# Patient Record
Sex: FEMALE | Race: BLACK OR AFRICAN AMERICAN | NOT HISPANIC OR LATINO | Employment: OTHER | URBAN - NONMETROPOLITAN AREA
[De-identification: names, ages, dates, MRNs, and addresses within clinical notes are randomized per-mention and may not be internally consistent; named-entity substitution may affect disease eponyms.]

---
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
## 2019-01-03 PROBLEM — Z97.5 NEXPLANON IN PLACE: Status: ACTIVE | Noted: 2019-01-03

## 2019-01-03 PROBLEM — R10.2 ACUTE PELVIC PAIN, FEMALE: Status: RESOLVED | Noted: 2018-09-06 | Resolved: 2019-01-03

## 2019-01-03 PROBLEM — N20.0 NEPHROLITHIASIS: Status: ACTIVE | Noted: 2019-01-03

## 2019-01-07 ENCOUNTER — TELEPHONE (OUTPATIENT)
Dept: PSYCHIATRY | Facility: CLINIC | Age: 19
End: 2019-01-07

## 2019-01-08 RX ORDER — QUETIAPINE FUMARATE 25 MG/1
50 TABLET, FILM COATED ORAL NIGHTLY
Qty: 60 TABLET | Refills: 2 | Status: SHIPPED | OUTPATIENT
Start: 2019-01-08 | End: 2019-02-22 | Stop reason: SDUPTHER

## 2019-02-22 ENCOUNTER — OFFICE VISIT (OUTPATIENT)
Dept: PSYCHIATRY | Facility: CLINIC | Age: 19
End: 2019-02-22

## 2019-02-22 VITALS — WEIGHT: 188 LBS | BODY MASS INDEX: 29.51 KG/M2 | HEIGHT: 67 IN

## 2019-02-22 DIAGNOSIS — F84.0 AUTISM SPECTRUM DISORDER: ICD-10-CM

## 2019-02-22 DIAGNOSIS — F79 INTELLECTUAL DISABILITY: Primary | ICD-10-CM

## 2019-02-22 DIAGNOSIS — F80.9 PROBLEMS WITH COMMUNICATION: ICD-10-CM

## 2019-02-22 DIAGNOSIS — F51.05 INSOMNIA DUE TO OTHER MENTAL DISORDER: ICD-10-CM

## 2019-02-22 DIAGNOSIS — R32 ENURESIS: ICD-10-CM

## 2019-02-22 DIAGNOSIS — R15.9 ENCOPRESIS: ICD-10-CM

## 2019-02-22 DIAGNOSIS — F99 INSOMNIA DUE TO OTHER MENTAL DISORDER: ICD-10-CM

## 2019-02-22 DIAGNOSIS — F84.0 AUTISM SPECTRUM DISORDER REQUIRING VERY SUBSTANTIAL SUPPORT (LEVEL 3): ICD-10-CM

## 2019-02-22 PROCEDURE — 99214 OFFICE O/P EST MOD 30 MIN: CPT | Performed by: PSYCHIATRY & NEUROLOGY

## 2019-02-22 RX ORDER — CLONIDINE HYDROCHLORIDE 0.3 MG/1
0.3 TABLET ORAL NIGHTLY
Qty: 30 TABLET | Refills: 0 | Status: SHIPPED | OUTPATIENT
Start: 2019-02-22 | End: 2019-03-26 | Stop reason: SDUPTHER

## 2019-02-22 RX ORDER — QUETIAPINE FUMARATE 50 MG/1
50 TABLET, FILM COATED ORAL NIGHTLY
Qty: 30 TABLET | Refills: 2 | Status: SHIPPED | OUTPATIENT
Start: 2019-02-22 | End: 2020-02-28 | Stop reason: SDDI

## 2019-02-22 RX ORDER — QUETIAPINE FUMARATE 25 MG/1
50 TABLET, FILM COATED ORAL NIGHTLY
Qty: 60 TABLET | Refills: 2 | Status: SHIPPED | OUTPATIENT
Start: 2019-02-22 | End: 2019-02-22

## 2019-02-22 NOTE — PROGRESS NOTES
Patient ID: Eboni Hernandez is a 18 y.o. female is here today for follow-up.      Patient has no known allergies.     History of Present Illness     Eboni is a 17 yo female with profound intellectual disability, history of autism spectrum disorder  and epilepsy who presents to the clinic with adopted mother (maternal aunt adopted her as an infant) for a follow up for medication management for physical aggression, irritability and insomnia.    Mother reports compliance w/ Clonidine 0.2mg at night, Risperdal 0.5mg bid and Seroquel 50mg at night. Sleep is reported to be improved and sleeps well 4/7 nights. Has difficulty falling and staying asleep other nights. Has periods of defiance and aggression but improved and episodes don't last over 10-15 minutes.      Mood is good w/ good appetite.  Eboni has been treated for medical issues - multiple kidney stones and ovarian cyst. She had surgery for management of kidney stones. Medically stable at this time. She had two more episodes of Kidney stones. Her IUD was also removed and replaced with implant in her arm. Due to lack of meaningful verbal communication patient expresses self by loud shouting and grunting.       The following portions of the patient's history were reviewed and updated as appropriate: past family history, past surgical history and problem list.     PFSH:     Review of Systems   Could not be obtained from the patient. Mother denies any concerns      Objective      Mental Status Exam  Appearance:Neatly, casually dressed, good hygeine.   Cooperation: non-cooperative, apathetic, does not engage  Orientation: unable to assess  Gait and station: unstable but walks independently  Psychomotor: No psychomotor agitation/retardation, No EPS, No motor tics  Speech-  lack of meaningful speech, grunts and makes noises  Mood: euthymic  Affect- mood congruent  Thought Content-unable to assess  Thought process-unable to assess  Suicidality:unable to  assess  Homicidality: unable to assess  Memory, Concentration, Impulse control, insight, Judgement: unable to assess        MEDICATION ISSUES:         Current Outpatient Prescriptions on File Prior to Visit   Medication Sig Dispense Refill   • CloNIDine (CATAPRES) 0.2 MG tablet Take 1/2 tablet during daytime and 1 tablet at night 45 tablet 2   • ibuprofen (ADVIL,MOTRIN) 600 MG tablet Take 1 tablet by mouth Every 6 (Six) Hours As Needed for Mild Pain . 30 tablet 0   • levETIRAcetam (KEPPRA) 750 MG tablet Take 1,000 mg by mouth 2 (Two) Times a Day.       • norethindrone (AYGESTIN) 5 MG tablet Take 5 mg by mouth Daily.       • risperiDONE (risperDAL) 0.5 MG tablet Take 1/2 tab in the morning and at night 15 tablet 2   • [DISCONTINUED] CloNIDine (CATAPRES) 0.2 MG tablet Take 1/2 tablet during daytime and 1 tablet at night 45 tablet 2   • [DISCONTINUED] risperiDONE (risperDAL) 0.5 MG tablet Take 1/2 tab in the morning and at night 15 tablet 2      No current facility-administered medications on file prior to visit.          Lab Review:   not applicable     Assessment/Plan      Eboni is a 17 yo female with profound intellectual disability, history of autism spectrum disorder  and epilepsy who lives with adopted mother (maternal aunt adopted her as an infant). She lives in [place] with Carlos, her uncle (carlos's ), her biological brother 16 and biological sister 18. Eboni has history of global developmental delay with deficits in intellectual and adaptive functioning. She lacks ability to communicate verbally, lacks control of her bowel function.  She has an IEP in place and also has Rajwinder Devi including a  Valery Mantilla. She attends [employer] where she can go until the age of 20.     Eboni is managed in clinic for aggression, self injurious behaviors, insomnia. For the seizure disorder/grand mal seizures, started 2 years ago and takes Keppra 1000 mg twice daily.  She is  followed by  neurology. She receives in house therapy. Mother contacted her  to inquire about respite services - however such services are not available at this time due to budget deficit, as per the .      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, controlled (CMS/[facility])  New diagnosis of Right Ovarian Cyst and Kidney stones      Other orders  -  increase CloNIDine (CATAPRES) to 0.3 mg for insomnia; will consider trazodone or remeron for insomnia in future  -  risperiDONE (risperDAL) 0.5 MG tablet morning and night for aggression, self injury and irritability  - continue Seroquel 50mg po nightly  -  Stopped vistaril 25mg po for lack of therapeutic effect;   - discussed behavior modification of sitting on the toilet at fixed times on a daily basis, followed by a reward (granola bar)  - discussed obtaining labwork/metabolic profile. However patient is non-compliant with any needle sticks. Will coordinate it with any procedure such as dental work when she will be sedated.  - Provided a letter for school for accommodations (for her new onset of aggressive behaviors) that would allow Eboni to continue attending school.   - We discussed risks, benefits, and side effects of the above medication and the motherwas agreeable with the plan. Mother is aware of the risks associated including akathesia, dystonia, tardive dyskinesia, prolactin changes and cardiac conduction complications. She provides verbal cosnent.     F/up in 6-8 weeks     The patient was instructed to call clinic as needed or go to ER if in crisis.      Susanna Hernandez MD

## 2019-03-26 RX ORDER — RISPERIDONE 0.5 MG/1
0.5 TABLET ORAL 2 TIMES DAILY
Qty: 30 TABLET | Refills: 3 | Status: SHIPPED | OUTPATIENT
Start: 2019-03-26 | End: 2019-04-30

## 2019-03-26 RX ORDER — CLONIDINE HYDROCHLORIDE 0.3 MG/1
0.3 TABLET ORAL NIGHTLY
Qty: 30 TABLET | Refills: 0 | Status: SHIPPED | OUTPATIENT
Start: 2019-03-26 | End: 2019-10-25 | Stop reason: SDUPTHER

## 2019-04-30 ENCOUNTER — TELEPHONE (OUTPATIENT)
Dept: PSYCHIATRY | Facility: CLINIC | Age: 19
End: 2019-04-30

## 2019-04-30 RX ORDER — RISPERIDONE 1 MG/1
1 TABLET ORAL DAILY
Qty: 60 TABLET | Refills: 3 | Status: SHIPPED | OUTPATIENT
Start: 2019-04-30 | End: 2019-10-25

## 2019-04-30 NOTE — TELEPHONE ENCOUNTER
New script sent in to pharmacy for 1mg of Risperdal bid, per provider. Called and spoke with guardian to let her know as well. Complete

## 2019-09-19 ENCOUNTER — APPOINTMENT (OUTPATIENT)
Dept: GENERAL RADIOLOGY | Facility: HOSPITAL | Age: 19
End: 2019-09-19

## 2019-09-19 ENCOUNTER — HOSPITAL ENCOUNTER (EMERGENCY)
Facility: HOSPITAL | Age: 19
Discharge: HOME | End: 2019-09-19
Attending: EMERGENCY MEDICINE

## 2019-09-19 VITALS
RESPIRATION RATE: 17 BRPM | OXYGEN SATURATION: 98 % | WEIGHT: 204 LBS | HEART RATE: 95 BPM | BODY MASS INDEX: 30.92 KG/M2 | SYSTOLIC BLOOD PRESSURE: 117 MMHG | HEIGHT: 68 IN | TEMPERATURE: 97.7 F | DIASTOLIC BLOOD PRESSURE: 75 MMHG

## 2019-09-19 DIAGNOSIS — S99.912A INJURY OF LEFT ANKLE, INITIAL ENCOUNTER: Primary | ICD-10-CM

## 2019-09-19 PROCEDURE — 99283 EMERGENCY DEPT VISIT LOW MDM: CPT

## 2019-09-19 PROCEDURE — 73610 X-RAY EXAM OF ANKLE: CPT

## 2019-09-19 NOTE — ED PROVIDER NOTES
Subjective   This patient has a history of autism and is nonverbal.  Family member states that the patient inverted her left ankle walking down the stairs yesterday.  She is able to bear weight but walks with a limp.  No obvious deformity.  No appreciated pain with palpation of the left knee or proximal fibula.            Review of Systems   Musculoskeletal:        Left ankle pain   All other systems reviewed and are negative.      Past Medical History:   Diagnosis Date   • Autism    • Chronic pelvic pain in female    • Communication deficit    • Encopresis    • Enuresis    • Global developmental delay    • History of kidney stones     6 WEEKS AGO   • Multiple developmental ovarian cysts    • Scoliosis    • Seizures (CMS/HCC) 2018    LAST SEIZURE WAS ONE WEEK AGO-PETITE MAL.  LAST GRAND MAL SEIZURE WAS 4-5 MONTHS AGO       No Known Allergies    Past Surgical History:   Procedure Laterality Date   • DIAGNOSTIC LAPAROSCOPY N/A 9/11/2018    Procedure: DIAGNOSTIC LAPAROSCOPY drainage of right ovarian cyst;  Surgeon: Amol Ospina MD;  Location: [facility];  Service: Obstetrics/Gynecology   • OTHER SURGICAL HISTORY      IUD PLACEMENT IN OR   • URETEROSCOPY LASER LITHOTRIPSY WITH STENT INSERTION  2018       Family History   Problem Relation Age of Onset   • Drug abuse Mother    • Anxiety disorder Mother    • Depression Mother    • Drug abuse Father        Social History     Socioeconomic History   • Marital status: Single     Spouse name: Not on file   • Number of children: Not on file   • Years of education: Not on file   • Highest education level: Not on file   Tobacco Use   • Smoking status: Never Smoker   • Smokeless tobacco: Never Used   Substance and Sexual Activity   • Alcohol use: No   • Drug use: No   • Sexual activity: Defer           Objective   Physical Exam   Constitutional: She appears well-developed and well-nourished. No distress.   HENT:   Head: Normocephalic and atraumatic.   Neck: Normal range of  motion.   Cardiovascular: Normal rate and regular rhythm.   Pulmonary/Chest: Effort normal.   Musculoskeletal:   Pain with palpation of the left ankle.  No deformity, swelling, ecchymosis or any other abnormality apparent on exam.  No pain with palpation of the left knee or proximal fibula.  2+ dorsalis pedis pulse on the left   Neurological: She is alert.   Skin: Skin is warm and dry. She is not diaphoretic.   Psychiatric: She has a normal mood and affect. Her behavior is normal. Thought content normal.       Procedures           ED Course      Patient unable to use crutches.  Will place in boot and have follow-up with Ortho.            MDM    Final diagnoses:   Injury of left ankle, initial encounter              Pilo Yusuf PA-C  09/19/19 1144

## 2019-09-20 ENCOUNTER — TRANSCRIBE ORDERS (OUTPATIENT)
Dept: ADMINISTRATIVE | Facility: HOSPITAL | Age: 19
End: 2019-09-20

## 2019-09-20 DIAGNOSIS — M25.572 LEFT ANKLE PAIN, UNSPECIFIED CHRONICITY: Primary | ICD-10-CM

## 2019-09-26 ENCOUNTER — HOSPITAL ENCOUNTER (OUTPATIENT)
Dept: MRI IMAGING | Facility: HOSPITAL | Age: 19
Discharge: HOME | End: 2019-09-26
Admitting: ORTHOPAEDIC SURGERY

## 2019-09-26 DIAGNOSIS — M25.572 LEFT ANKLE PAIN, UNSPECIFIED CHRONICITY: ICD-10-CM

## 2019-09-26 PROCEDURE — 73721 MRI JNT OF LWR EXTRE W/O DYE: CPT

## 2019-10-08 ENCOUNTER — TELEPHONE (OUTPATIENT)
Dept: PSYCHIATRY | Facility: CLINIC | Age: 19
End: 2019-10-08

## 2019-10-08 RX ORDER — MIRTAZAPINE 15 MG/1
7.5 TABLET, FILM COATED ORAL NIGHTLY
Qty: 15 TABLET | Refills: 1 | Status: SHIPPED | OUTPATIENT
Start: 2019-10-08 | End: 2019-10-25 | Stop reason: SDUPTHER

## 2019-10-08 NOTE — TELEPHONE ENCOUNTER
Sent in Remeron 7.5mg PO QHS for sleep and mood. Dr. Hernandez had mentioned maybe starting this in previous note. I sent it to the pharmacy on file, [facility].

## 2019-10-10 ENCOUNTER — APPOINTMENT (OUTPATIENT)
Dept: CT IMAGING | Facility: HOSPITAL | Age: 19
End: 2019-10-10

## 2019-10-10 ENCOUNTER — HOSPITAL ENCOUNTER (EMERGENCY)
Facility: HOSPITAL | Age: 19
Discharge: HOME | End: 2019-10-10
Attending: EMERGENCY MEDICINE | Admitting: EMERGENCY MEDICINE

## 2019-10-10 VITALS
OXYGEN SATURATION: 96 % | TEMPERATURE: 98.6 F | DIASTOLIC BLOOD PRESSURE: 72 MMHG | BODY MASS INDEX: 30.83 KG/M2 | SYSTOLIC BLOOD PRESSURE: 136 MMHG | RESPIRATION RATE: 18 BRPM | WEIGHT: 203.4 LBS | HEIGHT: 68 IN | HEART RATE: 95 BPM

## 2019-10-10 DIAGNOSIS — R10.9 ABDOMINAL PAIN, UNSPECIFIED ABDOMINAL LOCATION: Primary | ICD-10-CM

## 2019-10-10 LAB
ALBUMIN SERPL-MCNC: 4.3 G/DL (ref 3.5–5.2)
ALBUMIN/GLOB SERPL: 1.3 G/DL
ALP SERPL-CCNC: 79 U/L (ref 39–117)
ALT SERPL W P-5'-P-CCNC: 12 U/L (ref 1–33)
AMORPH URATE CRY URNS QL MICRO: ABNORMAL /HPF
ANION GAP SERPL CALCULATED.3IONS-SCNC: 10.5 MMOL/L (ref 5–15)
AST SERPL-CCNC: 14 U/L (ref 1–32)
B-HCG UR QL: NEGATIVE
BACTERIA UR QL AUTO: ABNORMAL /HPF
BASOPHILS # BLD AUTO: 0.05 10*3/MM3 (ref 0–0.2)
BASOPHILS NFR BLD AUTO: 0.7 % (ref 0–1.5)
BILIRUB SERPL-MCNC: 0.5 MG/DL (ref 0.2–1.2)
BILIRUB UR QL STRIP: NEGATIVE
BUN BLD-MCNC: 13 MG/DL (ref 6–20)
BUN/CREAT SERPL: 16 (ref 7–25)
CALCIUM SPEC-SCNC: 9.4 MG/DL (ref 8.6–10.5)
CHLORIDE SERPL-SCNC: 108 MMOL/L (ref 98–107)
CLARITY UR: CLEAR
CO2 SERPL-SCNC: 24.5 MMOL/L (ref 22–29)
COLOR UR: YELLOW
CREAT BLD-MCNC: 0.81 MG/DL (ref 0.57–1)
DEPRECATED RDW RBC AUTO: 40 FL (ref 37–54)
EOSINOPHIL # BLD AUTO: 0.06 10*3/MM3 (ref 0–0.4)
EOSINOPHIL NFR BLD AUTO: 0.9 % (ref 0.3–6.2)
ERYTHROCYTE [DISTWIDTH] IN BLOOD BY AUTOMATED COUNT: 12.7 % (ref 12.3–15.4)
GFR SERPL CREATININE-BSD FRML MDRD: 109 ML/MIN/1.73
GLOBULIN UR ELPH-MCNC: 3.4 GM/DL
GLUCOSE BLD-MCNC: 93 MG/DL (ref 65–99)
GLUCOSE UR STRIP-MCNC: NEGATIVE MG/DL
HCT VFR BLD AUTO: 41 % (ref 34–46.6)
HGB BLD-MCNC: 13 G/DL (ref 12–15.9)
HGB UR QL STRIP.AUTO: ABNORMAL
HYALINE CASTS UR QL AUTO: ABNORMAL /LPF
IMM GRANULOCYTES # BLD AUTO: 0.01 10*3/MM3 (ref 0–0.05)
IMM GRANULOCYTES NFR BLD AUTO: 0.1 % (ref 0–0.5)
KETONES UR QL STRIP: ABNORMAL
LEUKOCYTE ESTERASE UR QL STRIP.AUTO: NEGATIVE
LYMPHOCYTES # BLD AUTO: 2.19 10*3/MM3 (ref 0.7–3.1)
LYMPHOCYTES NFR BLD AUTO: 32.3 % (ref 19.6–45.3)
MCH RBC QN AUTO: 27.3 PG (ref 26.6–33)
MCHC RBC AUTO-ENTMCNC: 31.7 G/DL (ref 31.5–35.7)
MCV RBC AUTO: 86 FL (ref 79–97)
MONOCYTES # BLD AUTO: 0.63 10*3/MM3 (ref 0.1–0.9)
MONOCYTES NFR BLD AUTO: 9.3 % (ref 5–12)
MUCOUS THREADS URNS QL MICRO: ABNORMAL /HPF
NEUTROPHILS # BLD AUTO: 3.83 10*3/MM3 (ref 1.7–7)
NEUTROPHILS NFR BLD AUTO: 56.7 % (ref 42.7–76)
NITRITE UR QL STRIP: NEGATIVE
NRBC BLD AUTO-RTO: 0 /100 WBC (ref 0–0.2)
PH UR STRIP.AUTO: 5.5 [PH] (ref 5–8)
PLATELET # BLD AUTO: 271 10*3/MM3 (ref 140–450)
PMV BLD AUTO: 11 FL (ref 6–12)
POTASSIUM BLD-SCNC: 4.2 MMOL/L (ref 3.5–5.2)
PROT SERPL-MCNC: 7.7 G/DL (ref 6–8.5)
PROT UR QL STRIP: NEGATIVE
RBC # BLD AUTO: 4.77 10*6/MM3 (ref 3.77–5.28)
RBC # UR: ABNORMAL /HPF
REF LAB TEST METHOD: ABNORMAL
SODIUM BLD-SCNC: 143 MMOL/L (ref 136–145)
SP GR UR STRIP: >=1.03 (ref 1–1.03)
SQUAMOUS #/AREA URNS HPF: ABNORMAL /HPF
UROBILINOGEN UR QL STRIP: ABNORMAL
WBC NRBC COR # BLD: 6.77 10*3/MM3 (ref 3.4–10.8)
WBC UR QL AUTO: ABNORMAL /HPF

## 2019-10-10 PROCEDURE — 81025 URINE PREGNANCY TEST: CPT | Performed by: PHYSICIAN ASSISTANT

## 2019-10-10 PROCEDURE — 81001 URINALYSIS AUTO W/SCOPE: CPT | Performed by: PHYSICIAN ASSISTANT

## 2019-10-10 PROCEDURE — 85025 COMPLETE CBC W/AUTO DIFF WBC: CPT | Performed by: PHYSICIAN ASSISTANT

## 2019-10-10 PROCEDURE — 25010000002 IOPAMIDOL 61 % SOLUTION: Performed by: EMERGENCY MEDICINE

## 2019-10-10 PROCEDURE — 74177 CT ABD & PELVIS W/CONTRAST: CPT

## 2019-10-10 PROCEDURE — 80053 COMPREHEN METABOLIC PANEL: CPT | Performed by: PHYSICIAN ASSISTANT

## 2019-10-10 PROCEDURE — 99283 EMERGENCY DEPT VISIT LOW MDM: CPT

## 2019-10-10 PROCEDURE — 96361 HYDRATE IV INFUSION ADD-ON: CPT

## 2019-10-10 PROCEDURE — 25010000002 LORAZEPAM PER 2 MG: Performed by: EMERGENCY MEDICINE

## 2019-10-10 PROCEDURE — 96374 THER/PROPH/DIAG INJ IV PUSH: CPT

## 2019-10-10 RX ORDER — LORAZEPAM 2 MG/ML
1 INJECTION INTRAMUSCULAR ONCE
Status: COMPLETED | OUTPATIENT
Start: 2019-10-10 | End: 2019-10-10

## 2019-10-10 RX ADMIN — SODIUM CHLORIDE 1000 ML: 9 INJECTION, SOLUTION INTRAVENOUS at 10:52

## 2019-10-10 RX ADMIN — LORAZEPAM 1 MG: 2 INJECTION INTRAMUSCULAR; INTRAVENOUS at 12:06

## 2019-10-10 RX ADMIN — IOPAMIDOL 100 ML: 612 INJECTION, SOLUTION INTRAVENOUS at 12:30

## 2019-10-12 ENCOUNTER — APPOINTMENT (OUTPATIENT)
Dept: CT IMAGING | Facility: HOSPITAL | Age: 19
End: 2019-10-12

## 2019-10-12 ENCOUNTER — HOSPITAL ENCOUNTER (EMERGENCY)
Facility: HOSPITAL | Age: 19
Discharge: HOME | End: 2019-10-12
Attending: STUDENT IN AN ORGANIZED HEALTH CARE EDUCATION/TRAINING PROGRAM | Admitting: STUDENT IN AN ORGANIZED HEALTH CARE EDUCATION/TRAINING PROGRAM

## 2019-10-12 VITALS
HEIGHT: 68 IN | WEIGHT: 204 LBS | RESPIRATION RATE: 20 BRPM | TEMPERATURE: 98.1 F | BODY MASS INDEX: 30.92 KG/M2 | OXYGEN SATURATION: 97 %

## 2019-10-12 DIAGNOSIS — M25.572 ACUTE LEFT ANKLE PAIN: Primary | ICD-10-CM

## 2019-10-12 LAB
ALBUMIN SERPL-MCNC: 3.8 G/DL (ref 3.5–5.2)
ALBUMIN/GLOB SERPL: 1.2 G/DL
ALP SERPL-CCNC: 75 U/L (ref 39–117)
ALT SERPL W P-5'-P-CCNC: 12 U/L (ref 1–33)
AMORPH URATE CRY URNS QL MICRO: ABNORMAL /HPF
ANION GAP SERPL CALCULATED.3IONS-SCNC: 12.4 MMOL/L (ref 5–15)
AST SERPL-CCNC: 17 U/L (ref 1–32)
BACTERIA UR QL AUTO: ABNORMAL /HPF
BASOPHILS # BLD AUTO: 0.05 10*3/MM3 (ref 0–0.2)
BASOPHILS NFR BLD AUTO: 1 % (ref 0–1.5)
BILIRUB SERPL-MCNC: 0.6 MG/DL (ref 0.2–1.2)
BILIRUB UR QL STRIP: NEGATIVE
BUN BLD-MCNC: 11 MG/DL (ref 6–20)
BUN/CREAT SERPL: 16.4 (ref 7–25)
CALCIUM SPEC-SCNC: 9.1 MG/DL (ref 8.6–10.5)
CHLORIDE SERPL-SCNC: 109 MMOL/L (ref 98–107)
CLARITY UR: CLEAR
CO2 SERPL-SCNC: 20.6 MMOL/L (ref 22–29)
COLOR UR: YELLOW
CREAT BLD-MCNC: 0.67 MG/DL (ref 0.57–1)
DEPRECATED RDW RBC AUTO: 38.8 FL (ref 37–54)
EOSINOPHIL # BLD AUTO: 0.11 10*3/MM3 (ref 0–0.4)
EOSINOPHIL NFR BLD AUTO: 2.1 % (ref 0.3–6.2)
ERYTHROCYTE [DISTWIDTH] IN BLOOD BY AUTOMATED COUNT: 12.5 % (ref 12.3–15.4)
GFR SERPL CREATININE-BSD FRML MDRD: 136 ML/MIN/1.73
GLOBULIN UR ELPH-MCNC: 3.3 GM/DL
GLUCOSE BLD-MCNC: 86 MG/DL (ref 65–99)
GLUCOSE UR STRIP-MCNC: NEGATIVE MG/DL
HCT VFR BLD AUTO: 38.8 % (ref 34–46.6)
HGB BLD-MCNC: 12.6 G/DL (ref 12–15.9)
HGB UR QL STRIP.AUTO: ABNORMAL
HYALINE CASTS UR QL AUTO: ABNORMAL /LPF
IMM GRANULOCYTES # BLD AUTO: 0.01 10*3/MM3 (ref 0–0.05)
IMM GRANULOCYTES NFR BLD AUTO: 0.2 % (ref 0–0.5)
KETONES UR QL STRIP: NEGATIVE
LEUKOCYTE ESTERASE UR QL STRIP.AUTO: NEGATIVE
LYMPHOCYTES # BLD AUTO: 1.46 10*3/MM3 (ref 0.7–3.1)
LYMPHOCYTES NFR BLD AUTO: 28.1 % (ref 19.6–45.3)
MCH RBC QN AUTO: 27.6 PG (ref 26.6–33)
MCHC RBC AUTO-ENTMCNC: 32.5 G/DL (ref 31.5–35.7)
MCV RBC AUTO: 85.1 FL (ref 79–97)
MONOCYTES # BLD AUTO: 0.48 10*3/MM3 (ref 0.1–0.9)
MONOCYTES NFR BLD AUTO: 9.2 % (ref 5–12)
MUCOUS THREADS URNS QL MICRO: ABNORMAL /HPF
NEUTROPHILS # BLD AUTO: 3.08 10*3/MM3 (ref 1.7–7)
NEUTROPHILS NFR BLD AUTO: 59.4 % (ref 42.7–76)
NITRITE UR QL STRIP: NEGATIVE
NRBC BLD AUTO-RTO: 0 /100 WBC (ref 0–0.2)
PH UR STRIP.AUTO: 7 [PH] (ref 5–8)
PLATELET # BLD AUTO: 257 10*3/MM3 (ref 140–450)
PMV BLD AUTO: 10.7 FL (ref 6–12)
POTASSIUM BLD-SCNC: 3.7 MMOL/L (ref 3.5–5.2)
PROT SERPL-MCNC: 7.1 G/DL (ref 6–8.5)
PROT UR QL STRIP: NEGATIVE
RBC # BLD AUTO: 4.56 10*6/MM3 (ref 3.77–5.28)
RBC # UR: ABNORMAL /HPF
REF LAB TEST METHOD: ABNORMAL
SODIUM BLD-SCNC: 142 MMOL/L (ref 136–145)
SP GR UR STRIP: 1.01 (ref 1–1.03)
SQUAMOUS #/AREA URNS HPF: ABNORMAL /HPF
UROBILINOGEN UR QL STRIP: ABNORMAL
WBC NRBC COR # BLD: 5.19 10*3/MM3 (ref 3.4–10.8)
WBC UR QL AUTO: ABNORMAL /HPF

## 2019-10-12 PROCEDURE — 25010000002 HALOPERIDOL LACTATE PER 5 MG: Performed by: STUDENT IN AN ORGANIZED HEALTH CARE EDUCATION/TRAINING PROGRAM

## 2019-10-12 PROCEDURE — 81001 URINALYSIS AUTO W/SCOPE: CPT | Performed by: STUDENT IN AN ORGANIZED HEALTH CARE EDUCATION/TRAINING PROGRAM

## 2019-10-12 PROCEDURE — 85025 COMPLETE CBC W/AUTO DIFF WBC: CPT | Performed by: STUDENT IN AN ORGANIZED HEALTH CARE EDUCATION/TRAINING PROGRAM

## 2019-10-12 PROCEDURE — 74176 CT ABD & PELVIS W/O CONTRAST: CPT

## 2019-10-12 PROCEDURE — 80053 COMPREHEN METABOLIC PANEL: CPT | Performed by: STUDENT IN AN ORGANIZED HEALTH CARE EDUCATION/TRAINING PROGRAM

## 2019-10-12 PROCEDURE — 25010000002 LORAZEPAM PER 2 MG: Performed by: STUDENT IN AN ORGANIZED HEALTH CARE EDUCATION/TRAINING PROGRAM

## 2019-10-12 PROCEDURE — 96372 THER/PROPH/DIAG INJ SC/IM: CPT

## 2019-10-12 PROCEDURE — 99283 EMERGENCY DEPT VISIT LOW MDM: CPT

## 2019-10-12 RX ORDER — LORAZEPAM 2 MG/ML
2 INJECTION INTRAMUSCULAR ONCE
Status: COMPLETED | OUTPATIENT
Start: 2019-10-12 | End: 2019-10-12

## 2019-10-12 RX ORDER — HYDROCODONE BITARTRATE AND ACETAMINOPHEN 5; 325 MG/1; MG/1
1 TABLET ORAL EVERY 6 HOURS PRN
Qty: 20 TABLET | Refills: 0 | Status: SHIPPED | OUTPATIENT
Start: 2019-10-12 | End: 2020-02-28

## 2019-10-12 RX ORDER — HALOPERIDOL 5 MG/ML
5 INJECTION INTRAMUSCULAR ONCE
Status: COMPLETED | OUTPATIENT
Start: 2019-10-12 | End: 2019-10-12

## 2019-10-12 RX ORDER — DIAZEPAM 5 MG/1
10 TABLET ORAL ONCE
Status: COMPLETED | OUTPATIENT
Start: 2019-10-12 | End: 2019-10-12

## 2019-10-12 RX ADMIN — DIAZEPAM 10 MG: 5 TABLET ORAL at 08:32

## 2019-10-12 RX ADMIN — HALOPERIDOL LACTATE 5 MG: 5 INJECTION, SOLUTION INTRAMUSCULAR at 09:13

## 2019-10-12 RX ADMIN — LORAZEPAM 2 MG: 2 INJECTION, SOLUTION INTRAMUSCULAR; INTRAVENOUS at 09:12

## 2019-10-12 NOTE — ED PROVIDER NOTES
Subjective   19-year-old female who presents to the emergency department with concerns for abdominal pain.  Mother states that she was seen here 2 days ago and they could not determine what was causing the patient's pain.  Mother states the last time the patient acted this way she had a kidney stone.  Patient has autism and aside from verbal groans and noises does not speak.  Mother states she still has been eating and drinking as normal.  Says her pain was so severe before she brought her and that the patient was biting on a pillow.  Patient can give no history or describe what she is experiencing.  Mother reports that normally they give her Valium before doctor's visits and that this will make things a lot easier on us if we give this to her.            Review of Systems   All other systems reviewed and are negative.      Past Medical History:   Diagnosis Date   • Autism    • Chronic pelvic pain in female    • Communication deficit    • Encopresis    • Enuresis    • Global developmental delay    • History of kidney stones     6 WEEKS AGO   • Multiple developmental ovarian cysts    • Scoliosis    • Seizures (CMS/HCC) 2018    LAST SEIZURE WAS ONE WEEK AGO-PETITE MAL.  LAST GRAND MAL SEIZURE WAS 4-5 MONTHS AGO       No Known Allergies    Past Surgical History:   Procedure Laterality Date   • DIAGNOSTIC LAPAROSCOPY N/A 9/11/2018    Procedure: DIAGNOSTIC LAPAROSCOPY drainage of right ovarian cyst;  Surgeon: Amol Ospina MD;  Location: [facility];  Service: Obstetrics/Gynecology   • OTHER SURGICAL HISTORY      IUD PLACEMENT IN OR   • URETEROSCOPY LASER LITHOTRIPSY WITH STENT INSERTION  2018       Family History   Problem Relation Age of Onset   • Drug abuse Mother    • Anxiety disorder Mother    • Depression Mother    • Drug abuse Father        Social History     Socioeconomic History   • Marital status: Single     Spouse name: Not on file   • Number of children: Not on file   • Years of education: Not on file    • Highest education level: Not on file   Tobacco Use   • Smoking status: Never Smoker   • Smokeless tobacco: Never Used   Substance and Sexual Activity   • Alcohol use: No   • Drug use: No   • Sexual activity: Defer           Objective   Physical Exam   Nursing note and vitals reviewed.      GEN: Periodically moaning during the encounter appears frightened, but in no life-threatening distress   head: Normocephalic, atraumatic  Eyes: Pupils equal round reactive to light  ENT: Posterior pharynx normal in appearance, oral mucosa is moist  Chest: Nontender to palpation  Cardiovascular: Regular rate  Lungs: Clear to auscultation bilaterally  Abdomen: Soft, nontender, nondistended, no peritoneal signs  Extremities: Patient has orthopedic boot on the left lower extremity  Neuro: Moves all 4 extremities in a coordinated manner  Psych: Nonverbal, appears frightened        Procedures           ED Course  ED Course as of Oct 12 1147   Sat Oct 12, 2019   1145 CT scan showed no acute pathology.  Laboratories have no significant abnormalities.  Reviewing the patient's chart she currently has an orthopedic issue with her left ankle that she is following with Dr. Spencer for and he is prescribed Tylenol 3.  I did sit down with the family and discussed the possibility of this being the cause of her pain.  They state the Tylenol 3 has not helped her at all.  We did come to an agreement to try a stronger narcotic pain medication for them to follow-up as planned.  [DT]      ED Course User Index  [DT] Ronen Valdes MD                  MDM  Number of Diagnoses or Management Options  Acute left ankle pain:   Diagnosis management comments: Patient was treated with oral Valium and then after approximately an hour when this was not enough to complete imaging she was given intramuscular Haldol as well as Ativan.       Amount and/or Complexity of Data Reviewed  Clinical lab tests: ordered  Tests in the radiology section of CPT®:  reviewed  Discussion of test results with the performing providers: yes  Decide to obtain previous medical records or to obtain history from someone other than the patient: yes  Obtain history from someone other than the patient: yes  Review and summarize past medical records: yes  Independent visualization of images, tracings, or specimens: yes        Final diagnoses:   Acute left ankle pain              Ronen Valdes MD  10/12/19 1144

## 2019-10-25 ENCOUNTER — OFFICE VISIT (OUTPATIENT)
Dept: PSYCHIATRY | Facility: CLINIC | Age: 19
End: 2019-10-25

## 2019-10-25 VITALS — WEIGHT: 200 LBS | BODY MASS INDEX: 30.31 KG/M2 | HEIGHT: 68 IN

## 2019-10-25 DIAGNOSIS — F79 INTELLECTUAL DISABILITY: Primary | ICD-10-CM

## 2019-10-25 DIAGNOSIS — F99 INSOMNIA DUE TO OTHER MENTAL DISORDER: ICD-10-CM

## 2019-10-25 DIAGNOSIS — F80.9 PROBLEMS WITH COMMUNICATION: ICD-10-CM

## 2019-10-25 DIAGNOSIS — F51.05 INSOMNIA DUE TO OTHER MENTAL DISORDER: ICD-10-CM

## 2019-10-25 DIAGNOSIS — R32 ENURESIS: ICD-10-CM

## 2019-10-25 DIAGNOSIS — R15.9 ENCOPRESIS: ICD-10-CM

## 2019-10-25 DIAGNOSIS — F84.0 AUTISM SPECTRUM DISORDER: ICD-10-CM

## 2019-10-25 PROCEDURE — 99214 OFFICE O/P EST MOD 30 MIN: CPT | Performed by: PSYCHIATRY & NEUROLOGY

## 2019-10-25 RX ORDER — MIRTAZAPINE 15 MG/1
7.5 TABLET, FILM COATED ORAL NIGHTLY
Qty: 15 TABLET | Refills: 1 | Status: SHIPPED | OUTPATIENT
Start: 2019-10-25 | End: 2020-02-28 | Stop reason: SDDI

## 2019-10-25 RX ORDER — CLONIDINE HYDROCHLORIDE 0.3 MG/1
0.3 TABLET ORAL NIGHTLY
Qty: 30 TABLET | Refills: 2 | Status: SHIPPED | OUTPATIENT
Start: 2019-10-25 | End: 2020-02-28 | Stop reason: SDUPTHER

## 2019-10-25 RX ORDER — RISPERIDONE 2 MG/1
2 TABLET ORAL 2 TIMES DAILY
Qty: 60 TABLET | Refills: 2 | Status: SHIPPED | OUTPATIENT
Start: 2019-10-25 | End: 2020-02-06 | Stop reason: SDUPTHER

## 2019-10-25 NOTE — PROGRESS NOTES
Patient ID: Eboni Heranndez is a 18 y.o. female is here today for follow-up.        This provider is located at [facility], [facility], [address] [place] [address], using [employer].  The patient is seen remotely at [facility], Behavioral health, [address], [address] [facility] in [facility] [place]note 2] via [employer], an encrypted service from one [facility] to another, with staff present. The patient's condition being diagnosed/treated is appropriate for telemedecine.  The provider identified himself as well as his credentials.      The patient and/or patient's guardian consent to be seen remotely, and when consent is given they understand that the consent allows for patient identifiable information to be sent to a third party as needed.  They may refuse to be seen remotely at any time.  The electronic data is encrypted and password protected, and the patient has been advised of the potential risks to privacy notwithstanding such measures.      Patient has no known allergies.     History of Present Illness     Eboni is a 20 yo -American female with profound intellectual disability, history of autism spectrum disorder  and epilepsy who presents to the clinic with adopted mother (maternal aunt adopted her as an infant) for a follow up for medication management for physical aggression, irritability and insomnia.  This is my first visit with the patient as previously she is seeing Dr. Hernandez.    Mother reports compliance w/ Clonidine 0.2mg at night and Risperdal 1mg bid.  Seroquel was stopped as patient's mother did not see benefit to the medication and felt that patient having a reaction which caused her to stay more awake during the night with this medication.  In between visits, mirtazapine 7.5 mg nightly was added for anxiety and sleep.  Her mother reports that this has worked intermittently for sleep and overall it has been improved.  She states that most recently, patient has  been screaming out.  She reports that she has done this in the past when she has had kidney stones or other medical issues that she is taking her for an extensive workup by multiple providers without any clear medical concerns or acute medical problems at this time.  Her urologist felt that she may be in pain or be anxious so he initiated diazepam opioid pain medications to see if this could improve patient's behavioral symptoms.  These have been somewhat beneficial.  Her mother is taking her for a dental appointment to assess whether or not there is any dental issues that would could be causing pain.  We will attempt to see if an increase in Risperdal can help with patient's symptoms.  As she is nonverbal it is difficult to assess the cause of these issues.  I discussed my concerns with patient's mother about increasing Risperdal dose and her weight as it is.  I advised to monitor for any increased weight gain as we may need to reevaluate our treatment plan should this occur.      The following portions of the patient's history were reviewed and updated as appropriate: past family history, past surgical history and problem list.     PFSH:     Review of Systems   Could not be obtained from the patient. Mother denies any concerns      Objective      Mental Status Exam  Appearance:Neatly, casually dressed, good hygeine.   Cooperation: non-cooperative, apathetic, does not engage  Orientation: unable to assess  Gait and station: unstable but walks independently  Psychomotor: No psychomotor agitation/retardation, No EPS, No motor tics  Speech-  lack of meaningful speech, grunts and makes noises  Mood: euthymic  Affect- mood congruent  Thought Content-unable to assess  Thought process-unable to assess  Suicidality:unable to assess  Homicidality: unable to assess  Memory, Concentration, Impulse control, insight, Judgement: unable to assess        MEDICATION ISSUES:         Current Outpatient Prescriptions on File Prior to  Visit   Medication Sig Dispense Refill   • CloNIDine (CATAPRES) 0.2 MG tablet Take 1/2 tablet during daytime and 1 tablet at night 45 tablet 2   • ibuprofen (ADVIL,MOTRIN) 600 MG tablet Take 1 tablet by mouth Every 6 (Six) Hours As Needed for Mild Pain . 30 tablet 0   • levETIRAcetam (KEPPRA) 750 MG tablet Take 1,000 mg by mouth 2 (Two) Times a Day.       • norethindrone (AYGESTIN) 5 MG tablet Take 5 mg by mouth Daily.       • risperiDONE (risperDAL) 0.5 MG tablet Take 1/2 tab in the morning and at night 15 tablet 2   • [DISCONTINUED] CloNIDine (CATAPRES) 0.2 MG tablet Take 1/2 tablet during daytime and 1 tablet at night 45 tablet 2   • [DISCONTINUED] risperiDONE (risperDAL) 0.5 MG tablet Take 1/2 tab in the morning and at night 15 tablet 2      No current facility-administered medications on file prior to visit.          Lab Review:   not applicable     Assessment/Plan      Eboni is a 19 yo female with profound intellectual disability, history of autism spectrum disorder  and epilepsy who lives with adopted mother (maternal aunt adopted her as an infant). She lives in [place] with Carlos, her uncle (carlos's ), her biological brother 16 and biological sister 18. Eboni has history of global developmental delay with deficits in intellectual and adaptive functioning. She lacks ability to communicate verbally, lacks control of her bowel function.  She has an IEP in place and also has Rajwinder Devi including a  Valery Mantilla. She attends [employer] where she can go until the age of 20.     Eboni is managed in clinic for aggression, self injurious behaviors, insomnia. For the seizure disorder/grand mal seizures, started 2 years ago and takes Keppra 1000 mg twice daily.  She is followed by [facility]. She receives in house therapy. Mother contacted her  to inquire about respite services - however such services are not available at this time due to budget deficit,  as per the .      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, controlled (CMS/[facility])  New diagnosis of Right Ovarian Cyst and Kidney stones      Other orders  -  Continue CloNIDine (CATAPRES) 0.3 mg for insomnia  -  Continue Mirtazapine 7.5mg QHS for insomnia and anxiety  -  Consider trazodone in the future for sleep if above ineffective   -  risperiDONE (risperDAL) 1 MG tablet morning and night for aggression increased to 2mg BID for self injury and irritability with agitation   -  Discontinue Seroquel 50mg po nightly due to no effect  - discussed behavior modification for toilet training   - Discussed obtaining labwork/metabolic profile. However patient is non-compliant with any needle sticks. Mother will fax labs done at outside providers. Will coordinate it with any procedure such as dental work when she will be sedated.  - We discussed risks, benefits, and side effects of the above medication and the motherwas agreeable with the plan. Mother is aware of the risks associated including akathesia, dystonia, tardive dyskinesia, prolactin changes and cardiac conduction complications. She provides verbal consent.  -  DOUGLAS reviewed and appropriate. Patient counseled on use of controlled substances.   -  Reviewed previous charting as this patient is new to this provider     F/up in 4-6 weeks     The patient was instructed to call clinic as needed or go to ER if in crisis.

## 2019-12-02 ENCOUNTER — TRANSCRIBE ORDERS (OUTPATIENT)
Dept: LAB | Facility: HOSPITAL | Age: 19
End: 2019-12-02

## 2019-12-02 ENCOUNTER — LAB (OUTPATIENT)
Dept: LAB | Facility: HOSPITAL | Age: 19
End: 2019-12-02

## 2019-12-02 ENCOUNTER — HOSPITAL ENCOUNTER (OUTPATIENT)
Dept: GENERAL RADIOLOGY | Facility: HOSPITAL | Age: 19
Discharge: HOME | End: 2019-12-02
Admitting: UROLOGY

## 2019-12-02 DIAGNOSIS — N20.0 KIDNEY STONE: ICD-10-CM

## 2019-12-02 DIAGNOSIS — N20.0 KIDNEY STONE: Primary | ICD-10-CM

## 2019-12-02 LAB
ALBUMIN SERPL-MCNC: 4.4 G/DL (ref 3.5–5.2)
ALBUMIN/GLOB SERPL: 1.2 G/DL
ALP SERPL-CCNC: 81 U/L (ref 39–117)
ALT SERPL W P-5'-P-CCNC: 11 U/L (ref 1–33)
ANION GAP SERPL CALCULATED.3IONS-SCNC: 12.2 MMOL/L (ref 5–15)
AST SERPL-CCNC: 11 U/L (ref 1–32)
BILIRUB SERPL-MCNC: 0.9 MG/DL (ref 0.2–1.2)
BUN BLD-MCNC: 14 MG/DL (ref 6–20)
BUN/CREAT SERPL: 14.3 (ref 7–25)
CALCIUM SPEC-SCNC: 9.8 MG/DL (ref 8.6–10.5)
CHLORIDE SERPL-SCNC: 103 MMOL/L (ref 98–107)
CO2 SERPL-SCNC: 25.8 MMOL/L (ref 22–29)
CREAT BLD-MCNC: 0.98 MG/DL (ref 0.57–1)
GFR SERPL CREATININE-BSD FRML MDRD: 88 ML/MIN/1.73
GLOBULIN UR ELPH-MCNC: 3.7 GM/DL
GLUCOSE BLD-MCNC: 95 MG/DL (ref 65–99)
POTASSIUM BLD-SCNC: 4.3 MMOL/L (ref 3.5–5.2)
PROT SERPL-MCNC: 8.1 G/DL (ref 6–8.5)
SODIUM BLD-SCNC: 141 MMOL/L (ref 136–145)

## 2019-12-02 PROCEDURE — 80053 COMPREHEN METABOLIC PANEL: CPT

## 2019-12-02 PROCEDURE — 74018 RADEX ABDOMEN 1 VIEW: CPT

## 2019-12-02 PROCEDURE — 36415 COLL VENOUS BLD VENIPUNCTURE: CPT

## 2019-12-24 NOTE — ED PROVIDER NOTES
Mailed off lab orders to patient, last CMP was in 2017   Subjective   19 yr Female, nonverbal, and severely mentally disabled presents with her family member for reported possible abdominal pain, mom states that at 5:00 this morning she was in excruciating abdominal pain, she was holding her belly and doubled over in pain, she is been screaming in pain multiple times throughout the morning and holding her belly.  Her mom also states that her appetite was decreased and she did not drink as much fluid as she normally does.  The patient is nonverbal and cannot give any history, upon my examination she is resting comfortably in the bed        History provided by:  Parent  History limited by:  Patient nonverbal   used: No        Review of Systems   Gastrointestinal: Positive for abdominal pain.   All other systems reviewed and are negative.      Past Medical History:   Diagnosis Date   • Autism    • Chronic pelvic pain in female    • Communication deficit    • Encopresis    • Enuresis    • Global developmental delay    • History of kidney stones     6 WEEKS AGO   • Multiple developmental ovarian cysts    • Scoliosis    • Seizures (CMS/HCC) 2018    LAST SEIZURE WAS ONE WEEK AGO-PETITE MAL.  LAST GRAND MAL SEIZURE WAS 4-5 MONTHS AGO       No Known Allergies    Past Surgical History:   Procedure Laterality Date   • DIAGNOSTIC LAPAROSCOPY N/A 9/11/2018    Procedure: DIAGNOSTIC LAPAROSCOPY drainage of right ovarian cyst;  Surgeon: Amol Ospina MD;  Location: [facility];  Service: Obstetrics/Gynecology   • OTHER SURGICAL HISTORY      IUD PLACEMENT IN OR   • URETEROSCOPY LASER LITHOTRIPSY WITH STENT INSERTION  2018       Family History   Problem Relation Age of Onset   • Drug abuse Mother    • Anxiety disorder Mother    • Depression Mother    • Drug abuse Father        Social History     Socioeconomic History   • Marital status: Single     Spouse name: Not on file   • Number of children: Not on file   • Years of education: Not on file   • Highest education  level: Not on file   Tobacco Use   • Smoking status: Never Smoker   • Smokeless tobacco: Never Used   Substance and Sexual Activity   • Alcohol use: No   • Drug use: No   • Sexual activity: Defer           Objective   Physical Exam   Constitutional: She appears well-developed and well-nourished.  Non-toxic appearance. She does not appear ill.   Neck: Normal range of motion. Neck supple.   Cardiovascular: Normal rate and regular rhythm.   Pulmonary/Chest: Effort normal and breath sounds normal.   Abdominal: Soft. Bowel sounds are normal. There is no tenderness. There is no rigidity, no rebound and no guarding.   Musculoskeletal: Normal range of motion.   Neurological: She is alert. She has normal reflexes.   Skin: Skin is warm and dry.   Nursing note and vitals reviewed.      Procedures           ED Course                  MDM  Number of Diagnoses or Management Options  Abdominal pain, unspecified abdominal location: new and requires workup     Amount and/or Complexity of Data Reviewed  Clinical lab tests: reviewed  Tests in the radiology section of CPT®: reviewed    Risk of Complications, Morbidity, and/or Mortality  Presenting problems: low  Diagnostic procedures: low  Management options: low    Patient Progress  Patient progress: stable      Final diagnoses:   Abdominal pain, unspecified abdominal location              Shashi Romero Jr., PA-C  10/10/19 4169

## 2020-02-06 RX ORDER — RISPERIDONE 2 MG/1
2 TABLET ORAL 2 TIMES DAILY
Qty: 60 TABLET | Refills: 2 | Status: SHIPPED | OUTPATIENT
Start: 2020-02-06 | End: 2020-04-24 | Stop reason: SDUPTHER

## 2020-02-06 NOTE — TELEPHONE ENCOUNTER
AUNT CALLED IN AND MADE PATIENT AN APPOINTMENT WITH YOU ON 02/28/20. AUNT REQUESTED A REFILL ON PT'S RISPERDAL 2 MG TAB. [facility]. THANKS

## 2020-02-28 ENCOUNTER — OFFICE VISIT (OUTPATIENT)
Dept: PSYCHIATRY | Facility: CLINIC | Age: 20
End: 2020-02-28

## 2020-02-28 VITALS
HEART RATE: 108 BPM | WEIGHT: 218 LBS | HEIGHT: 68 IN | BODY MASS INDEX: 33.04 KG/M2 | DIASTOLIC BLOOD PRESSURE: 74 MMHG | SYSTOLIC BLOOD PRESSURE: 132 MMHG

## 2020-02-28 DIAGNOSIS — R46.89 AGGRESSION: ICD-10-CM

## 2020-02-28 DIAGNOSIS — F79 INTELLECTUAL DISABILITY: ICD-10-CM

## 2020-02-28 DIAGNOSIS — G40.909 SEIZURE DISORDER (HCC): ICD-10-CM

## 2020-02-28 DIAGNOSIS — F84.0 AUTISM SPECTRUM DISORDER REQUIRING VERY SUBSTANTIAL SUPPORT (LEVEL 3): Primary | ICD-10-CM

## 2020-02-28 DIAGNOSIS — F80.9 PROBLEMS WITH COMMUNICATION: ICD-10-CM

## 2020-02-28 DIAGNOSIS — F41.8 OTHER SPECIFIED ANXIETY DISORDERS: ICD-10-CM

## 2020-02-28 PROCEDURE — 99214 OFFICE O/P EST MOD 30 MIN: CPT | Performed by: PSYCHIATRY & NEUROLOGY

## 2020-02-28 RX ORDER — LEVETIRACETAM 1000 MG/1
1000 TABLET ORAL 2 TIMES DAILY
Qty: 60 TABLET | Refills: 7
Start: 2020-02-28 | End: 2021-01-29

## 2020-02-28 RX ORDER — CLONAZEPAM 0.5 MG/1
0.5 TABLET ORAL 2 TIMES DAILY
Qty: 60 TABLET | Refills: 0 | Status: SHIPPED | OUTPATIENT
Start: 2020-02-28 | End: 2020-04-24

## 2020-02-28 RX ORDER — CLONIDINE HYDROCHLORIDE 0.3 MG/1
0.3 TABLET ORAL NIGHTLY
Qty: 30 TABLET | Refills: 2 | Status: SHIPPED | OUTPATIENT
Start: 2020-02-28 | End: 2020-04-24 | Stop reason: SDUPTHER

## 2020-03-01 NOTE — PROGRESS NOTES
Subjective   Eboni Hernandez is a 19 y.o. female who presents today for follow up    This provider is located at [facility], [facility], [address] [place] [address], using Mo[employer].  The patient is seen remotely at [facility], Behavioral health, [address], [address] in [facility] via Vid[employer], an encrypted service from one [facility] to another, with staff present. The patient's condition being diagnosed/treated is appropriate for telemedecine.  The provider identified himself as well as his credentials.      The patient and/or patient's guardian consent to be seen remotely, and when consent is given they understand that the consent allows for patient identifiable information to be sent to a third party as needed.  They may refuse to be seen remotely at any time.  The electronic data is encrypted and password protected, and the patient has been advised of the potential risks to privacy notwithstanding such measures.      Chief Complaint: Intellectual disability and autism spectrum disorder    History of Present Illness: Patient presents with her mother who is the primary caregiver.  I have not seen the patient since October of last year and since that time there have been multiple developments.  Patient continued to have anxiety and acting out behaviors.  She is largely nonverbal so her mother had her evaluated for pain as she previously had kidney stones and acted in a similar manner.  She was found to have kidney stones but after this resolved, she continued to have symptoms.  She was reevaluated and it was found that her behaviors are likely due to anxiety.  Mirtazapine was discontinued.  She was tried on Valium briefly which helped her symptoms.  Patient has continued on clonidine and Risperdal for good effect for insomnia, behavioral disturbance, and anxiety.  We will initiate clonazepam 0.5 mg twice daily to see if we can get anxiety under better  control.    The following portions of the patient's history were reviewed and updated as appropriate: allergies, current medications, past family history, past medical history, past social history, past surgical history and problem list.      Past Medical History:  Past Medical History:   Diagnosis Date   • Autism    • Chronic pelvic pain in female    • Communication deficit    • Encopresis    • Enuresis    • Global developmental delay    • History of kidney stones     6 WEEKS AGO   • Multiple developmental ovarian cysts    • Scoliosis    • Seizures (CMS/HCC) 2018    LAST SEIZURE WAS ONE WEEK AGO-PETITE MAL.  LAST GRAND MAL SEIZURE WAS 4-5 MONTHS AGO       Social History:  Social History     Socioeconomic History   • Marital status: Single     Spouse name: Not on file   • Number of children: Not on file   • Years of education: Not on file   • Highest education level: Not on file   Tobacco Use   • Smoking status: Never Smoker   • Smokeless tobacco: Never Used   Substance and Sexual Activity   • Alcohol use: No   • Drug use: No   • Sexual activity: Defer       Family History:  Family History   Problem Relation Age of Onset   • Drug abuse Mother    • Anxiety disorder Mother    • Depression Mother    • Drug abuse Father    • ADD / ADHD Neg Hx    • Alcohol abuse Neg Hx    • Bipolar disorder Neg Hx    • Dementia Neg Hx    • OCD Neg Hx    • Paranoid behavior Neg Hx    • Schizophrenia Neg Hx    • Seizures Neg Hx    • Self-Injurious Behavior  Neg Hx    • Suicide Attempts Neg Hx        Past Surgical History:  Past Surgical History:   Procedure Laterality Date   • DIAGNOSTIC LAPAROSCOPY N/A 9/11/2018    Procedure: DIAGNOSTIC LAPAROSCOPY drainage of right ovarian cyst;  Surgeon: Amol Ospina MD;  Location: [facility];  Service: Obstetrics/Gynecology   • INTRAUTERINE DEVICE INSERTION     • OTHER SURGICAL HISTORY      IUD PLACEMENT IN OR   • URETEROSCOPY LASER LITHOTRIPSY WITH STENT INSERTION  2018       Problem  "List:  Patient Active Problem List   Diagnosis   • Problems with communication   • Intellectual disability, profound   • Encopresis   • Enuresis   • Grand mal epilepsy, controlled (CMS/[facility])   • Aggression   • Insomnia due to other mental disorder   • Autism spectrum disorder   • Cyst of ovary   • Pelvic pain   • Nephrolithiasis   • Nexplanon in place       Allergy:   No Known Allergies     Current Medications:   Current Outpatient Medications   Medication Sig Dispense Refill   • cloNIDine (CATAPRES) 0.3 MG tablet Take 1 tablet by mouth Every Night. 30 tablet 2   • ibuprofen (ADVIL,MOTRIN) 800 MG tablet Take 1 tablet by mouth Every 8 (Eight) Hours As Needed for Mild Pain. 30 tablet 0   • levETIRAcetam (KEPPRA) 1000 MG tablet Take 1 tablet by mouth 2 (Two) Times a Day. 60 tablet 7   • risperiDONE (RISPERDAL) 2 MG tablet Take 1 tablet by mouth 2 (Two) Times a Day. 60 tablet 2   • clonazePAM (KLONOPIN) 0.5 MG tablet Take 1 tablet by mouth 2 (Two) Times a Day. 60 tablet 0     No current facility-administered medications for this visit.        Review of Symptoms:    Review of Systems   Constitutional: Positive for activity change.   HENT: Negative.    Eyes: Negative.    Respiratory: Negative.    Cardiovascular: Negative.    Gastrointestinal: Negative.    Endocrine: Negative.    Genitourinary: Negative.    Musculoskeletal: Negative.    Allergic/Immunologic: Negative.    Neurological: Positive for seizures and speech difficulty (nonverbal).   Hematological: Negative.    Psychiatric/Behavioral: Positive for agitation, behavioral problems, sleep disturbance and positive for hyperactivity. The patient is nervous/anxious.          Physical Exam:   Blood pressure 132/74, pulse 108, height 172.7 cm (68\"), weight 98.9 kg (218 lb), not currently breastfeeding.    Appearance: -American female of stated age, no acute distress, nonverbal  Gait, Station, Strength: WNL    Mental Status Exam:   Hygiene:   good  Cooperation:  " noncooperative, apathetic, does not engage  Eye Contact:  Poor  Psychomotor Behavior:  Appropriate  Affect:  Blunted  Mood: anxious  Speech:  Mute and lack of meaningful speech, makes noises  Thought Process:  Unable to demonstrate  Thought Content:  Unable to demonstrate  Suicidal:  unable to assess  Homicidal:  unable to assess  Hallucinations:  unable to assess  Delusion:  Unable to demonstrate  Memory:  Unable to evaluate  Orientation:  Unable to evaluate  Reliability:  unable to assess  Insight:  unable to assess  Judgement:  Impaired  Impulse Control:  Impaired  Physical/Medical Issues:  Yes seizure disorder       Lab Results:   No visits with results within 1 Month(s) from this visit.   Latest known visit with results is:   Lab on 12/02/2019   Component Date Value Ref Range Status   • Glucose 12/02/2019 95  65 - 99 mg/dL Final   • BUN 12/02/2019 14  6 - 20 mg/dL Final   • Creatinine 12/02/2019 0.98  0.57 - 1.00 mg/dL Final   • Sodium 12/02/2019 141  136 - 145 mmol/L Final   • Potassium 12/02/2019 4.3  3.5 - 5.2 mmol/L Final   • Chloride 12/02/2019 103  98 - 107 mmol/L Final   • CO2 12/02/2019 25.8  22.0 - 29.0 mmol/L Final   • Calcium 12/02/2019 9.8  8.6 - 10.5 mg/dL Final   • Total Protein 12/02/2019 8.1  6.0 - 8.5 g/dL Final   • Albumin 12/02/2019 4.40  3.50 - 5.20 g/dL Final   • ALT (SGPT) 12/02/2019 11  1 - 33 U/L Final   • AST (SGOT) 12/02/2019 11  1 - 32 U/L Final   • Alkaline Phosphatase 12/02/2019 81  39 - 117 U/L Final   • Total Bilirubin 12/02/2019 0.9  0.2 - 1.2 mg/dL Final   • eGFR   Amer 12/02/2019 88  >60 mL/min/1.73 Final   • Globulin 12/02/2019 3.7  gm/dL Final   • A/G Ratio 12/02/2019 1.2  g/dL Final   • BUN/Creatinine Ratio 12/02/2019 14.3  7.0 - 25.0 Final   • Anion Gap 12/02/2019 12.2  5.0 - 15.0 mmol/L Final       Assessment/Plan   Diagnoses and all orders for this visit:    Autism spectrum disorder requiring very substantial support (level 3)  -     cloNIDine (CATAPRES) 0.3 MG  tablet; Take 1 tablet by mouth Every Night.  -     clonazePAM (KLONOPIN) 0.5 MG tablet; Take 1 tablet by mouth 2 (Two) Times a Day.    Aggression  -     cloNIDine (CATAPRES) 0.3 MG tablet; Take 1 tablet by mouth Every Night.  -     clonazePAM (KLONOPIN) 0.5 MG tablet; Take 1 tablet by mouth 2 (Two) Times a Day.    Intellectual disability, profound    Problems with communication    Seizure disorder (CMS/[facility])  -     levETIRAcetam (KEPPRA) 1000 MG tablet; Take 1 tablet by mouth 2 (Two) Times a Day.    Other specified anxiety disorders  -     cloNIDine (CATAPRES) 0.3 MG tablet; Take 1 tablet by mouth Every Night.  -     clonazePAM (KLONOPIN) 0.5 MG tablet; Take 1 tablet by mouth 2 (Two) Times a Day.    -Patient presenting with increased anxious symptoms as characterized by acting out behaviors, and visible distress.  Unable to discuss in a meaningful way due to patient's severe autism spectrum disorder and intellectual disability.  -Reviewed previous documentation  -Reviewed most recent available labs  -Will run Ryan, unable to get today due to system error  -Continue Risperdal 2 mg twice daily for autism spectrum disorder, behavioral disturbance, and anxiety  -Patient was tried on shortness been for benzodiazepines by outside providers due to her physical symptoms appearing to be related to anxiety.  She responded well to these medications.  -Start clonazepam 0.5 mg p.o. twice daily for anxiety, behavioral disturbance, autism spectrum disorder  -Continue clonidine 0.3 mg p.o. nightly for insomnia, behaviors, and anxiety  -Continue Keppra 1000 mg twice daily for seizure disorder    Visit Diagnoses:    ICD-10-CM ICD-9-CM   1. Autism spectrum disorder requiring very substantial support (level 3) F84.0 299.00   2. Aggression R46.89 V40.39   3. Intellectual disability, profound F79 319   4. Problems with communication F80.9 V40.1   5. Seizure disorder (CMS/HCC) G40.909 345.90   6. Other specified anxiety disorders F41.8  300.09       TREATMENT PLAN/GOALS: Continue supportive psychotherapy efforts and medications as indicated. Treatment and medication options discussed during today's visit. Patient acknowledged and verbally consented to continue with current treatment plan and was educated on the importance of compliance with treatment and follow-up appointments.    MEDICATION ISSUES:    Discussed medication options and treatment plan of prescribed medication as well as the risks, benefits, and side effects including potential falls, possible impaired driving and metabolic adversities among others. Patient is agreeable to call the office with any worsening of symptoms or onset of side effects. Patient is agreeable to call 911 or go to the nearest ER should he/she begin having SI/HI.     MEDS ORDERED DURING VISIT:  New Medications Ordered This Visit   Medications   • levETIRAcetam (KEPPRA) 1000 MG tablet     Sig: Take 1 tablet by mouth 2 (Two) Times a Day.     Dispense:  60 tablet     Refill:  7   • cloNIDine (CATAPRES) 0.3 MG tablet     Sig: Take 1 tablet by mouth Every Night.     Dispense:  30 tablet     Refill:  2   • clonazePAM (KLONOPIN) 0.5 MG tablet     Sig: Take 1 tablet by mouth 2 (Two) Times a Day.     Dispense:  60 tablet     Refill:  0       Return in about 3 months (around 5/28/2020).             This document has been electronically signed by Simon Kapoor MD  March 1, 2020 4:47 PM

## 2020-03-02 ENCOUNTER — TELEPHONE (OUTPATIENT)
Dept: PSYCHIATRY | Facility: CLINIC | Age: 20
End: 2020-03-02

## 2020-03-02 DIAGNOSIS — R46.89 AGGRESSION: ICD-10-CM

## 2020-03-02 DIAGNOSIS — F84.0 AUTISM SPECTRUM DISORDER REQUIRING VERY SUBSTANTIAL SUPPORT (LEVEL 3): Primary | ICD-10-CM

## 2020-03-02 DIAGNOSIS — F79 INTELLECTUAL DISABILITY: ICD-10-CM

## 2020-03-02 RX ORDER — GABAPENTIN 100 MG/1
100 CAPSULE ORAL 3 TIMES DAILY
Qty: 90 CAPSULE | Refills: 2 | Status: SHIPPED | OUTPATIENT
Start: 2020-03-02 | End: 2020-04-24

## 2020-03-02 NOTE — TELEPHONE ENCOUNTER
I sent into the [facility], Gabapentin 100mg TID. Advise to try this and stop Clonazepam.

## 2020-03-02 NOTE — TELEPHONE ENCOUNTER
CALLED KEYUR AND TOLD HER GABAPENTIN HAS BEEN SENT IN TO [facility] AND GAVE DIRECTIONS.

## 2020-03-02 NOTE — TELEPHONE ENCOUNTER
GUARDIAN CALLED IN STATING SHE THINKS NEW MED (KLONOPIN) IS NOT AGREEING WITH PATIENT.KEYUR SAID FIRST DAY PATIENT TOOK SHE COULDN'T HOLD HER HEAD UP OR AT NIGHT. NEXT DAY PT WAS AGGRESSIVE, STOMPING FEET, SMACKING COUCH, SMACKING LEGS. KEYUR GAVE MED TO PT THIS MORNING AND PT IS OUT OF CONTROL (KEYUR'S OWN WORDS). GUARDIAN ASKED IF THERE IS SOMETHING ELSE YOU COULD TRY HER ON. PLEASE ADVISE. THANKS

## 2020-03-08 ENCOUNTER — APPOINTMENT (OUTPATIENT)
Dept: CT IMAGING | Facility: HOSPITAL | Age: 20
End: 2020-03-08

## 2020-03-08 ENCOUNTER — HOSPITAL ENCOUNTER (EMERGENCY)
Facility: HOSPITAL | Age: 20
Discharge: HOME | End: 2020-03-08
Attending: EMERGENCY MEDICINE | Admitting: EMERGENCY MEDICINE

## 2020-03-08 VITALS
BODY MASS INDEX: 34.53 KG/M2 | OXYGEN SATURATION: 96 % | WEIGHT: 220 LBS | HEIGHT: 67 IN | SYSTOLIC BLOOD PRESSURE: 102 MMHG | RESPIRATION RATE: 18 BRPM | HEART RATE: 63 BPM | TEMPERATURE: 98.9 F | DIASTOLIC BLOOD PRESSURE: 70 MMHG

## 2020-03-08 DIAGNOSIS — K59.00 CONSTIPATION, UNSPECIFIED CONSTIPATION TYPE: ICD-10-CM

## 2020-03-08 DIAGNOSIS — N30.00 ACUTE CYSTITIS WITHOUT HEMATURIA: Primary | ICD-10-CM

## 2020-03-08 LAB
ALBUMIN SERPL-MCNC: 4 G/DL (ref 3.5–5.2)
ALBUMIN/GLOB SERPL: 1.2 G/DL
ALP SERPL-CCNC: 76 U/L (ref 39–117)
ALT SERPL W P-5'-P-CCNC: 16 U/L (ref 1–33)
ANION GAP SERPL CALCULATED.3IONS-SCNC: 12.4 MMOL/L (ref 5–15)
AST SERPL-CCNC: 13 U/L (ref 1–32)
BACTERIA UR QL AUTO: ABNORMAL /HPF
BASOPHILS # BLD AUTO: 0.04 10*3/MM3 (ref 0–0.2)
BASOPHILS NFR BLD AUTO: 0.7 % (ref 0–1.5)
BILIRUB SERPL-MCNC: 0.4 MG/DL (ref 0.2–1.2)
BILIRUB UR QL STRIP: NEGATIVE
BUN BLD-MCNC: 10 MG/DL (ref 6–20)
BUN/CREAT SERPL: 13.2 (ref 7–25)
CALCIUM SPEC-SCNC: 9.6 MG/DL (ref 8.6–10.5)
CHLORIDE SERPL-SCNC: 103 MMOL/L (ref 98–107)
CLARITY UR: CLEAR
CO2 SERPL-SCNC: 25.6 MMOL/L (ref 22–29)
COLOR UR: YELLOW
CREAT BLD-MCNC: 0.76 MG/DL (ref 0.57–1)
DEPRECATED RDW RBC AUTO: 38.3 FL (ref 37–54)
EOSINOPHIL # BLD AUTO: 0.1 10*3/MM3 (ref 0–0.4)
EOSINOPHIL NFR BLD AUTO: 1.8 % (ref 0.3–6.2)
ERYTHROCYTE [DISTWIDTH] IN BLOOD BY AUTOMATED COUNT: 12.5 % (ref 12.3–15.4)
GFR SERPL CREATININE-BSD FRML MDRD: 118 ML/MIN/1.73
GLOBULIN UR ELPH-MCNC: 3.3 GM/DL
GLUCOSE BLD-MCNC: 106 MG/DL (ref 65–99)
GLUCOSE UR STRIP-MCNC: NEGATIVE MG/DL
HCT VFR BLD AUTO: 38.5 % (ref 34–46.6)
HGB BLD-MCNC: 12.7 G/DL (ref 12–15.9)
HGB UR QL STRIP.AUTO: ABNORMAL
HOLD SPECIMEN: NORMAL
HOLD SPECIMEN: NORMAL
HYALINE CASTS UR QL AUTO: ABNORMAL /LPF
IMM GRANULOCYTES # BLD AUTO: 0 10*3/MM3 (ref 0–0.05)
IMM GRANULOCYTES NFR BLD AUTO: 0 % (ref 0–0.5)
KETONES UR QL STRIP: NEGATIVE
LEUKOCYTE ESTERASE UR QL STRIP.AUTO: NEGATIVE
LIPASE SERPL-CCNC: 12 U/L (ref 13–60)
LYMPHOCYTES # BLD AUTO: 2.4 10*3/MM3 (ref 0.7–3.1)
LYMPHOCYTES NFR BLD AUTO: 43.7 % (ref 19.6–45.3)
MCH RBC QN AUTO: 27.9 PG (ref 26.6–33)
MCHC RBC AUTO-ENTMCNC: 33 G/DL (ref 31.5–35.7)
MCV RBC AUTO: 84.6 FL (ref 79–97)
MONOCYTES # BLD AUTO: 0.55 10*3/MM3 (ref 0.1–0.9)
MONOCYTES NFR BLD AUTO: 10 % (ref 5–12)
MUCOUS THREADS URNS QL MICRO: ABNORMAL /HPF
NEUTROPHILS # BLD AUTO: 2.4 10*3/MM3 (ref 1.7–7)
NEUTROPHILS NFR BLD AUTO: 43.8 % (ref 42.7–76)
NITRITE UR QL STRIP: NEGATIVE
NRBC BLD AUTO-RTO: 0 /100 WBC (ref 0–0.2)
PH UR STRIP.AUTO: <=5 [PH] (ref 5–8)
PLATELET # BLD AUTO: 226 10*3/MM3 (ref 140–450)
PMV BLD AUTO: 11.7 FL (ref 6–12)
POTASSIUM BLD-SCNC: 4.1 MMOL/L (ref 3.5–5.2)
PROT SERPL-MCNC: 7.3 G/DL (ref 6–8.5)
PROT UR QL STRIP: NEGATIVE
RBC # BLD AUTO: 4.55 10*6/MM3 (ref 3.77–5.28)
RBC # UR: ABNORMAL /HPF
REF LAB TEST METHOD: ABNORMAL
SODIUM BLD-SCNC: 141 MMOL/L (ref 136–145)
SP GR UR STRIP: 1.03 (ref 1–1.03)
SQUAMOUS #/AREA URNS HPF: ABNORMAL /HPF
UROBILINOGEN UR QL STRIP: ABNORMAL
WBC NRBC COR # BLD: 5.49 10*3/MM3 (ref 3.4–10.8)
WBC UR QL AUTO: ABNORMAL /HPF
WHOLE BLOOD HOLD SPECIMEN: NORMAL
WHOLE BLOOD HOLD SPECIMEN: NORMAL

## 2020-03-08 PROCEDURE — 81001 URINALYSIS AUTO W/SCOPE: CPT | Performed by: PHYSICIAN ASSISTANT

## 2020-03-08 PROCEDURE — P9612 CATHETERIZE FOR URINE SPEC: HCPCS

## 2020-03-08 PROCEDURE — 74177 CT ABD & PELVIS W/CONTRAST: CPT

## 2020-03-08 PROCEDURE — 83690 ASSAY OF LIPASE: CPT | Performed by: PHYSICIAN ASSISTANT

## 2020-03-08 PROCEDURE — 96360 HYDRATION IV INFUSION INIT: CPT

## 2020-03-08 PROCEDURE — 80053 COMPREHEN METABOLIC PANEL: CPT | Performed by: PHYSICIAN ASSISTANT

## 2020-03-08 PROCEDURE — 99283 EMERGENCY DEPT VISIT LOW MDM: CPT

## 2020-03-08 PROCEDURE — 85025 COMPLETE CBC W/AUTO DIFF WBC: CPT | Performed by: PHYSICIAN ASSISTANT

## 2020-03-08 PROCEDURE — 25010000002 IOPAMIDOL 61 % SOLUTION: Performed by: EMERGENCY MEDICINE

## 2020-03-08 RX ORDER — SODIUM CHLORIDE 0.9 % (FLUSH) 0.9 %
10 SYRINGE (ML) INJECTION AS NEEDED
Status: DISCONTINUED | OUTPATIENT
Start: 2020-03-08 | End: 2020-03-08 | Stop reason: HOSPADM

## 2020-03-08 RX ORDER — CEFUROXIME AXETIL 500 MG/1
500 TABLET ORAL 2 TIMES DAILY
Qty: 10 TABLET | Refills: 0 | Status: SHIPPED | OUTPATIENT
Start: 2020-03-08 | End: 2020-03-13

## 2020-03-08 RX ADMIN — IOPAMIDOL 100 ML: 612 INJECTION, SOLUTION INTRAVENOUS at 10:37

## 2020-03-08 RX ADMIN — SODIUM CHLORIDE 1000 ML: 9 INJECTION, SOLUTION INTRAVENOUS at 10:00

## 2020-03-08 NOTE — ED PROVIDER NOTES
Subjective   19-year-old female presents with abdominal pain, she is here with her guardian who reports that she has been holding her abdomen, she has severe mental disability, and is nonverbal, and her guardian states it difficult to determine what might be wrong with her.  She is been in the emergency department in the past with abdominal pain, she has had a history of constipation and kidney stones.  Her guardian also states that she has not had a bowel movement in 2 days and she may be constipated.  Her appetite is down, she has not had any nausea or vomiting.  No fever or chills.      History provided by:  Patient   used: No        Review of Systems   Unable to perform ROS: Patient nonverbal       Past Medical History:   Diagnosis Date   • Autism    • Chronic pelvic pain in female    • Communication deficit    • Encopresis    • Enuresis    • Global developmental delay    • History of kidney stones     6 WEEKS AGO   • Multiple developmental ovarian cysts    • Scoliosis    • Seizures (CMS/HCC) 2018    LAST SEIZURE WAS ONE WEEK AGO-PETITE MAL.  LAST GRAND MAL SEIZURE WAS 4-5 MONTHS AGO       No Known Allergies    Past Surgical History:   Procedure Laterality Date   • DIAGNOSTIC LAPAROSCOPY N/A 9/11/2018    Procedure: DIAGNOSTIC LAPAROSCOPY drainage of right ovarian cyst;  Surgeon: Amol Ospina MD;  Location: [facility];  Service: Obstetrics/Gynecology   • INTRAUTERINE DEVICE INSERTION     • OTHER SURGICAL HISTORY      IUD PLACEMENT IN OR   • URETEROSCOPY LASER LITHOTRIPSY WITH STENT INSERTION  2018       Family History   Problem Relation Age of Onset   • Drug abuse Mother    • Anxiety disorder Mother    • Depression Mother    • Drug abuse Father    • ADD / ADHD Neg Hx    • Alcohol abuse Neg Hx    • Bipolar disorder Neg Hx    • Dementia Neg Hx    • OCD Neg Hx    • Paranoid behavior Neg Hx    • Schizophrenia Neg Hx    • Seizures Neg Hx    • Self-Injurious Behavior  Neg Hx    • Suicide  Attempts Neg Hx        Social History     Socioeconomic History   • Marital status: Single     Spouse name: Not on file   • Number of children: Not on file   • Years of education: Not on file   • Highest education level: Not on file   Tobacco Use   • Smoking status: Never Smoker   • Smokeless tobacco: Never Used   Substance and Sexual Activity   • Alcohol use: No   • Drug use: No   • Sexual activity: Defer           Objective   Physical Exam   Constitutional: She appears well-developed and well-nourished.   HENT:   Head: Normocephalic.   Neck: Normal range of motion. Neck supple.   Cardiovascular: Normal rate and regular rhythm.   Pulmonary/Chest: Effort normal and breath sounds normal.   Abdominal: Soft. Bowel sounds are normal.   Musculoskeletal: Normal range of motion.   Neurological: She is alert. She has normal reflexes.   Skin: Skin is warm and dry.   Psychiatric: She has a normal mood and affect.   Nursing note and vitals reviewed.      Procedures           ED Course  ED Course as of Mar 08 1112   Sun Mar 08, 2020   1109 Mild to moderate amount of stool in the colon, mom states that she has given enemas and suppositories in the past, she is comfortable giving at home, she will continue bowel regiment with her.  Abdomen is soft.  She is had some small bacteria white blood cells and red blood cells in her urine, she is prone to urinary tract infections, I will treat her with an antibiotic.  She is otherwise stable nontoxic-appearing.    [CS]      ED Course User Index  [CS] Shashi Romero Jr., ARIEL                                           MDM  Number of Diagnoses or Management Options  Acute cystitis without hematuria: new and requires workup  Constipation, unspecified constipation type: new and requires workup     Amount and/or Complexity of Data Reviewed  Clinical lab tests: reviewed  Tests in the radiology section of CPT®: reviewed    Risk of Complications, Morbidity, and/or Mortality  Presenting  problems: minimal  Diagnostic procedures: minimal  Management options: minimal    Patient Progress  Patient progress: stable      Final diagnoses:   Acute cystitis without hematuria   Constipation, unspecified constipation type            Shashi Romero Jr., PA-C  03/08/20 1112

## 2020-03-24 ENCOUNTER — TRANSCRIBE ORDERS (OUTPATIENT)
Dept: GENERAL RADIOLOGY | Facility: HOSPITAL | Age: 20
End: 2020-03-24

## 2020-03-24 ENCOUNTER — HOSPITAL ENCOUNTER (OUTPATIENT)
Dept: GENERAL RADIOLOGY | Facility: HOSPITAL | Age: 20
Discharge: HOME | End: 2020-03-24
Admitting: UROLOGY

## 2020-03-24 DIAGNOSIS — N20.0 URIC ACID NEPHROLITHIASIS: Primary | ICD-10-CM

## 2020-03-24 PROCEDURE — 74018 RADEX ABDOMEN 1 VIEW: CPT

## 2020-04-24 ENCOUNTER — TELEMEDICINE (OUTPATIENT)
Dept: PSYCHIATRY | Facility: CLINIC | Age: 20
End: 2020-04-24

## 2020-04-24 DIAGNOSIS — F84.0 AUTISM SPECTRUM DISORDER REQUIRING VERY SUBSTANTIAL SUPPORT (LEVEL 3): ICD-10-CM

## 2020-04-24 DIAGNOSIS — R46.89 AGGRESSION: ICD-10-CM

## 2020-04-24 DIAGNOSIS — F41.8 OTHER SPECIFIED ANXIETY DISORDERS: ICD-10-CM

## 2020-04-24 PROCEDURE — 99214 OFFICE O/P EST MOD 30 MIN: CPT | Performed by: PSYCHIATRY & NEUROLOGY

## 2020-04-24 RX ORDER — CLONIDINE HYDROCHLORIDE 0.3 MG/1
0.3 TABLET ORAL NIGHTLY
Qty: 30 TABLET | Refills: 2 | Status: SHIPPED | OUTPATIENT
Start: 2020-04-24 | End: 2020-07-24 | Stop reason: SDUPTHER

## 2020-04-24 RX ORDER — DIAZEPAM 5 MG/1
5 TABLET ORAL 3 TIMES DAILY PRN
Qty: 60 TABLET | Refills: 0 | Status: SHIPPED | OUTPATIENT
Start: 2020-04-24 | End: 2020-05-26 | Stop reason: SDUPTHER

## 2020-04-24 RX ORDER — RISPERIDONE 2 MG/1
2 TABLET ORAL 2 TIMES DAILY
Qty: 60 TABLET | Refills: 2 | Status: SHIPPED | OUTPATIENT
Start: 2020-04-24 | End: 2020-07-24 | Stop reason: SDUPTHER

## 2020-04-24 NOTE — PROGRESS NOTES
Subjective   Eboni Hernandez is a 19 y.o. female who presents today for follow up    This provider is located at [facility] at [address] [place] [address]. The provider identified himself as well as his credentials.   The Patient is at home using her phone with her guardian because problems with video connection. The patient's condition being diagnosed/treated is appropriate for telemedicine. The patient and guardian gave consent to be seen remotely, and when consent is given they understand that the consent allows for patient identifiable information to be sent to a third party as needed.   They may refuse to be seen remotely at any time. The electronic data is encrypted and password protected, and the patient has been advised of the potential risks to privacy not withstanding such measures        Chief Complaint: Intellectual disability and autism spectrum disorder    History of Present Illness: Patient presents for follow-up for autism spectrum disorder.  This is a telephone visit as patient had difficulty with video visit software.  She presents with her mother who is her guardian over the phone.  Since I last saw the patient, she was hospitalized for kidney stones which her mother suspected at last visit.  Gabapentin was increased and Klonopin was changed to diazepam for anxiety and aggression.  She has tolerated medication changes well and since hospitalization has done well without any major behavioral disturbance.  She is only taking diazepam as needed.  She denies SI/HI/AVH.    The following portions of the patient's history were reviewed and updated as appropriate: allergies, current medications, past family history, past medical history, past social history, past surgical history and problem list.      Past Medical History:  Past Medical History:   Diagnosis Date   • Autism    • Chronic pelvic pain in female    • Communication deficit    • Encopresis    • Enuresis    •  Global developmental delay    • History of kidney stones     6 WEEKS AGO   • Multiple developmental ovarian cysts    • Scoliosis    • Seizures (CMS/HCC) 2018    LAST SEIZURE WAS ONE WEEK AGO-PETITE MAL.  LAST GRAND MAL SEIZURE WAS 4-5 MONTHS AGO       Social History:  Social History     Socioeconomic History   • Marital status: Single     Spouse name: Not on file   • Number of children: Not on file   • Years of education: Not on file   • Highest education level: Not on file   Tobacco Use   • Smoking status: Never Smoker   • Smokeless tobacco: Never Used   Substance and Sexual Activity   • Alcohol use: No   • Drug use: No   • Sexual activity: Defer       Family History:  Family History   Problem Relation Age of Onset   • Drug abuse Mother    • Anxiety disorder Mother    • Depression Mother    • Drug abuse Father    • ADD / ADHD Neg Hx    • Alcohol abuse Neg Hx    • Bipolar disorder Neg Hx    • Dementia Neg Hx    • OCD Neg Hx    • Paranoid behavior Neg Hx    • Schizophrenia Neg Hx    • Seizures Neg Hx    • Self-Injurious Behavior  Neg Hx    • Suicide Attempts Neg Hx        Past Surgical History:  Past Surgical History:   Procedure Laterality Date   • DIAGNOSTIC LAPAROSCOPY N/A 9/11/2018    Procedure: DIAGNOSTIC LAPAROSCOPY drainage of right ovarian cyst;  Surgeon: Amol Ospina MD;  Location: [facility];  Service: Obstetrics/Gynecology   • INTRAUTERINE DEVICE INSERTION     • OTHER SURGICAL HISTORY      IUD PLACEMENT IN OR   • URETEROSCOPY LASER LITHOTRIPSY WITH STENT INSERTION  2018       Problem List:  Patient Active Problem List   Diagnosis   • Problems with communication   • Intellectual disability, profound   • Encopresis   • Enuresis   • 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 30 tablet 2   • diazePAM (VALIUM) 5 MG tablet Take 1 tablet by mouth 3 (Three) Times a Day As Needed for anxiety 60 tablet 0   • gabapentin (NEURONTIN) 600 MG tablet Take 1 tablet by mouth 3 (Three) Times a Day. 90 tablet 3   • ibuprofen (ADVIL,MOTRIN) 800 MG tablet Take 1 tablet by mouth Every 8 (Eight) Hours As Needed for Mild Pain. 30 tablet 0   • levETIRAcetam (KEPPRA) 1000 MG tablet Take 1 tablet by mouth 2 (Two) Times a Day. 60 tablet 7   • levETIRAcetam (KEPPRA) 1000 MG tablet Take 1 tablet by mouth 2 (Two) Times a Day. 60 tablet 7   • oxyCODONE-acetaminophen (PERCOCET)  MG per tablet Take 1 tablet by mouth Every 6 (Six) Hours As Needed for pain. Do not take more than 3 tabs per day 30 tablet 0   • risperiDONE (RisperDAL) 2 MG tablet Take 1 tablet by mouth 2 (Two) Times a Day. 60 tablet 2     No current facility-administered medications for this visit.        Review of Symptoms:    Review of Systems   Constitutional: Positive for activity change.   HENT: Negative.    Eyes: Negative.    Respiratory: Negative.    Cardiovascular: Negative.    Gastrointestinal: Negative.    Endocrine: Negative.    Genitourinary: Negative.    Musculoskeletal: Negative.    Allergic/Immunologic: Negative.    Neurological: Positive for seizures and speech difficulty (nonverbal).   Hematological: Negative.    Psychiatric/Behavioral: Positive for agitation, behavioral problems, sleep disturbance and positive for hyperactivity. The patient is nervous/anxious.          Physical Exam:   not currently breastfeeding. Unable to assess, telephone visit.     Appearance: Unable to assess, telephone visit.   Gait, Station, Strength: Unable to assess, telephone visit.     Mental Status Exam:   Hygiene:   Unable to assess, telephone visit.   Cooperation:  noncooperative, apathetic, does not engage  Eye Contact:   Unable to assess, telephone visit.   Psychomotor Behavior:  Unable to assess, telephone visit.   Affect:  Blunted  Mood: anxious  Speech:  Mute and lack of meaningful speech, makes noises  Thought Process:  Unable to demonstrate  Thought Content:  Unable to demonstrate  Suicidal:  unable to assess  Homicidal:  unable to assess  Hallucinations:  unable to assess  Delusion:  Unable to demonstrate  Memory:  Unable to evaluate  Orientation:  Unable to evaluate  Reliability:  unable to assess  Insight:  unable to assess  Judgement:  Impaired  Impulse Control:  Impaired  Physical/Medical Issues:  Yes seizure disorder       Lab Results:   No visits with results within 1 Month(s) from this visit.   Latest known visit with results is:   Admission on 03/08/2020, Discharged on 03/08/2020   Component Date Value Ref Range Status   • Glucose 03/08/2020 106* 65 - 99 mg/dL Final   • BUN 03/08/2020 10  6 - 20 mg/dL Final   • Creatinine 03/08/2020 0.76  0.57 - 1.00 mg/dL Final   • Sodium 03/08/2020 141  136 - 145 mmol/L Final   • Potassium 03/08/2020 4.1  3.5 - 5.2 mmol/L Final   • Chloride 03/08/2020 103  98 - 107 mmol/L Final   • CO2 03/08/2020 25.6  22.0 - 29.0 mmol/L Final   • Calcium 03/08/2020 9.6  8.6 - 10.5 mg/dL Final   • Total Protein 03/08/2020 7.3  6.0 - 8.5 g/dL Final   • Albumin 03/08/2020 4.00  3.50 - 5.20 g/dL Final   • ALT (SGPT) 03/08/2020 16  1 - 33 U/L Final   • AST (SGOT) 03/08/2020 13  1 - 32 U/L Final   • Alkaline Phosphatase 03/08/2020 76  39 - 117 U/L Final   • Total Bilirubin 03/08/2020 0.4  0.2 - 1.2 mg/dL Final   • eGFR  African Amer 03/08/2020 118  >60 mL/min/1.73 Final   • Globulin 03/08/2020 3.3  gm/dL Final   • A/G Ratio 03/08/2020 1.2  g/dL Final   • BUN/Creatinine Ratio 03/08/2020 13.2  7.0 - 25.0 Final   • Anion Gap 03/08/2020 12.4  5.0 - 15.0 mmol/L Final   • Lipase 03/08/2020 12* 13 - 60 U/L Final   • Color,  03/08/2020 Yellow  Yellow, Straw Final   • Appearance,  03/08/2020 Clear  Clear Final    • pH, UA 03/08/2020 <=5.0  5.0 - 8.0 Final   • Specific Gravity, UA 03/08/2020 1.027  1.005 - 1.030 Final   • Glucose, UA 03/08/2020 Negative  Negative Final   • Ketones, UA 03/08/2020 Negative  Negative Final   • Bilirubin, UA 03/08/2020 Negative  Negative Final   • Blood, UA 03/08/2020 Trace* Negative Final   • Protein, UA 03/08/2020 Negative  Negative Final   • Leuk Esterase, UA 03/08/2020 Negative  Negative Final   • Nitrite, UA 03/08/2020 Negative  Negative Final   • Urobilinogen, UA 03/08/2020 0.2 E.U./dL  0.2 - 1.0 E.U./dL Final   • Extra Tube 03/08/2020 hold for add-on   Final    Auto resulted   • Extra Tube 03/08/2020 Hold for add-ons.   Final    Auto resulted.   • Extra Tube 03/08/2020 hold for add-on   Final    Auto resulted   • Extra Tube 03/08/2020 Hold for add-ons.   Final    Auto resulted.   • WBC 03/08/2020 5.49  3.40 - 10.80 10*3/mm3 Final   • RBC 03/08/2020 4.55  3.77 - 5.28 10*6/mm3 Final   • Hemoglobin 03/08/2020 12.7  12.0 - 15.9 g/dL Final   • Hematocrit 03/08/2020 38.5  34.0 - 46.6 % Final   • MCV 03/08/2020 84.6  79.0 - 97.0 fL Final   • MCH 03/08/2020 27.9  26.6 - 33.0 pg Final   • MCHC 03/08/2020 33.0  31.5 - 35.7 g/dL Final   • RDW 03/08/2020 12.5  12.3 - 15.4 % Final   • RDW-SD 03/08/2020 38.3  37.0 - 54.0 fl Final   • MPV 03/08/2020 11.7  6.0 - 12.0 fL Final   • Platelets 03/08/2020 226  140 - 450 10*3/mm3 Final   • Neutrophil % 03/08/2020 43.8  42.7 - 76.0 % Final   • Lymphocyte % 03/08/2020 43.7  19.6 - 45.3 % Final   • Monocyte % 03/08/2020 10.0  5.0 - 12.0 % Final   • Eosinophil % 03/08/2020 1.8  0.3 - 6.2 % Final   • Basophil % 03/08/2020 0.7  0.0 - 1.5 % Final   • Immature Grans % 03/08/2020 0.0  0.0 - 0.5 % Final   • Neutrophils, Absolute 03/08/2020 2.40  1.70 - 7.00 10*3/mm3 Final   • Lymphocytes, Absolute 03/08/2020 2.40  0.70 - 3.10 10*3/mm3 Final   • Monocytes, Absolute 03/08/2020 0.55  0.10 - 0.90 10*3/mm3 Final   • Eosinophils, Absolute 03/08/2020 0.10  0.00 - 0.40  10*3/mm3 Final   • Basophils, Absolute 03/08/2020 0.04  0.00 - 0.20 10*3/mm3 Final   • Immature Grans, Absolute 03/08/2020 0.00  0.00 - 0.05 10*3/mm3 Final   • nRBC 03/08/2020 0.0  0.0 - 0.2 /100 WBC Final   • RBC, UA 03/08/2020 0-2* None Seen /HPF Final   • WBC, UA 03/08/2020 0-2* None Seen /HPF Final   • Bacteria, UA 03/08/2020 Trace* None Seen /HPF Final   • Squamous Epithelial Cells, UA 03/08/2020 0-2  None Seen, 0-2 /HPF Final   • Hyaline Casts, UA 03/08/2020 None Seen  None Seen /LPF Final   • Mucus, UA 03/08/2020 Small/1+* None Seen, Trace /HPF Final   • Methodology 03/08/2020 Manual Light Microscopy   Final       Assessment/Plan   Diagnoses and all orders for this visit:    Autism spectrum disorder requiring very substantial support (level 3)  -     cloNIDine (Catapres) 0.3 MG tablet; Take 1 tablet by mouth Every Night.  -     diazePAM (VALIUM) 5 MG tablet; Take 1 tablet by mouth 3 (Three) Times a Day As Needed for anxiety  -     risperiDONE (RisperDAL) 2 MG tablet; Take 1 tablet by mouth 2 (Two) Times a Day.    Aggression  -     cloNIDine (Catapres) 0.3 MG tablet; Take 1 tablet by mouth Every Night.  -     diazePAM (VALIUM) 5 MG tablet; Take 1 tablet by mouth 3 (Three) Times a Day As Needed for anxiety  -     risperiDONE (RisperDAL) 2 MG tablet; Take 1 tablet by mouth 2 (Two) Times a Day.    Other specified anxiety disorders  -     cloNIDine (Catapres) 0.3 MG tablet; Take 1 tablet by mouth Every Night.  -     diazePAM (VALIUM) 5 MG tablet; Take 1 tablet by mouth 3 (Three) Times a Day As Needed for anxiety    -Patient was hospitalized since last visit due to pain with kidney stones.  After this has resolved, she has appeared to be more comfortable.  Medications were also adjusted and gabapentin was increased for better mood stability as well as pain management.  Clonazepam was changed to Valium for anxiety and agitation episodes.  She has not had any major issues since hospitalization and appears to be doing  much better.  -Reviewed previous documentation  -Reviewed most recent available labs  -DOUGLAS reviewed and appropriate. Patient counseled on use of controlled substances.   -Continue Risperdal 2 mg twice daily for autism spectrum disorder, behavioral disturbance, and anxiety  -Taking diazepam 5 mg p.o. 3 times daily as needed for anxiety.  Patient does not take this every day.  -Clonazepam has been discontinued  -Continue clonidine 0.3 mg p.o. nightly for insomnia, behaviors, and anxiety  -Continue Keppra 1000 mg twice daily for seizure disorder  -Gabapentin has been increased to 600 mg 3 times daily for mood and pain  -Approximate appointment time 11 AM to 11:30 AM via telephone visit due to technical difficulties with video visit    Visit Diagnoses:    ICD-10-CM ICD-9-CM   1. Autism spectrum disorder requiring very substantial support (level 3) F84.0 299.00   2. Aggression R46.89 V40.39   3. Other specified anxiety disorders F41.8 300.09       TREATMENT PLAN/GOALS: Continue supportive psychotherapy efforts and medications as indicated. Treatment and medication options discussed during today's visit. Patient acknowledged and verbally consented to continue with current treatment plan and was educated on the importance of compliance with treatment and follow-up appointments.    MEDICATION ISSUES:    Discussed medication options and treatment plan of prescribed medication as well as the risks, benefits, and side effects including potential falls, possible impaired driving and metabolic adversities among others. Patient is agreeable to call the office with any worsening of symptoms or onset of side effects. Patient is agreeable to call 911 or go to the nearest ER should he/she begin having SI/HI.     MEDS ORDERED DURING VISIT:  New Medications Ordered This Visit   Medications   • cloNIDine (Catapres) 0.3 MG tablet     Sig: Take 1 tablet by mouth Every Night.     Dispense:  30 tablet     Refill:  2   • diazePAM (VALIUM)  5 MG tablet     Sig: Take 1 tablet by mouth 3 (Three) Times a Day As Needed for anxiety     Dispense:  60 tablet     Refill:  0   • risperiDONE (RisperDAL) 2 MG tablet     Sig: Take 1 tablet by mouth 2 (Two) Times a Day.     Dispense:  60 tablet     Refill:  2       Return in about 3 months (around 7/24/2020).             This document has been electronically signed by Simon Kapoor MD  April 24, 2020 14:00

## 2020-07-24 ENCOUNTER — TELEMEDICINE (OUTPATIENT)
Dept: PSYCHIATRY | Facility: CLINIC | Age: 20
End: 2020-07-24

## 2020-07-24 VITALS
HEART RATE: 92 BPM | TEMPERATURE: 98.3 F | WEIGHT: 223 LBS | DIASTOLIC BLOOD PRESSURE: 70 MMHG | SYSTOLIC BLOOD PRESSURE: 130 MMHG | HEIGHT: 67 IN | RESPIRATION RATE: 18 BRPM | BODY MASS INDEX: 35 KG/M2

## 2020-07-24 DIAGNOSIS — F84.0 AUTISM SPECTRUM DISORDER REQUIRING VERY SUBSTANTIAL SUPPORT (LEVEL 3): ICD-10-CM

## 2020-07-24 DIAGNOSIS — R46.89 AGGRESSION: ICD-10-CM

## 2020-07-24 DIAGNOSIS — F41.8 OTHER SPECIFIED ANXIETY DISORDERS: ICD-10-CM

## 2020-07-24 PROCEDURE — 99214 OFFICE O/P EST MOD 30 MIN: CPT | Performed by: PSYCHIATRY & NEUROLOGY

## 2020-07-24 RX ORDER — RISPERIDONE 2 MG/1
2 TABLET ORAL 2 TIMES DAILY
Qty: 60 TABLET | Refills: 5 | Status: SHIPPED | OUTPATIENT
Start: 2020-07-24 | End: 2021-01-29 | Stop reason: SDUPTHER

## 2020-07-24 RX ORDER — DIAZEPAM 10 MG/1
10 TABLET ORAL 3 TIMES DAILY
Qty: 90 TABLET | Refills: 5 | Status: SHIPPED | OUTPATIENT
Start: 2020-07-24 | End: 2021-01-29 | Stop reason: SDUPTHER

## 2020-07-24 RX ORDER — CLONIDINE HYDROCHLORIDE 0.3 MG/1
0.3 TABLET ORAL NIGHTLY
Qty: 30 TABLET | Refills: 5 | Status: SHIPPED | OUTPATIENT
Start: 2020-07-24 | End: 2021-01-29 | Stop reason: SDUPTHER

## 2020-07-24 NOTE — PROGRESS NOTES
Subjective   Eboni Hernandez is a 20 y.o. female who presents today for follow up    This provider is located at [facility], [facility], [address] [place] [address], using [employer].  The patient is seen remotely at [facility], Behavioral health, [address], [address] [facility] in [facility] via [employer], an encrypted service from one [facility] Facility to another, with staff present. The patient's condition being diagnosed/treated is appropriate for telemedecine.  The provider identified himself as well as his credentials.      The patient and/or patient's guardian consent to be seen remotely, and when consent is given they understand that the consent allows for patient identifiable information to be sent to a third party as needed.  They may refuse to be seen remotely at any time.  The electronic data is encrypted and password protected, and the patient has been advised of the potential risks to privacy notwithstanding such measures.      Chief Complaint: Intellectual disability and autism spectrum disorder    History of Present Illness: Patient presents for follow-up for autism spectrum disorder.  Patient is doing relatively well. She did have to go back to the hospital for kidney stones and had stents placed due to recurrence. This has helped some of her agitation and behavioral issues as her discomfort is improved. She is not having any major issues at current and remains relatively stable and at baseline. She has upcoming dental appointments with  dentistry for dental care and has upcoming urology and neurology appointments. She is not having any major side effects of medications and is not having any sleep or appetite issues.   She denies SI/HI/AVH.    The following portions of the patient's history were reviewed and updated as appropriate: allergies, current medications, past family history, past medical history, past social history, past surgical history and problem  list.      Past Medical History:  Past Medical History:   Diagnosis Date   • Autism    • Chronic pelvic pain in female    • Communication deficit    • Encopresis    • Enuresis    • Global developmental delay    • History of kidney stones     6 WEEKS AGO   • Multiple developmental ovarian cysts    • Scoliosis    • Seizures (CMS/[facility]) 2018    LAST SEIZURE WAS ONE WEEK AGO-PETITE MAL.  LAST GRAND MAL SEIZURE WAS 4-5 MONTHS AGO       Social History:  Social History     Socioeconomic History   • Marital status: Single     Spouse name: Not on file   • Number of children: Not on file   • Years of education: Not on file   • Highest education level: Not on file   Tobacco Use   • Smoking status: Never Smoker   • Smokeless tobacco: Never Used   Substance and Sexual Activity   • Alcohol use: No   • Drug use: No   • Sexual activity: Defer       Family History:  Family History   Problem Relation Age of Onset   • Drug abuse Mother    • Anxiety disorder Mother    • Depression Mother    • Drug abuse Father    • ADD / ADHD Neg Hx    • Alcohol abuse Neg Hx    • Bipolar disorder Neg Hx    • Dementia Neg Hx    • OCD Neg Hx    • Paranoid behavior Neg Hx    • Schizophrenia Neg Hx    • Seizures Neg Hx    • Self-Injurious Behavior  Neg Hx    • Suicide Attempts Neg Hx        Past Surgical History:  Past Surgical History:   Procedure Laterality Date   • DIAGNOSTIC LAPAROSCOPY N/A 9/11/2018    Procedure: DIAGNOSTIC LAPAROSCOPY drainage of right ovarian cyst;  Surgeon: Amol Ospina MD;  Location: [facility];  Service: Obstetrics/Gynecology   • INTRAUTERINE DEVICE INSERTION     • OTHER SURGICAL HISTORY      IUD PLACEMENT IN OR   • URETEROSCOPY LASER LITHOTRIPSY WITH STENT INSERTION  2018   • URETEROSCOPY STENT INSERTION  2020       Problem List:  Patient Active Problem List   Diagnosis   • Problems with communication   • Intellectual disability, profound   • Encopresis   • Enuresis   • Grand mal epilepsy, controlled (CMS/[facility])   •  Aggression   • Insomnia due to other mental disorder   • Autism spectrum disorder   • Cyst of ovary   • Pelvic pain   • Nephrolithiasis   • Nexplanon in place       Allergy:   No Known Allergies     Current Medications:   Current Outpatient Medications   Medication Sig Dispense Refill   • cloNIDine (Catapres) 0.3 MG tablet Take 1 tablet by mouth Every Night. 30 tablet 5   • diazePAM (VALIUM) 10 MG tablet Take 1 tablet by mouth 3 (Three) Times a Day as needed for anxiety 90 tablet 5   • gabapentin (NEURONTIN) 600 MG tablet Take 1 tablet by mouth 3 (Three) Times a Day as needed 90 tablet 3   • ibuprofen (ADVIL,MOTRIN) 800 MG tablet Take 1 tablet by mouth Every 8 (Eight) Hours As Needed for Mild Pain. 30 tablet 0   • levETIRAcetam (KEPPRA) 1000 MG tablet Take 1 tablet by mouth 2 (Two) Times a Day. 60 tablet 7   • levETIRAcetam (KEPPRA) 1000 MG tablet Take 1 tablet by mouth 2 (Two) Times a Day. 60 tablet 7   • oxyCODONE-acetaminophen (PERCOCET)  MG per tablet Take 1 tablet by mouth Every 8 Hours As Needed for pain. Max of 3 tablets per day 30 tablet 0   • risperiDONE (RisperDAL) 2 MG tablet Take 1 tablet by mouth 2 (Two) Times a Day. 60 tablet 5     No current facility-administered medications for this visit.        Review of Symptoms:    Review of Systems   Constitutional: Negative for activity change.   HENT: Negative.    Eyes: Negative.    Respiratory: Negative.    Cardiovascular: Negative.    Gastrointestinal: Negative.    Endocrine: Negative.    Genitourinary: Negative.    Musculoskeletal: Negative.    Allergic/Immunologic: Negative.    Neurological: Positive for seizures and speech difficulty (nonverbal).   Hematological: Negative.    Psychiatric/Behavioral: Positive for agitation, behavioral problems, sleep disturbance and positive for hyperactivity. The patient is not nervous/anxious.          Physical Exam:   Blood pressure 130/70, pulse 92, temperature 98.3 °F (36.8 °C), temperature source Temporal,  "resp. rate 18, height 170.2 cm (67\"), weight 101 kg (223 lb), not currently breastfeeding.     Appearance: -American female of stated age, in no acute distress  Gait, Station, Strength: Within normal limits    Mental Status Exam:   Hygiene:   good  Cooperation:  noncooperative, apathetic, does not engage  Eye Contact:  Poor  Psychomotor Behavior:  Appropriate  Affect:  Blunted  Mood: normal  Speech:  Mute and lack of meaningful speech, makes noises  Thought Process:  Unable to demonstrate  Thought Content:  Unable to demonstrate  Suicidal:  unable to assess  Homicidal:  unable to assess  Hallucinations:  unable to assess  Delusion:  Unable to demonstrate  Memory:  Unable to evaluate  Orientation:  Unable to evaluate  Reliability:  unable to assess  Insight:  unable to assess  Judgement:  Impaired  Impulse Control:  Impaired  Physical/Medical Issues:  Yes seizure disorder       Lab Results:   No visits with results within 1 Month(s) from this visit.   Latest known visit with results is:   Admission on 03/08/2020, Discharged on 03/08/2020   Component Date Value Ref Range Status   • Glucose 03/08/2020 106* 65 - 99 mg/dL Final   • BUN 03/08/2020 10  6 - 20 mg/dL Final   • Creatinine 03/08/2020 0.76  0.57 - 1.00 mg/dL Final   • Sodium 03/08/2020 141  136 - 145 mmol/L Final   • Potassium 03/08/2020 4.1  3.5 - 5.2 mmol/L Final   • Chloride 03/08/2020 103  98 - 107 mmol/L Final   • CO2 03/08/2020 25.6  22.0 - 29.0 mmol/L Final   • Calcium 03/08/2020 9.6  8.6 - 10.5 mg/dL Final   • Total Protein 03/08/2020 7.3  6.0 - 8.5 g/dL Final   • Albumin 03/08/2020 4.00  3.50 - 5.20 g/dL Final   • ALT (SGPT) 03/08/2020 16  1 - 33 U/L Final   • AST (SGOT) 03/08/2020 13  1 - 32 U/L Final   • Alkaline Phosphatase 03/08/2020 76  39 - 117 U/L Final   • Total Bilirubin 03/08/2020 0.4  0.2 - 1.2 mg/dL Final   • eGFR  African Amer 03/08/2020 118  >60 mL/min/1.73 Final   • Globulin 03/08/2020 3.3  gm/dL Final   • A/G Ratio 03/08/2020 1.2 "  g/dL Final   • BUN/Creatinine Ratio 03/08/2020 13.2  7.0 - 25.0 Final   • Anion Gap 03/08/2020 12.4  5.0 - 15.0 mmol/L Final   • Lipase 03/08/2020 12* 13 - 60 U/L Final   • Color, UA 03/08/2020 Yellow  Yellow, Straw Final   • Appearance, UA 03/08/2020 Clear  Clear Final   • pH, UA 03/08/2020 <=5.0  5.0 - 8.0 Final   • Specific Gravity, UA 03/08/2020 1.027  1.005 - 1.030 Final   • Glucose, UA 03/08/2020 Negative  Negative Final   • Ketones, UA 03/08/2020 Negative  Negative Final   • Bilirubin, UA 03/08/2020 Negative  Negative Final   • Blood, UA 03/08/2020 Trace* Negative Final   • Protein, UA 03/08/2020 Negative  Negative Final   • Leuk Esterase, UA 03/08/2020 Negative  Negative Final   • Nitrite, UA 03/08/2020 Negative  Negative Final   • Urobilinogen, UA 03/08/2020 0.2 E.U./dL  0.2 - 1.0 E.U./dL Final   • Extra Tube 03/08/2020 hold for add-on   Final    Auto resulted   • Extra Tube 03/08/2020 Hold for add-ons.   Final    Auto resulted.   • Extra Tube 03/08/2020 hold for add-on   Final    Auto resulted   • Extra Tube 03/08/2020 Hold for add-ons.   Final    Auto resulted.   • WBC 03/08/2020 5.49  3.40 - 10.80 10*3/mm3 Final   • RBC 03/08/2020 4.55  3.77 - 5.28 10*6/mm3 Final   • Hemoglobin 03/08/2020 12.7  12.0 - 15.9 g/dL Final   • Hematocrit 03/08/2020 38.5  34.0 - 46.6 % Final   • MCV 03/08/2020 84.6  79.0 - 97.0 fL Final   • MCH 03/08/2020 27.9  26.6 - 33.0 pg Final   • MCHC 03/08/2020 33.0  31.5 - 35.7 g/dL Final   • RDW 03/08/2020 12.5  12.3 - 15.4 % Final   • RDW-SD 03/08/2020 38.3  37.0 - 54.0 fl Final   • MPV 03/08/2020 11.7  6.0 - 12.0 fL Final   • Platelets 03/08/2020 226  140 - 450 10*3/mm3 Final   • Neutrophil % 03/08/2020 43.8  42.7 - 76.0 % Final   • Lymphocyte % 03/08/2020 43.7  19.6 - 45.3 % Final   • Monocyte % 03/08/2020 10.0  5.0 - 12.0 % Final   • Eosinophil % 03/08/2020 1.8  0.3 - 6.2 % Final   • Basophil % 03/08/2020 0.7  0.0 - 1.5 % Final   • Immature Grans % 03/08/2020 0.0  0.0 - 0.5 %  Final   • Neutrophils, Absolute 03/08/2020 2.40  1.70 - 7.00 10*3/mm3 Final   • Lymphocytes, Absolute 03/08/2020 2.40  0.70 - 3.10 10*3/mm3 Final   • Monocytes, Absolute 03/08/2020 0.55  0.10 - 0.90 10*3/mm3 Final   • Eosinophils, Absolute 03/08/2020 0.10  0.00 - 0.40 10*3/mm3 Final   • Basophils, Absolute 03/08/2020 0.04  0.00 - 0.20 10*3/mm3 Final   • Immature Grans, Absolute 03/08/2020 0.00  0.00 - 0.05 10*3/mm3 Final   • nRBC 03/08/2020 0.0  0.0 - 0.2 /100 WBC Final   • RBC, UA 03/08/2020 0-2* None Seen /HPF Final   • WBC, UA 03/08/2020 0-2* None Seen /HPF Final   • Bacteria, UA 03/08/2020 Trace* None Seen /HPF Final   • Squamous Epithelial Cells, UA 03/08/2020 0-2  None Seen, 0-2 /HPF Final   • Hyaline Casts, UA 03/08/2020 None Seen  None Seen /LPF Final   • Mucus, UA 03/08/2020 Small/1+* None Seen, Trace /HPF Final   • Methodology 03/08/2020 Manual Light Microscopy   Final       Assessment/Plan   Diagnoses and all orders for this visit:    Autism spectrum disorder requiring very substantial support (level 3)  -     risperiDONE (RisperDAL) 2 MG tablet; Take 1 tablet by mouth 2 (Two) Times a Day.  -     cloNIDine (Catapres) 0.3 MG tablet; Take 1 tablet by mouth Every Night.  -     diazePAM (VALIUM) 10 MG tablet; Take 1 tablet by mouth 3 (Three) Times a Day as needed for anxiety    Aggression  -     risperiDONE (RisperDAL) 2 MG tablet; Take 1 tablet by mouth 2 (Two) Times a Day.  -     cloNIDine (Catapres) 0.3 MG tablet; Take 1 tablet by mouth Every Night.  -     diazePAM (VALIUM) 10 MG tablet; Take 1 tablet by mouth 3 (Three) Times a Day as needed for anxiety    Other specified anxiety disorders  -     cloNIDine (Catapres) 0.3 MG tablet; Take 1 tablet by mouth Every Night.  -     diazePAM (VALIUM) 10 MG tablet; Take 1 tablet by mouth 3 (Three) Times a Day as needed for anxiety    -Patient stable and at baseline without any major issues.   -Reviewed previous documentation  -Reviewed most recent available  labs  -DOUGLAS reviewed and appropriate. Patient counseled on use of controlled substances.   -Continue Risperdal 2 mg twice daily for autism spectrum disorder, behavioral disturbance, and anxiety  -Continue diazepam 5-10 mg p.o. 3 times daily as needed for anxiety.  Patient does not take this every day.  -Clonazepam has been discontinued  -Continue clonidine 0.3 mg p.o. nightly for insomnia, behaviors, and anxiety  -Continue Keppra 1000 mg twice daily for seizure disorder  -Continue Gabapentin 600 mg 3 times daily for mood and pain      Visit Diagnoses:    ICD-10-CM ICD-9-CM   1. Autism spectrum disorder requiring very substantial support (level 3) F84.0 299.00   2. Aggression R46.89 V40.39   3. Other specified anxiety disorders F41.8 300.09       TREATMENT PLAN/GOALS: Continue supportive psychotherapy efforts and medications as indicated. Treatment and medication options discussed during today's visit. Patient acknowledged and verbally consented to continue with current treatment plan and was educated on the importance of compliance with treatment and follow-up appointments.    MEDICATION ISSUES:    Discussed medication options and treatment plan of prescribed medication as well as the risks, benefits, and side effects including potential falls, possible impaired driving and metabolic adversities among others. Patient is agreeable to call the office with any worsening of symptoms or onset of side effects. Patient is agreeable to call 911 or go to the nearest ER should he/she begin having SI/HI.     MEDS ORDERED DURING VISIT:  New Medications Ordered This Visit   Medications   • risperiDONE (RisperDAL) 2 MG tablet     Sig: Take 1 tablet by mouth 2 (Two) Times a Day.     Dispense:  60 tablet     Refill:  5   • cloNIDine (Catapres) 0.3 MG tablet     Sig: Take 1 tablet by mouth Every Night.     Dispense:  30 tablet     Refill:  5   • diazePAM (VALIUM) 10 MG tablet     Sig: Take 1 tablet by mouth 3 (Three) Times a Day  as needed for anxiety     Dispense:  90 tablet     Refill:  5       Return in about 6 months (around 1/24/2021).             This document has been electronically signed by Simon Kapoor MD  July 24, 2020 13:59

## 2020-09-02 ENCOUNTER — OFFICE VISIT (OUTPATIENT)
Dept: NEUROLOGY | Age: 20
End: 2020-09-02

## 2020-09-02 VITALS
HEIGHT: 68 IN | BODY MASS INDEX: 34.56 KG/M2 | HEART RATE: 95 BPM | TEMPERATURE: 97.8 F | OXYGEN SATURATION: 98 % | WEIGHT: 228 LBS

## 2020-09-02 DIAGNOSIS — F84.0 AUTISM SPECTRUM DISORDER: ICD-10-CM

## 2020-09-02 DIAGNOSIS — G40.909 SEIZURE DISORDER (HCC): Primary | ICD-10-CM

## 2020-09-02 PROBLEM — G40.409 GRAND MAL EPILEPSY, CONTROLLED (HCC): Status: RESOLVED | Noted: 2018-08-29 | Resolved: 2020-09-02

## 2020-09-02 PROCEDURE — 99204 OFFICE O/P NEW MOD 45 MIN: CPT | Performed by: PSYCHIATRY & NEUROLOGY

## 2020-09-02 RX ORDER — OXCARBAZEPINE 300 MG/1
300 TABLET, FILM COATED ORAL 2 TIMES DAILY
Qty: 60 TABLET | Refills: 12 | Status: SHIPPED | OUTPATIENT
Start: 2020-09-02 | End: 2020-10-14

## 2020-09-02 NOTE — PROGRESS NOTES
Subjective:     Patient ID: Eboni Hernandez is a 20 y.o. female.    CC:   Chief Complaint   Patient presents with   • Consult     Patient in office to follow up on seizures. Patient's last seizure was August 24th.        HPI:   History of Present Illness  The following portions of the patient's history were reviewed and updated as appropriate: allergies, current medications, past family history, past medical history, past social history, past surgical history and problem list.     21 y/o female with severe autism and seizure disorder is here with her aunt who adopted her at age 2. She was diagnosed with autism and seizures shortly after, around age 3. Her seizures have been generalized tonic-clonic, occasionally partial with eye batting and loss of motor control, last GTC seizure in July, last partial seizure 2 weeks ago. She has been on Keppra since childhood, also takes risperidone, gabapentin and diazepam as mood stabilizers prescribed by psychiatry. EEG was attempted with sedation at [facility] about 4 years ago but was nondiagnostic 22 oversedation. She would not sit still or cooperate for a routine EEG. Patient went thru school. She is nonverbal, never potty trained, able to dress and feed herself. She is very irritable per her aunt. She has a h/o kidney stones.      Past Medical History:   Diagnosis Date   • Anxiety    • Anxiety    • Autism    • Chronic pelvic pain in female    • Communication deficit    • Developmental delay    • Encopresis    • Enuresis    • Global developmental delay    • History of kidney stones     6 WEEKS AGO   • Kidney stones    • Multiple developmental ovarian cysts    • Scoliosis    • Seizures (CMS/HCC) 2018    LAST SEIZURE WAS ONE WEEK AGO-PETITE MAL.  LAST GRAND MAL SEIZURE WAS 4-5 MONTHS AGO   • Seizures (CMS/HCC)        Past Surgical History:   Procedure Laterality Date   • DIAGNOSTIC LAPAROSCOPY N/A 9/11/2018    Procedure: DIAGNOSTIC LAPAROSCOPY drainage of right ovarian cyst;   "Surgeon: Amol Ospina MD;  Location: [facility];  Service: Obstetrics/Gynecology   • INTRAUTERINE DEVICE INSERTION     • OTHER SURGICAL HISTORY      IUD PLACEMENT IN OR   • URETEROSCOPY LASER LITHOTRIPSY WITH STENT INSERTION  2018   • URETEROSCOPY STENT INSERTION  2020       Social History     Socioeconomic History   • Marital status: Single     Spouse name: Not on file   • Number of children: Not on file   • Years of education: Not on file   • Highest education level: Not on file   Tobacco Use   • Smoking status: Never Smoker   • Smokeless tobacco: Never Used   Substance and Sexual Activity   • Alcohol use: No   • Drug use: No   • Sexual activity: Defer       Family History   Problem Relation Age of Onset   • Drug abuse Mother    • Anxiety disorder Mother    • Depression Mother    • Drug abuse Father    • ADD / ADHD Neg Hx    • Alcohol abuse Neg Hx    • Bipolar disorder Neg Hx    • Dementia Neg Hx    • OCD Neg Hx    • Paranoid behavior Neg Hx    • Schizophrenia Neg Hx    • Seizures Neg Hx    • Self-Injurious Behavior  Neg Hx    • Suicide Attempts Neg Hx         Review of Systems   Neurological: Positive for seizures.        Objective:  Pulse 95   Temp 97.8 °F (36.6 °C)   Ht 172.7 cm (68\")   Wt 103 kg (228 lb)   SpO2 98%   BMI 34.67 kg/m²     Neurologic Exam     Cranial Nerves     CN III, IV, VI   Pupils are equal, round, and reactive to light.  Extraocular motions are normal.       Physical Exam   Constitutional: She appears well-developed and well-nourished.   HENT:   Head: Normocephalic and atraumatic.   Eyes: Pupils are equal, round, and reactive to light. EOM are normal.   Cardiovascular: Normal rate and regular rhythm.   Pulmonary/Chest: Effort normal.   Neurological: She is alert.   Makes eye contact, coos to herself, does not speak or follow commands, no facial droop, moves limbs and walks spontaneously.       Assessment/Plan:       Eboni was seen today for consult.    Diagnoses and all orders " for this visit:    Seizure disorder (CMS/HCC), partial onset with generalization.  -     OXcarbazepine (TRILEPTAL) 300 MG tablet; Take 1 tablet by mouth 2 (Two) Times a Day. 1/2 bid for one week then one bid    Autism spectrum disorder  -     OXcarbazepine (TRILEPTAL) 300 MG tablet; Take 1 tablet by mouth 2 (Two) Times a Day. 1/2 bid for one week then one bid    We discussed that Keppra can cause irritability. If she tolerates Trileptal we could try to taper off Keppra. The aunt is to call for problems.         Reviewed medications, potential side effects and signs and symptoms to report. Discussed risk versus benefits of treatment plan with patient and/or family-including medications, labs and radiology that may be ordered. Addressed questions and concerns during visit. Patient and/or family verbalized understanding and agree with plan.    AS THE PROVIDER, I PERSONALLY WORE PPE DURING ENTIRE FACE TO FACE ENCOUNTER IN CLINIC WITH THE PATIENT. PATIENT ALSO WORE PPE DURING ENTIRE FACE TO FACE ENCOUNTER EXCEPT FOR A MAX OF 30 SECONDS DURING NEUROLOGICAL EVALUATION OF CRANIAL NERVES AND THEN MASK WAS PLACED BACK OVER PATIENT FACE FOR REMAINDER OF VISIT. I WASHED MY HANDS BEFORE AND AFTER VISIT.    Varinder Cano MD  9/2/2020

## 2020-09-08 RX ORDER — LEVETIRACETAM 1000 MG/1
1000 TABLET ORAL 2 TIMES DAILY
Qty: 60 TABLET | Refills: 5 | Status: SHIPPED | OUTPATIENT
Start: 2020-09-08 | End: 2020-10-14

## 2020-10-14 ENCOUNTER — TRANSCRIBE ORDERS (OUTPATIENT)
Dept: GENERAL RADIOLOGY | Facility: HOSPITAL | Age: 20
End: 2020-10-14

## 2020-10-14 ENCOUNTER — HOSPITAL ENCOUNTER (OUTPATIENT)
Dept: GENERAL RADIOLOGY | Facility: HOSPITAL | Age: 20
Discharge: HOME | End: 2020-10-14
Admitting: UROLOGY

## 2020-10-14 ENCOUNTER — OFFICE VISIT (OUTPATIENT)
Dept: NEUROLOGY | Facility: CLINIC | Age: 20
End: 2020-10-14

## 2020-10-14 VITALS
DIASTOLIC BLOOD PRESSURE: 70 MMHG | BODY MASS INDEX: 36.03 KG/M2 | SYSTOLIC BLOOD PRESSURE: 100 MMHG | OXYGEN SATURATION: 97 % | TEMPERATURE: 97.8 F | HEIGHT: 67 IN | WEIGHT: 229.6 LBS | HEART RATE: 117 BPM

## 2020-10-14 DIAGNOSIS — N20.0 KIDNEY STONE: ICD-10-CM

## 2020-10-14 DIAGNOSIS — F84.0 AUTISM SPECTRUM DISORDER: ICD-10-CM

## 2020-10-14 DIAGNOSIS — N20.0 KIDNEY STONE: Primary | ICD-10-CM

## 2020-10-14 DIAGNOSIS — G40.909 SEIZURE DISORDER (HCC): Primary | ICD-10-CM

## 2020-10-14 DIAGNOSIS — F79 INTELLECTUAL DISABILITY: ICD-10-CM

## 2020-10-14 PROCEDURE — 99213 OFFICE O/P EST LOW 20 MIN: CPT | Performed by: NURSE PRACTITIONER

## 2020-10-14 PROCEDURE — 74018 RADEX ABDOMEN 1 VIEW: CPT

## 2020-10-14 RX ORDER — OXCARBAZEPINE 300 MG/1
300 TABLET, FILM COATED ORAL 2 TIMES DAILY
Qty: 180 TABLET | Refills: 3 | Status: SHIPPED | OUTPATIENT
Start: 2020-10-14 | End: 2020-12-17

## 2020-10-14 NOTE — PATIENT INSTRUCTIONS
Epilepsy  Epilepsy is when a person keeps having seizures. A seizure is a burst of abnormal activity in the brain. A seizure can change how you think or behave, and it can make it hard to be aware of what is happening.  This condition can cause problems such as:  · Falls, accidents, and injury.  · Sadness (depression).  · Poor memory.  · Sudden unexplained death in epilepsy (SUDEP). This is rare. Its cause is not known.  Most people with epilepsy lead normal lives.  What are the causes?  This condition may be caused by:  · A head injury.  · An injury that happens at birth.  · A high fever during childhood.  · A stroke.  · Bleeding that goes into or around the brain.  · Certain medicines and drugs.  · Having too little oxygen for a long period of time.  · Abnormal brain development.  · Certain infections.  · Brain tumors.  · Conditions that are passed from parent to child (are hereditary).  What are the signs or symptoms?  Symptoms of a seizure vary from person to person. They may include:  · Jerky movements of muscles (convulsions).  · Stiffening of the body.  · Movements of the arms or legs that you are not able to control.  · Passing out (loss of consciousness).  · Breathing problems.  · Sudden falls.  · Confusion.  · Head nodding.  · Eye blinking or twitching.  · Lip smacking.  · Drooling.  · Fast eye movements.  · Grunting.  · Not being able to control when you pee or poop.  · Staring.  · Being hard to wake up (unresponsiveness).  Some people have symptoms right before a seizure happens (aura) and right after a seizure happens. These symptoms include:  · Fear or anxiety.  · Feeling sick to your stomach (nauseous).  · Feeling like the room is spinning (vertigo).  · A feeling of having seen or heard something before (déjà vu).  · Odd tastes or smells.  · Changes in how you see (vision), such as seeing flashing lights or spots.  Symptoms that follow a seizure include:  · Being confused.  · Being sleepy.  · Having a  headache.  How is this treated?  Treatment can control seizures. Treatment for this condition may involve:  · Taking medicines to control seizures.  · Having a device (vagus nerve stimulator) put in the chest. The device sends signals to a nerve and to the brain to prevent seizures.  · Brain surgery to stop seizures from happening or to reduce how often they happen.  · Having blood tests often to make sure you are getting the right amount of medicine.  Once this condition has been diagnosed, it is important to start treatment as soon as possible. For some people, epilepsy goes away in time. Others will need treatment for the rest of their life.  Follow these instructions at home:  Medicines  · Take over-the-counter and prescription medicines only as told by your doctor.  · Avoid anything that may keep your medicine from working, such as alcohol.  Activity  · Get enough rest. Lack of sleep can make seizures more likely to occur.  · Follow your doctor's advice about driving, swimming, and doing anything else that would be dangerous if you had a seizure.  ? If you live in the [place] check with your local DMV (department of POI vehicles) to find out about local driving laws. Each state has rules about when you can return to driving.  Teaching others  Teach friends and family what to do if you have a seizure. They should:  · Lay you on the ground to prevent a fall.  · Cushion your head and body.  · Loosen any tight clothing around your neck.  · Turn you on your side.  · Stay with you until you are better.  · Not hold you down.  · Not put anything in your mouth.  · Know whether or not you need emergency care.    General instructions  · Avoid anything that causes you to have seizures.  · Keep a seizure diary. Write down what you remember about each seizure. Be sure to include what might have caused it.  · Keep all follow-up visits as told by your doctor. This is important.  Contact a doctor if:  · You have a change in how  often or when you have seizures.  · You get an infection or start to feel sick. You may have more seizures when you are sick.  Get help right away if:  · A seizure does not stop after 5 minutes.  · You have more than one seizure in a row, and you do not have enough time between the seizures to feel better.  · A seizure makes it harder to breathe.  · A seizure is different from other seizures you have had.  · A seizure makes you unable to speak or use a part of your body.  · You did not wake up right after a seizure.  These symptoms may be an emergency. Do not wait to see if the symptoms will go away. Get medical help right away. Call your local emergency services (911 in the [place] Do not drive yourself to the hospital.  Summary  · Epilepsy is when a person keeps having seizures. A seizure is a burst of abnormal activity in the brain.  · Treatment can control seizures.  · Teach friends and family what to do if you have a seizure.  This information is not intended to replace advice given to you by your health care provider. Make sure you discuss any questions you have with your health care provider.  Document Released: 10/15/2010 Document Revised: 08/12/2019 Document Reviewed: 08/12/2019  Elsevier Patient Education © 2020 [employer]

## 2020-10-14 NOTE — PROGRESS NOTES
Seizure Office Visit Note     Patient Name: bEoni Hernandez  : 2000   MRN: [id number]     Referring Physician: No ref. provider found    Chief Complaint:    Chief Complaint   Patient presents with   • Follow-up     patient in office to follow up on seizures. Patients mother states patient is doing well on the trileptal.       History of Present Illness: Eboni Hernandez is a 20 y.o. female who is here today to follow up for seizures.  She is accompanied by her aunt, Elizabeth, who is also her caregiver.  The patient is taking Oxcarbazepine 300mg BID and tolerating that well.  She has had no seizures.  Her last seizure was 2020.  She is still taking the Keppra and her aunt is agreeable to weaning off of that to see if it helps with her irritability.      Following taken from previous visit note: 21 y/o female with severe autism and seizure disorder is here with her aunt who adopted her at age 2. She was diagnosed with autism and seizures shortly after, around age 3. Her seizures have been generalized tonic-clonic, occasionally partial with eye batting and loss of motor control, last GTC seizure in July, last partial seizure 2 weeks ago. She has been on Keppra since childhood, also takes risperidone, gabapentin and diazepam as mood stabilizers prescribed by psychiatry. EEG was attempted with sedation at [facility] about 4 years ago but was nondiagnostic 22 oversedation. She would not sit still or cooperate for a routine EEG. Patient went thru school. She is nonverbal, never potty trained, able to dress and feed herself. She is very irritable per her aunt. She has a h/o kidney stones.      Subjective      Review of Systems:   Review of Systems   Constitutional: Negative for chills, fatigue, fever, unexpected weight gain and unexpected weight loss.   HENT: Negative for facial swelling, hearing loss, sore throat, swollen glands, tinnitus and trouble swallowing.    Eyes: Negative for blurred vision,  double vision, photophobia and visual disturbance.   Respiratory: Negative for cough, chest tightness and shortness of breath.    Cardiovascular: Negative for chest pain, palpitations and leg swelling.   Gastrointestinal: Negative for constipation, diarrhea and nausea.   Endocrine: Negative for cold intolerance and heat intolerance.   Musculoskeletal: Negative for gait problem, neck pain and neck stiffness.   Skin: Negative for color change and rash.   Allergic/Immunologic: Negative for environmental allergies and food allergies.   Neurological: Positive for seizures. Negative for dizziness, syncope, facial asymmetry, speech difficulty, weakness, light-headedness, numbness, headache, memory problem and confusion.   Psychiatric/Behavioral: Negative for agitation, behavioral problems, sleep disturbance and depressed mood. The patient is not nervous/anxious.        Past Medical History:   Past Medical History:   Diagnosis Date   • Anxiety    • Anxiety    • Autism    • Chronic pelvic pain in female    • Communication deficit    • Developmental delay    • Encopresis    • Enuresis    • Global developmental delay    • History of kidney stones     6 WEEKS AGO   • Kidney stones    • Multiple developmental ovarian cysts    • Scoliosis    • Seizures (CMS/HCC) 2018    LAST SEIZURE WAS ONE WEEK AGO-PETITE MAL.  LAST GRAND MAL SEIZURE WAS 4-5 MONTHS AGO   • Seizures (CMS/HCC)        Past Surgical History:   Past Surgical History:   Procedure Laterality Date   • DIAGNOSTIC LAPAROSCOPY N/A 9/11/2018    Procedure: DIAGNOSTIC LAPAROSCOPY drainage of right ovarian cyst;  Surgeon: Amol Ospina MD;  Location: [facility];  Service: Obstetrics/Gynecology   • INTRAUTERINE DEVICE INSERTION     • OTHER SURGICAL HISTORY      IUD PLACEMENT IN OR   • URETEROSCOPY LASER LITHOTRIPSY WITH STENT INSERTION  2018   • URETEROSCOPY STENT INSERTION  2020       Family History:   Family History   Problem Relation Age of Onset   • Drug abuse Mother   "  • Anxiety disorder Mother    • Depression Mother    • Drug abuse Father    • ADD / ADHD Neg Hx    • Alcohol abuse Neg Hx    • Bipolar disorder Neg Hx    • Dementia Neg Hx    • OCD Neg Hx    • Paranoid behavior Neg Hx    • Schizophrenia Neg Hx    • Seizures Neg Hx    • Self-Injurious Behavior  Neg Hx    • Suicide Attempts Neg Hx        Social History:   Social History     Socioeconomic History   • Marital status: Single     Spouse name: Not on file   • Number of children: Not on file   • Years of education: Not on file   • Highest education level: Not on file   Tobacco Use   • Smoking status: Never Smoker   • Smokeless tobacco: Never Used   Substance and Sexual Activity   • Alcohol use: No   • Drug use: No   • Sexual activity: Defer       Medications:     Current Outpatient Medications:   •  cloNIDine (Catapres) 0.3 MG tablet, Take 1 tablet by mouth Every Night., Disp: 30 tablet, Rfl: 5  •  diazePAM (VALIUM) 10 MG tablet, Take 1 tablet by mouth 3 (Three) Times a Day as needed for anxiety, Disp: 90 tablet, Rfl: 5  •  gabapentin (NEURONTIN) 600 MG tablet, Take 1 tablet by mouth 3 (Three) Times a Day as needed, Disp: 90 tablet, Rfl: 3  •  ibuprofen (ADVIL,MOTRIN) 800 MG tablet, Take 1 tablet by mouth Every 8 (Eight) Hours As Needed for Mild Pain., Disp: 30 tablet, Rfl: 0  •  levETIRAcetam (KEPPRA) 1000 MG tablet, Take 1 tablet by mouth 2 (Two) Times a Day., Disp: 60 tablet, Rfl: 7  •  OXcarbazepine (TRILEPTAL) 300 MG tablet, Take 1/2 tablet by mouth 2 (Two) Times A Day for 1 week, then increase to 1 tablet 2 (Two) Times A Day, Disp: 60 tablet, Rfl: 12  •  risperiDONE (RisperDAL) 2 MG tablet, Take 1 tablet by mouth 2 (Two) Times a Day., Disp: 60 tablet, Rfl: 5    Allergies:   No Known Allergies    Objective     Physical Exam:  Vital Signs:   Vitals:    10/14/20 1021   BP: 100/70   Patient Position: Sitting   Pulse: 117   Temp: 97.8 °F (36.6 °C)   SpO2: 97%   Weight: 104 kg (229 lb 9.6 oz)   Height: 170.2 cm (67\") "   PainSc: 0-No pain     Body mass index is 35.96 kg/m².     Physical Exam  Vitals signs and nursing note reviewed.   Constitutional:       General: She is not in acute distress.     Appearance: She is well-developed. She is obese. She is not diaphoretic.   HENT:      Head: Normocephalic and atraumatic.   Eyes:      Conjunctiva/sclera: Conjunctivae normal.      Pupils: Pupils are equal, round, and reactive to light.   Neck:      Musculoskeletal: Neck supple.   Cardiovascular:      Rate and Rhythm: Regular rhythm. Tachycardia present.      Heart sounds: Normal heart sounds. No murmur. No friction rub. No gallop.    Pulmonary:      Effort: Pulmonary effort is normal. No respiratory distress.      Breath sounds: Normal breath sounds. No wheezing or rales.   Skin:     General: Skin is warm and dry.      Findings: No rash.   Neurological:      Mental Status: She is alert. Mental status is at baseline.      Comments: Makes sounds but no words. Does not follow instructions. No facial droop. Moves all limbs and walks spontaneously.          Neurologic Exam     Cranial Nerves     CN III, IV, VI   Pupils are equal, round, and reactive to light.      Assessment / Plan      Assessment/Plan:   Diagnoses and all orders for this visit:    1. Seizure disorder (CMS/[facility]) (Primary)  - Wean off of Keppra as follows- 1000mg daily x 1 week, then stop the medication. Notify provider immediately if patient has any seizures.   - Continue Oxcarbazepine 300mg PO BID.   - Seizure precautions discussed and in place.     2. Intellectual disability    3. BMI 36.0-36.9,adult       Follow Up:   Return in about 2 months (around 12/14/2020) for Recheck.    FRANCISCO Freedman, FNP-C  [facility]       Please note that portions of this note may have been completed with a voice recognition program. Efforts were made to edit the dictations, but occasionally words are mistranscribed.

## 2020-10-23 ENCOUNTER — TRANSCRIBE ORDERS (OUTPATIENT)
Dept: ADMINISTRATIVE | Facility: HOSPITAL | Age: 20
End: 2020-10-23

## 2020-10-23 DIAGNOSIS — N20.0 URIC ACID NEPHROLITHIASIS: Primary | ICD-10-CM

## 2020-10-29 ENCOUNTER — HOSPITAL ENCOUNTER (OUTPATIENT)
Dept: CT IMAGING | Facility: HOSPITAL | Age: 20
Discharge: HOME | End: 2020-10-29
Admitting: UROLOGY

## 2020-10-29 DIAGNOSIS — N20.0 URIC ACID NEPHROLITHIASIS: ICD-10-CM

## 2020-10-29 PROCEDURE — 74176 CT ABD & PELVIS W/O CONTRAST: CPT

## 2020-12-08 ENCOUNTER — TELEPHONE (OUTPATIENT)
Dept: NEUROLOGY | Facility: CLINIC | Age: 20
End: 2020-12-08

## 2020-12-08 NOTE — TELEPHONE ENCOUNTER
I would call the patient's aunt and let her know I am aware. If she has a seizure that lasts more than 5 minutes, they need to call 911. Also, if she has more frequent seizures please call and let me know. Thank you.

## 2020-12-08 NOTE — TELEPHONE ENCOUNTER
Provider:JOHN FERNANDEZ  Caller: KEYUR   Relationship to Patient: AUNT    Phone Number: [phone]  Reason for Call: PT HAD SEIZURE LAST NIGHT FAMILY WAS TOLD TO CALLL IF THIS HAPPENED   When was the patient last seen:09/02/2020   When did it start: LAST NIGHT  ONE LASTED ONLY A FEW SEC THEN LATER SHE HAD A FULL GRAND MAL   PLEASE ADVISE

## 2020-12-10 ENCOUNTER — HOSPITAL ENCOUNTER (OUTPATIENT)
Dept: GENERAL RADIOLOGY | Facility: HOSPITAL | Age: 20
Discharge: HOME | End: 2020-12-10
Admitting: UROLOGY

## 2020-12-10 ENCOUNTER — TRANSCRIBE ORDERS (OUTPATIENT)
Dept: LAB | Facility: HOSPITAL | Age: 20
End: 2020-12-10

## 2020-12-10 DIAGNOSIS — N20.0 URIC ACID NEPHROLITHIASIS: Primary | ICD-10-CM

## 2020-12-10 DIAGNOSIS — N20.0 URIC ACID NEPHROLITHIASIS: ICD-10-CM

## 2020-12-10 PROCEDURE — 74018 RADEX ABDOMEN 1 VIEW: CPT

## 2020-12-17 ENCOUNTER — OFFICE VISIT (OUTPATIENT)
Dept: NEUROLOGY | Facility: CLINIC | Age: 20
End: 2020-12-17

## 2020-12-17 VITALS
SYSTOLIC BLOOD PRESSURE: 122 MMHG | TEMPERATURE: 98 F | BODY MASS INDEX: 35.63 KG/M2 | OXYGEN SATURATION: 97 % | RESPIRATION RATE: 18 BRPM | HEIGHT: 67 IN | HEART RATE: 112 BPM | WEIGHT: 227 LBS | DIASTOLIC BLOOD PRESSURE: 72 MMHG

## 2020-12-17 DIAGNOSIS — F84.0 AUTISM: ICD-10-CM

## 2020-12-17 DIAGNOSIS — G40.909 SEIZURE DISORDER (HCC): Primary | ICD-10-CM

## 2020-12-17 DIAGNOSIS — F79 INTELLECTUAL DISABILITY: ICD-10-CM

## 2020-12-17 PROCEDURE — 99213 OFFICE O/P EST LOW 20 MIN: CPT | Performed by: NURSE PRACTITIONER

## 2020-12-17 RX ORDER — OXCARBAZEPINE 300 MG/1
TABLET, FILM COATED ORAL
Qty: 90 TABLET | Refills: 2 | Status: SHIPPED | OUTPATIENT
Start: 2020-12-17 | End: 2022-02-04

## 2020-12-17 RX ORDER — MIDAZOLAM 5 MG/.1ML
5 SPRAY NASAL AS NEEDED
Qty: 2 EACH | Refills: 0 | Status: SHIPPED | OUTPATIENT
Start: 2020-12-17 | End: 2021-02-25

## 2020-12-17 NOTE — PROGRESS NOTES
Follow Up Office Visit      Patient Name: Eboni Hernandez  : 2000   MRN: [id number]     Chief Complaint:    Chief Complaint   Patient presents with   • Follow-up     Seizures, Pt's aunt states pt has had two seizures since last visit.       History of Present Illness: Eboni Hernandez is a 20 y.o. female who is here today to follow up for seizures.  She was last seen on 10/14/2020.  She is accompanied by her mother and caregiver, Elizabeth, today.  Eboni had a partial seizure followed by a generalized seizure last week (2020)-unsure how long each lasted but it is estimated less than a couple of minutes.  Her mother says she has not missed any medications doses-taking Oxcarbazepine 300mg BID.  Was on Keppra previously and weaned off of that after previous visit due to aggression.  Her mother says her mood has been much better since starting the Oxcarbazepine. Mother has no other concerns today.  She does not currently have a PRN medication for cluster seizures.  Her mother says she has had the rectal valium in the past but was unable to administer that due to the patient's weight.     Following taken from initial consult note: 19 y/o female with severe autism and seizure disorder is here with her aunt who adopted her at age 2. She was diagnosed with autism and seizures shortly after, around age 3. Her seizures have been generalized tonic-clonic, occasionally partial with eye batting and loss of motor control, last GTC seizure in July, last partial seizure 2 weeks ago. She has been on Keppra since childhood, also takes risperidone, gabapentin and diazepam as mood stabilizers prescribed by psychiatry. EEG was attempted with sedation at [facility] about 4 years ago but was nondiagnostic 22 oversedation. She would not sit still or cooperate for a routine EEG. Patient went thru school. She is nonverbal, never potty trained, able to dress and feed herself. She is very irritable per her aunt. She has a h/o  kidney stones.         Subjective      Review of Systems:   Review of Systems   Constitutional: Negative for chills, fatigue and fever.   HENT: Negative for facial swelling, hearing loss, sore throat, tinnitus and trouble swallowing.    Eyes: Negative for blurred vision, double vision, photophobia and visual disturbance.   Respiratory: Negative for cough, chest tightness and shortness of breath.    Cardiovascular: Negative for chest pain, palpitations and leg swelling.   Gastrointestinal: Negative for abdominal pain, nausea and vomiting.   Endocrine: Negative for cold intolerance and heat intolerance.   Musculoskeletal: Negative for gait problem, neck pain and neck stiffness.   Skin: Negative for color change and rash.   Allergic/Immunologic: Negative for environmental allergies and food allergies.   Neurological: Positive for seizures. Negative for dizziness, syncope, speech difficulty, weakness, light-headedness, numbness, headache and memory problem.   Psychiatric/Behavioral: Negative for behavioral problems, sleep disturbance and depressed mood. The patient is not nervous/anxious.        I have reviewed and the following portions of the patient's history were updated as appropriate: past family history, past medical history, past social history, past surgical history and problem list.    Medications:     Current Outpatient Medications:   •  cloNIDine (Catapres) 0.3 MG tablet, Take 1 tablet by mouth Every Night., Disp: 30 tablet, Rfl: 5  •  diazePAM (VALIUM) 10 MG tablet, Take 1 tablet by mouth 3 (Three) Times a Day as needed for anxiety, Disp: 90 tablet, Rfl: 5  •  diazePAM (VALIUM) 10 MG tablet, Take 1 tablet by mouth 2 (two) times a day as needed for anxiety, Disp: 60 tablet, Rfl: 0  •  gabapentin (NEURONTIN) 600 MG tablet, Take 1 tablet by mouth Every 6 (Six) Hours As Needed For Pain, Disp: 90 tablet, Rfl: 4  •  ibuprofen (ADVIL,MOTRIN) 800 MG tablet, Take 1 tablet by mouth Every 8 (Eight) Hours As Needed for  "Mild Pain., Disp: 30 tablet, Rfl: 0  •  levETIRAcetam (KEPPRA) 1000 MG tablet, Take 1 tablet by mouth 2 (Two) Times a Day., Disp: 60 tablet, Rfl: 7  •  OXcarbazepine (TRILEPTAL) 300 MG tablet, Take 1 tablet by mouth Every Morning AND 2 tablets Every Night., Disp: 90 tablet, Rfl: 2  •  oxyCODONE-acetaminophen (PERCOCET)  MG per tablet, Take 1-2 tablets by mouth every 4 hours as needed for pain, Disp: 30 tablet, Rfl: 0  •  risperiDONE (RisperDAL) 2 MG tablet, Take 1 tablet by mouth 2 (Two) Times a Day., Disp: 60 tablet, Rfl: 5  •  Midazolam (Nayzilam) 5 MG/0.1ML solution, 5 mg into the nostril(s) as directed by provider As Needed (cluster seizures)., Disp: 1 each, Rfl: 0    Allergies:   No Known Allergies    Objective     Physical Exam:  Vital Signs:   Vitals:    12/17/20 0835   BP: 122/72   BP Location: Left arm   Patient Position: Sitting   Cuff Size: Adult   Pulse: 112   Resp: 18   Temp: 98 °F (36.7 °C)   SpO2: 97%   Weight: 103 kg (227 lb)   Height: 170.2 cm (67\")     Body mass index is 35.55 kg/m².    Physical Exam  Vitals signs and nursing note reviewed. Exam conducted with a chaperone present.   Constitutional:       Appearance: She is obese.   Cardiovascular:      Rate and Rhythm: Normal rate and regular rhythm.      Heart sounds: Normal heart sounds.   Pulmonary:      Effort: Pulmonary effort is normal.      Breath sounds: Normal breath sounds.   Neurological:      Mental Status: She is alert. Mental status is at baseline.         Neurologic Exam     Mental Status   Level of consciousness: alert    Gait, Coordination, and Reflexes     Gait  Gait: wide-basedAble to go from sitting to standing position on first attempt x1 assist        Assessment / Plan      Assessment/Plan:   Diagnoses and all orders for this visit:    1. Intellectual disability (Primary)  -     Midazolam (Nayzilam) 5 MG/0.1ML solution; 5 mg into the nostril(s) as directed by provider As Needed (cluster seizures).  Dispense: 1 each; " Refill: 0    2. Seizure disorder (CMS/HCC)  -     OXcarbazepine (TRILEPTAL) 300 MG tablet; Take 1 tablet by mouth Every Morning AND 2 tablets Every Night.  Dispense: 90 tablet; Refill: 2  -     Midazolam (Nayzilam) 5 MG/0.1ML solution; 5 mg into the nostril(s) as directed by provider As Needed (cluster seizures).  Dispense: 1 each; Refill: 0. Indications and possible SEs discussed.   - Seizure precautions in place.     3. BMI 35.0-35.9,adult  -     Midazolam (Nayzilam) 5 MG/0.1ML solution; 5 mg into the nostril(s) as directed by provider As Needed (cluster seizures).  Dispense: 1 each; Refill: 0       Follow Up:   Return in about 8 weeks (around 2/11/2021) for Recheck-telephone visit.    FRANCISCO Freedman, FNP-C  [facility]rology and Sleep Medicine       Please note that portions of this note may have been completed with a voice recognition program. Efforts were made to edit the dictations, but occasionally words are mistranscribed.

## 2021-01-19 ENCOUNTER — TRANSCRIBE ORDERS (OUTPATIENT)
Dept: LAB | Facility: HOSPITAL | Age: 21
End: 2021-01-19

## 2021-01-19 ENCOUNTER — LAB (OUTPATIENT)
Dept: LAB | Facility: HOSPITAL | Age: 21
End: 2021-01-19

## 2021-01-19 ENCOUNTER — HOSPITAL ENCOUNTER (OUTPATIENT)
Dept: GENERAL RADIOLOGY | Facility: HOSPITAL | Age: 21
Discharge: HOME | End: 2021-01-19

## 2021-01-19 DIAGNOSIS — N20.0 KIDNEY STONE: Primary | ICD-10-CM

## 2021-01-19 DIAGNOSIS — N20.0 KIDNEY STONE: ICD-10-CM

## 2021-01-19 PROCEDURE — 74018 RADEX ABDOMEN 1 VIEW: CPT

## 2021-01-22 ENCOUNTER — TELEPHONE (OUTPATIENT)
Dept: NEUROLOGY | Facility: CLINIC | Age: 21
End: 2021-01-22

## 2021-01-22 NOTE — TELEPHONE ENCOUNTER
Provider: FRANCISCO ISABEL  Caller: KEYUR WASHINGTON  Relationship to Patient: PT'S AUNT    Reason for Call: PT'S AUNT KEYURMAHIN WASHINGTON IS CALLING TO LET JIM KNOW THAT THE PT IS HAVING MORE SEIZURES FOR THE PAST 2 WEEKS. THEY DON'T LAST LONG BUT THEY ARE GETTING MORE AGGRESSIVE AND WHEN SHE COMES OUT OF IT, SHE CAN'T MOVE FOR ABOUT 5 MINUTES THEN SHE GETS UP AND IS OK.  SHE DON'T KNOW WHAT ELSE SHE CAN DO SHE IS WANTING TO SHE WHAT JIM CAN RECOMMEND.        PLEASE CALL HER BACK [phone]

## 2021-01-25 DIAGNOSIS — F79 INTELLECTUAL DISABILITY: ICD-10-CM

## 2021-01-25 DIAGNOSIS — F84.0 AUTISM: ICD-10-CM

## 2021-01-25 DIAGNOSIS — G40.909 SEIZURE DISORDER (HCC): Primary | ICD-10-CM

## 2021-01-25 RX ORDER — LACOSAMIDE 50 MG/1
50 TABLET ORAL 2 TIMES DAILY
Qty: 60 TABLET | Refills: 2 | Status: SHIPPED | OUTPATIENT
Start: 2021-01-25 | End: 2021-04-07 | Stop reason: DRUGHIGH

## 2021-01-25 NOTE — TELEPHONE ENCOUNTER
PT'S AUNT CALLED BACK IN STATING THE PHARMACY DOESN'T HAVE THE RX. NOTIFIED IT WAS SENT TO  PHARMACY. SHE STATES SHE WILL CALL THE PHARMACY BACK BUT STATES THEY TOLD HER THEY DO NOT HAVE THE RX.      CALL BACK- [phone]

## 2021-01-25 NOTE — TELEPHONE ENCOUNTER
Please let Eboni's mother know I am going to add Vimpat 50mg BID to her medication regimen. This is another antiepileptic medication. As with any other medication like this the most common SEs are dizziness, headache and nausea. Most likely we will do the 50mg BID and then go to 100mg BID. I want to see if she has a decrease in her seizure activity. Please make sure she has a follow up with me 4 weeks after starting the Vimpat. Also, let mom know it is a controlled substance and when she comes in for her follow up we will have her sign a CSA. Thank you.

## 2021-01-25 NOTE — TELEPHONE ENCOUNTER
Spoke with patients aunt. Informed her of previous message. Stated she would  her medication today.     Scheduled patient a 4 week telephone visit for February 25th at 9:30am.

## 2021-01-25 NOTE — TELEPHONE ENCOUNTER
Spoke with patients guardian let him know the patients medication will not be at the pharmacy until in the morning.    due to the pharmacy having to order the medication.

## 2021-01-29 ENCOUNTER — TELEMEDICINE (OUTPATIENT)
Dept: PSYCHIATRY | Facility: CLINIC | Age: 21
End: 2021-01-29

## 2021-01-29 DIAGNOSIS — R46.89 AGGRESSION: ICD-10-CM

## 2021-01-29 DIAGNOSIS — F84.0 AUTISM SPECTRUM DISORDER REQUIRING VERY SUBSTANTIAL SUPPORT (LEVEL 3): ICD-10-CM

## 2021-01-29 DIAGNOSIS — F41.8 OTHER SPECIFIED ANXIETY DISORDERS: ICD-10-CM

## 2021-01-29 PROCEDURE — 99214 OFFICE O/P EST MOD 30 MIN: CPT | Performed by: PSYCHIATRY & NEUROLOGY

## 2021-01-29 RX ORDER — CLONIDINE HYDROCHLORIDE 0.3 MG/1
0.3 TABLET ORAL NIGHTLY
Qty: 30 TABLET | Refills: 5 | Status: SHIPPED | OUTPATIENT
Start: 2021-01-29 | End: 2021-07-23 | Stop reason: SDUPTHER

## 2021-01-29 RX ORDER — DIAZEPAM 10 MG/1
10 TABLET ORAL 3 TIMES DAILY
Qty: 90 TABLET | Refills: 5 | Status: SHIPPED | OUTPATIENT
Start: 2021-01-29 | End: 2021-02-25

## 2021-01-29 RX ORDER — RISPERIDONE 2 MG/1
2 TABLET ORAL 2 TIMES DAILY
Qty: 60 TABLET | Refills: 5 | Status: SHIPPED | OUTPATIENT
Start: 2021-01-29 | End: 2021-07-23 | Stop reason: SDUPTHER

## 2021-01-29 NOTE — PROGRESS NOTES
Subjective   Eboni Hernandez is a 20 y.o. female who presents today for follow up    This provider is located at [facility] at [address] [place] [address]. The provider identified himself as well as his credentials.   The Patient is at home using her phone because problems with video connection. The patient's condition being diagnosed/treated is appropriate for telemedicine. The patient and guardian gave consent to be seen remotely, and when consent is given they understand that the consent allows for patient identifiable information to be sent to a third party as needed.   They may refuse to be seen remotely at any time. The electronic data is encrypted and password protected, and the patient has been advised of the potential risks to privacy not withstanding such measures        Chief Complaint: Intellectual disability and autism spectrum disorder    History of Present Illness: Patient presents for follow-up for autism spectrum disorder.  Patient continues to do well from a mental health standpoint.  She is not having any major behavioral issues and does not show any anxiety or depressive symptoms.  She is having worsening seizures and is having seizures daily for the past 6 weeks which has led to some changes in her seizure medications.  She is not having any major side effects of medications and is not having any sleep or appetite issues.   She denies SI/HI/AVH.    The following portions of the patient's history were reviewed and updated as appropriate: allergies, current medications, past family history, past medical history, past social history, past surgical history and problem list.      Past Medical History:  Past Medical History:   Diagnosis Date   • Anxiety    • Anxiety    • Autism    • Chronic pelvic pain in female    • Communication deficit    • Developmental delay    • Encopresis    • Enuresis    • Global developmental delay    • History of kidney stones     6 WEEKS  AGO   • Kidney stones    • Multiple developmental ovarian cysts    • Scoliosis    • Seizures (CMS/HCC) 2018    LAST SEIZURE WAS ONE WEEK AGO-PETITE MAL.  LAST GRAND MAL SEIZURE WAS 4-5 MONTHS AGO   • Seizures (CMS/HCC)        Social History:  Social History     Socioeconomic History   • Marital status: Single     Spouse name: Not on file   • Number of children: Not on file   • Years of education: Not on file   • Highest education level: Not on file   Tobacco Use   • Smoking status: Never Smoker   • Smokeless tobacco: Never Used   Substance and Sexual Activity   • Alcohol use: No   • Drug use: No   • Sexual activity: Defer       Family History:  Family History   Problem Relation Age of Onset   • Drug abuse Mother    • Anxiety disorder Mother    • Depression Mother    • Drug abuse Father    • ADD / ADHD Neg Hx    • Alcohol abuse Neg Hx    • Bipolar disorder Neg Hx    • Dementia Neg Hx    • OCD Neg Hx    • Paranoid behavior Neg Hx    • Schizophrenia Neg Hx    • Seizures Neg Hx    • Self-Injurious Behavior  Neg Hx    • Suicide Attempts Neg Hx        Past Surgical History:  Past Surgical History:   Procedure Laterality Date   • DIAGNOSTIC LAPAROSCOPY N/A 9/11/2018    Procedure: DIAGNOSTIC LAPAROSCOPY drainage of right ovarian cyst;  Surgeon: Amol Ospina MD;  Location: [facility];  Service: Obstetrics/Gynecology   • INTRAUTERINE DEVICE INSERTION     • OTHER SURGICAL HISTORY      IUD PLACEMENT IN OR   • URETEROSCOPY LASER LITHOTRIPSY WITH STENT INSERTION  2018   • URETEROSCOPY STENT INSERTION  2020       Problem List:  Patient Active Problem List   Diagnosis   • Problems with communication   • Intellectual disability, profound   • Encopresis   • Enuresis   • Aggression   • Insomnia due to other mental disorder   • Autism spectrum disorder   • Cyst of ovary   • Pelvic pain   • Nephrolithiasis   • Nexplanon in place   • Seizure disorder (CMS/HCC)       Allergy:   No Known Allergies     Current Medications:   Current  Outpatient Medications   Medication Sig Dispense Refill   • cloNIDine (Catapres) 0.3 MG tablet Take 1 tablet by mouth Every Night. 30 tablet 5   • diazePAM (VALIUM) 10 MG tablet Take 1 tablet by mouth 2 (two) times a day as needed for anxiety 60 tablet 0   • diazePAM (VALIUM) 10 MG tablet Take 1 tablet by mouth 3 (Three) Times a Day as needed for anxiety 90 tablet 5   • gabapentin (NEURONTIN) 600 MG tablet Take 1 tablet by mouth Every 6 (Six) Hours As Needed For Pain 90 tablet 4   • ibuprofen (ADVIL,MOTRIN) 800 MG tablet Take 1 tablet by mouth Every 8 (Eight) Hours As Needed for Mild Pain. 30 tablet 0   • lacosamide (VIMPAT) 50 MG tablet tablet Take 1 tablet by mouth 2 (two) times a day. 60 tablet 2   • Midazolam (Nayzilam) 5 MG/0.1ML solution Spray 5 mg in the nose as directed for seizure>5 minutes. May repeat dose in 10 minutes in other nostril if seizures continue 2 each 0   • OXcarbazepine (TRILEPTAL) 300 MG tablet Take 1 tablet by mouth Every Morning AND 2 tablets Every Night. 90 tablet 2   • oxyCODONE-acetaminophen (PERCOCET)  MG per tablet Take 1-2 tablets by mouth every 4 hours as needed for pain 30 tablet 0   • risperiDONE (RisperDAL) 2 MG tablet Take 1 tablet by mouth 2 (Two) Times a Day. 60 tablet 5     No current facility-administered medications for this visit.        Review of Symptoms:    Review of Systems   Constitutional: Negative for activity change.   HENT: Negative.    Eyes: Negative.    Respiratory: Negative.    Cardiovascular: Negative.    Gastrointestinal: Negative.    Endocrine: Negative.    Genitourinary: Negative.    Musculoskeletal: Negative.    Allergic/Immunologic: Negative.    Neurological: Positive for seizures and speech difficulty (nonverbal).   Hematological: Negative.    Psychiatric/Behavioral: Negative for agitation, behavioral problems, sleep disturbance and negative for hyperactivity. The patient is not nervous/anxious.          Physical Exam:   not currently  breastfeeding.  Unable to assess, telephone visit    Appearance: Unable to assess, telephone visit  Gait, Station, Strength: Unable to assess, telephone visit    Mental Status Exam:   Hygiene:   Unable to assess, telephone visit  Cooperation:  apathetic, unengaged  Eye Contact:  Unable to assess, telephone visit  Psychomotor Behavior:  Unable to assess, telephone visit  Affect:  Blunted  Mood: normal  Speech:  Mute and lack of meaningful speech, makes noises  Thought Process:  Unable to demonstrate  Thought Content:  Unable to demonstrate  Suicidal:  unable to assess  Homicidal:  unable to assess  Hallucinations:  unable to assess  Delusion:  Unable to demonstrate  Memory:  Unable to evaluate  Orientation:  Unable to evaluate  Reliability:  unable to assess  Insight:  unable to assess  Judgement:  Impaired  Impulse Control:  Impaired  Physical/Medical Issues:  Yes seizure disorder       Lab Results:   No visits with results within 1 Month(s) from this visit.   Latest known visit with results is:   Admission on 03/08/2020, Discharged on 03/08/2020   Component Date Value Ref Range Status   • Glucose 03/08/2020 106* 65 - 99 mg/dL Final   • BUN 03/08/2020 10  6 - 20 mg/dL Final   • Creatinine 03/08/2020 0.76  0.57 - 1.00 mg/dL Final   • Sodium 03/08/2020 141  136 - 145 mmol/L Final   • Potassium 03/08/2020 4.1  3.5 - 5.2 mmol/L Final   • Chloride 03/08/2020 103  98 - 107 mmol/L Final   • CO2 03/08/2020 25.6  22.0 - 29.0 mmol/L Final   • Calcium 03/08/2020 9.6  8.6 - 10.5 mg/dL Final   • Total Protein 03/08/2020 7.3  6.0 - 8.5 g/dL Final   • Albumin 03/08/2020 4.00  3.50 - 5.20 g/dL Final   • ALT (SGPT) 03/08/2020 16  1 - 33 U/L Final   • AST (SGOT) 03/08/2020 13  1 - 32 U/L Final   • Alkaline Phosphatase 03/08/2020 76  39 - 117 U/L Final   • Total Bilirubin 03/08/2020 0.4  0.2 - 1.2 mg/dL Final   • eGFR  African Amer 03/08/2020 118  >60 mL/min/1.73 Final   • Globulin 03/08/2020 3.3  gm/dL Final   • A/G Ratio 03/08/2020  1.2  g/dL Final   • BUN/Creatinine Ratio 03/08/2020 13.2  7.0 - 25.0 Final   • Anion Gap 03/08/2020 12.4  5.0 - 15.0 mmol/L Final   • Lipase 03/08/2020 12* 13 - 60 U/L Final   • Color, UA 03/08/2020 Yellow  Yellow, Straw Final   • Appearance, UA 03/08/2020 Clear  Clear Final   • pH, UA 03/08/2020 <=5.0  5.0 - 8.0 Final   • Specific Gravity, UA 03/08/2020 1.027  1.005 - 1.030 Final   • Glucose, UA 03/08/2020 Negative  Negative Final   • Ketones, UA 03/08/2020 Negative  Negative Final   • Bilirubin, UA 03/08/2020 Negative  Negative Final   • Blood, UA 03/08/2020 Trace* Negative Final   • Protein, UA 03/08/2020 Negative  Negative Final   • Leuk Esterase, UA 03/08/2020 Negative  Negative Final   • Nitrite, UA 03/08/2020 Negative  Negative Final   • Urobilinogen, UA 03/08/2020 0.2 E.U./dL  0.2 - 1.0 E.U./dL Final   • Extra Tube 03/08/2020 hold for add-on   Final    Auto resulted   • Extra Tube 03/08/2020 Hold for add-ons.   Final    Auto resulted.   • Extra Tube 03/08/2020 hold for add-on   Final    Auto resulted   • Extra Tube 03/08/2020 Hold for add-ons.   Final    Auto resulted.   • WBC 03/08/2020 5.49  3.40 - 10.80 10*3/mm3 Final   • RBC 03/08/2020 4.55  3.77 - 5.28 10*6/mm3 Final   • Hemoglobin 03/08/2020 12.7  12.0 - 15.9 g/dL Final   • Hematocrit 03/08/2020 38.5  34.0 - 46.6 % Final   • MCV 03/08/2020 84.6  79.0 - 97.0 fL Final   • MCH 03/08/2020 27.9  26.6 - 33.0 pg Final   • MCHC 03/08/2020 33.0  31.5 - 35.7 g/dL Final   • RDW 03/08/2020 12.5  12.3 - 15.4 % Final   • RDW-SD 03/08/2020 38.3  37.0 - 54.0 fl Final   • MPV 03/08/2020 11.7  6.0 - 12.0 fL Final   • Platelets 03/08/2020 226  140 - 450 10*3/mm3 Final   • Neutrophil % 03/08/2020 43.8  42.7 - 76.0 % Final   • Lymphocyte % 03/08/2020 43.7  19.6 - 45.3 % Final   • Monocyte % 03/08/2020 10.0  5.0 - 12.0 % Final   • Eosinophil % 03/08/2020 1.8  0.3 - 6.2 % Final   • Basophil % 03/08/2020 0.7  0.0 - 1.5 % Final   • Immature Grans % 03/08/2020 0.0  0.0 - 0.5 %  Final   • Neutrophils, Absolute 03/08/2020 2.40  1.70 - 7.00 10*3/mm3 Final   • Lymphocytes, Absolute 03/08/2020 2.40  0.70 - 3.10 10*3/mm3 Final   • Monocytes, Absolute 03/08/2020 0.55  0.10 - 0.90 10*3/mm3 Final   • Eosinophils, Absolute 03/08/2020 0.10  0.00 - 0.40 10*3/mm3 Final   • Basophils, Absolute 03/08/2020 0.04  0.00 - 0.20 10*3/mm3 Final   • Immature Grans, Absolute 03/08/2020 0.00  0.00 - 0.05 10*3/mm3 Final   • nRBC 03/08/2020 0.0  0.0 - 0.2 /100 WBC Final   • RBC, UA 03/08/2020 0-2* None Seen /HPF Final   • WBC, UA 03/08/2020 0-2* None Seen /HPF Final   • Bacteria, UA 03/08/2020 Trace* None Seen /HPF Final   • Squamous Epithelial Cells, UA 03/08/2020 0-2  None Seen, 0-2 /HPF Final   • Hyaline Casts, UA 03/08/2020 None Seen  None Seen /LPF Final   • Mucus, UA 03/08/2020 Small/1+* None Seen, Trace /HPF Final   • Methodology 03/08/2020 Manual Light Microscopy   Final       Assessment/Plan   Diagnoses and all orders for this visit:    1. Autism spectrum disorder requiring very substantial support (level 3)  -     risperiDONE (RisperDAL) 2 MG tablet; Take 1 tablet by mouth 2 (Two) Times a Day.  Dispense: 60 tablet; Refill: 5  -     cloNIDine (Catapres) 0.3 MG tablet; Take 1 tablet by mouth Every Night.  Dispense: 30 tablet; Refill: 5  -     diazePAM (VALIUM) 10 MG tablet; Take 1 tablet by mouth 3 (Three) Times a Day as needed for anxiety  Dispense: 90 tablet; Refill: 5    2. Aggression  -     risperiDONE (RisperDAL) 2 MG tablet; Take 1 tablet by mouth 2 (Two) Times a Day.  Dispense: 60 tablet; Refill: 5  -     cloNIDine (Catapres) 0.3 MG tablet; Take 1 tablet by mouth Every Night.  Dispense: 30 tablet; Refill: 5  -     diazePAM (VALIUM) 10 MG tablet; Take 1 tablet by mouth 3 (Three) Times a Day as needed for anxiety  Dispense: 90 tablet; Refill: 5    3. Other specified anxiety disorders  -     cloNIDine (Catapres) 0.3 MG tablet; Take 1 tablet by mouth Every Night.  Dispense: 30 tablet; Refill: 5  -      diazePAM (VALIUM) 10 MG tablet; Take 1 tablet by mouth 3 (Three) Times a Day as needed for anxiety  Dispense: 90 tablet; Refill: 5    -Patient continues to be stable and at baseline with no major symptom burden for mental health issues.  -Reviewed previous documentation  -Reviewed most recent available labs  -DOUGLAS reviewed and appropriate. Patient counseled on use of controlled substances.   -Continue Risperdal 2 mg twice daily for autism spectrum disorder, behavioral disturbance, and anxiety  -Continue diazepam 5-10 mg p.o. 3 times daily as needed for anxiety.  Patient does not take this every day.  -Continue clonidine 0.3 mg p.o. nightly for insomnia, behaviors, and anxiety  -Continue Gabapentin 600 mg 3 times daily for mood and pain  -Approximate appointment time 9:15 AM to 9:30 AM via telephone visit      Visit Diagnoses:    ICD-10-CM ICD-9-CM   1. Autism spectrum disorder requiring very substantial support (level 3)  F84.0 299.00   2. Aggression  R46.89 V40.39   3. Other specified anxiety disorders  F41.8 300.09       TREATMENT PLAN/GOALS: Continue supportive psychotherapy efforts and medications as indicated. Treatment and medication options discussed during today's visit. Patient acknowledged and verbally consented to continue with current treatment plan and was educated on the importance of compliance with treatment and follow-up appointments.    MEDICATION ISSUES:    Discussed medication options and treatment plan of prescribed medication as well as the risks, benefits, and side effects including potential falls, possible impaired driving and metabolic adversities among others. Patient is agreeable to call the office with any worsening of symptoms or onset of side effects. Patient is agreeable to call 911 or go to the nearest ER should he/she begin having SI/HI.     MEDS ORDERED DURING VISIT:  New Medications Ordered This Visit   Medications   • risperiDONE (RisperDAL) 2 MG tablet     Sig: Take 1 tablet  by mouth 2 (Two) Times a Day.     Dispense:  60 tablet     Refill:  5   • cloNIDine (Catapres) 0.3 MG tablet     Sig: Take 1 tablet by mouth Every Night.     Dispense:  30 tablet     Refill:  5   • diazePAM (VALIUM) 10 MG tablet     Sig: Take 1 tablet by mouth 3 (Three) Times a Day as needed for anxiety     Dispense:  90 tablet     Refill:  5       Return in about 6 months (around 7/29/2021).             This document has been electronically signed by Simon Kapoor MD  January 29, 2021 18:50 EST

## 2021-02-25 ENCOUNTER — OFFICE VISIT (OUTPATIENT)
Dept: NEUROLOGY | Facility: CLINIC | Age: 21
End: 2021-02-25

## 2021-02-25 DIAGNOSIS — F84.0 AUTISM: ICD-10-CM

## 2021-02-25 DIAGNOSIS — F79 INTELLECTUAL DISABILITY: ICD-10-CM

## 2021-02-25 DIAGNOSIS — G40.909 SEIZURE DISORDER (HCC): Primary | ICD-10-CM

## 2021-02-25 PROCEDURE — 99442 PR PHYS/QHP TELEPHONE EVALUATION 11-20 MIN: CPT | Performed by: NURSE PRACTITIONER

## 2021-02-25 RX ORDER — DIAZEPAM 10 MG/2ML
10 GEL RECTAL ONCE
Qty: 1 EACH | Refills: 2 | Status: SHIPPED | OUTPATIENT
Start: 2021-02-25 | End: 2021-02-26

## 2021-02-25 NOTE — PROGRESS NOTES
"     Follow Up Office Visit      Patient Name: Eboni Hernandez  : 2000   MRN: [id number]     Chief Complaint:    Chief Complaint   Patient presents with   • Follow-up     patient following up on seizures.        History of Present Illness: Eboni Hernandez is a 20 y.o. female who presents today via telephone for seizure disorder.  Her mother, who is her caregiver and POA, Her mother says she is having multiple seizures per day-approximately 2-4 per day.  Although they do not last longer than a minute she is very sleepy after the episodes.  Her mother says she has developed abnormal facial movements-her mother describes as a \"tic\".  She is able to eat and drink normally but her mother says her appetite is decreased on the days she has multiple seizures.  She is able to move all of her extremities normally.  She denies any facial drooping.  She denies any injuries as the result of her seizures.  Due to her impaired communication she is unable to tell her mother if something is hurting her.  She is taking Vimpat 50mg BID and Oxcarbazepine 300mg every morning and 600mg every night.  Her mother is very concerned because her seizures were very well controlled for many years prior to 2020.  Her mother says there were no life changes around the time of her increased seizure activity.  The patient is never left unattended.   *An urgent appointment was made for 2020 with Dr. Ahumada-during patient's appointment today.     Following taken from previous visit note: Eboni Hernandez is a 20 y.o. female who is here today to follow up for seizures.  She is accompanied by her aunt, Elizabeth, who is also her caregiver.  The patient is taking Oxcarbazepine 300mg BID and tolerating that well.  She has had no seizures.  Her last seizure was 2020.  She is still taking the Keppra and her aunt is agreeable to weaning off of that to see if it helps with her irritability.       Following taken " from previous visit note: 19 y/o female with severe autism and seizure disorder is here with her aunt who adopted her at age 2. She was diagnosed with autism and seizures shortly after, around age 3. Her seizures have been generalized tonic-clonic, occasionally partial with eye batting and loss of motor control, last GTC seizure in July, last partial seizure 2 weeks ago. She has been on Keppra since childhood, also takes risperidone, gabapentin and diazepam as mood stabilizers prescribed by psychiatry. EEG was attempted with sedation at [facility] about 4 years ago but was nondiagnostic 22 oversedation. She would not sit still or cooperate for a routine EEG. Patient went thru school. She is nonverbal, never potty trained, able to dress and feed herself. She is very irritable per her aunt. She has a h/o kidney stones.    Subjective      Review of Systems:   Review of Systems   Constitutional: Positive for appetite change and fatigue. Negative for chills and fever.   HENT: Negative for facial swelling, hearing loss, sore throat, tinnitus and trouble swallowing.    Eyes: Negative for blurred vision, double vision, photophobia and visual disturbance.   Respiratory: Negative for cough, chest tightness and shortness of breath.    Cardiovascular: Negative for chest pain, palpitations and leg swelling.   Gastrointestinal: Negative for abdominal pain, nausea and vomiting.   Endocrine: Negative for cold intolerance and heat intolerance.   Musculoskeletal: Positive for gait problem. Negative for neck pain and neck stiffness.   Skin: Negative for color change and rash.   Allergic/Immunologic: Negative for environmental allergies and food allergies.   Neurological: Positive for seizures and speech difficulty. Negative for dizziness, syncope, weakness, light-headedness, numbness, headache and memory problem.   Psychiatric/Behavioral: Positive for sleep disturbance. Negative for behavioral problems and depressed mood. The patient is  not nervous/anxious.        I have reviewed and the following portions of the patient's history were updated as appropriate: past family history, past medical history, past social history, past surgical history and problem list.    Medications:     Current Outpatient Medications:   •  cloNIDine (Catapres) 0.3 MG tablet, Take 1 tablet by mouth Every Night., Disp: 30 tablet, Rfl: 5  •  diazePAM (VALIUM) 10 MG tablet, Take 1 tablet by mouth 2 (two) times a day as needed for anxiety, Disp: 60 tablet, Rfl: 0  •  gabapentin (NEURONTIN) 600 MG tablet, Take 1 tablet by mouth Every 6 (Six) Hours As Needed For Pain, Disp: 90 tablet, Rfl: 4  •  ibuprofen (ADVIL,MOTRIN) 800 MG tablet, Take 1 tablet by mouth Every 8 (Eight) Hours As Needed for Mild Pain., Disp: 30 tablet, Rfl: 0  •  lacosamide (VIMPAT) 50 MG tablet tablet, Take 1 tablet by mouth 2 (two) times a day., Disp: 60 tablet, Rfl: 2  •  OXcarbazepine (TRILEPTAL) 300 MG tablet, Take 1 tablet by mouth Every Morning AND 2 tablets Every Night., Disp: 90 tablet, Rfl: 2  •  oxyCODONE-acetaminophen (PERCOCET)  MG per tablet, Take 1-2 tablets by mouth every 4 hours as needed for pain, Disp: 30 tablet, Rfl: 0  •  risperiDONE (RisperDAL) 2 MG tablet, Take 1 tablet by mouth 2 (Two) Times a Day., Disp: 60 tablet, Rfl: 5    Allergies:   No Known Allergies    Objective     Physical Exam:  Vital Signs: There were no vitals filed for this visit.  There is no height or weight on file to calculate BMI.    Physical Exam    Neurologic Exam     *Physical examination unable to be done due to the nature of a telephone health visit-conversation was had with patient's mother and POA.     Assessment / Plan      Assessment/Plan:   Diagnoses and all orders for this visit:    1. Seizure disorder (CMS/[facility]) (Primary)  -     Ambulatory Referral to Neurology-Dr. Ahumada Monday March 1st at 11am.   - I have discussed seizure precautions with patient's mother and they are in place. Advised her to  take patient to the hospital for any seizure lasting more than 5 minutes and she is agreeable.   - Diastat ordered and indications and possible SEs discussed with patient's mother. Ryan reviewed.     2. Intellectual disability  -     Ambulatory Referral to Neurology    3. Autism  -     Ambulatory Referral to Neurology    4. BMI 35.0-35.9,adult         Follow Up:   No follow-ups on file. Telephone health visit lasted for 12 minutes.     *This visit was a telephone health visit that occurred during the COVID-19 pandemic and lasted for 12 minutes.     FRANCISCO Freedman, FNP-C  [facility]       Please note that portions of this note may have been completed with a voice recognition program. Efforts were made to edit the dictations, but occasionally words are mistranscribed.

## 2021-04-12 ENCOUNTER — LAB (OUTPATIENT)
Dept: LAB | Facility: HOSPITAL | Age: 21
End: 2021-04-12

## 2021-04-12 ENCOUNTER — HOSPITAL ENCOUNTER (OUTPATIENT)
Dept: GENERAL RADIOLOGY | Facility: HOSPITAL | Age: 21
Discharge: HOME | End: 2021-04-12
Admitting: UROLOGY

## 2021-04-12 ENCOUNTER — TRANSCRIBE ORDERS (OUTPATIENT)
Dept: LAB | Facility: HOSPITAL | Age: 21
End: 2021-04-12

## 2021-04-12 DIAGNOSIS — N30.20 BLADDER INFECTION, CHRONIC: ICD-10-CM

## 2021-04-12 DIAGNOSIS — N30.20 BLADDER INFECTION, CHRONIC: Primary | ICD-10-CM

## 2021-04-12 PROCEDURE — 74018 RADEX ABDOMEN 1 VIEW: CPT

## 2021-05-10 ENCOUNTER — HOSPITAL ENCOUNTER (OUTPATIENT)
Dept: GENERAL RADIOLOGY | Facility: HOSPITAL | Age: 21
Discharge: HOME | End: 2021-05-10

## 2021-05-10 ENCOUNTER — HOSPITAL ENCOUNTER (OUTPATIENT)
Dept: CT IMAGING | Facility: HOSPITAL | Age: 21
Discharge: HOME | End: 2021-05-10

## 2021-05-10 ENCOUNTER — TRANSCRIBE ORDERS (OUTPATIENT)
Dept: GENERAL RADIOLOGY | Facility: HOSPITAL | Age: 21
End: 2021-05-10

## 2021-05-10 DIAGNOSIS — N20.0 URIC ACID NEPHROLITHIASIS: ICD-10-CM

## 2021-05-10 DIAGNOSIS — N20.0 URIC ACID NEPHROLITHIASIS: Primary | ICD-10-CM

## 2021-05-10 PROCEDURE — 74176 CT ABD & PELVIS W/O CONTRAST: CPT

## 2021-05-10 PROCEDURE — 74018 RADEX ABDOMEN 1 VIEW: CPT

## 2021-06-09 ENCOUNTER — HOSPITAL ENCOUNTER (OUTPATIENT)
Dept: GENERAL RADIOLOGY | Facility: HOSPITAL | Age: 21
Discharge: HOME | End: 2021-06-09

## 2021-06-09 ENCOUNTER — TRANSCRIBE ORDERS (OUTPATIENT)
Dept: LAB | Facility: HOSPITAL | Age: 21
End: 2021-06-09

## 2021-06-09 ENCOUNTER — LAB (OUTPATIENT)
Dept: LAB | Facility: HOSPITAL | Age: 21
End: 2021-06-09

## 2021-06-09 DIAGNOSIS — N20.0 URIC ACID NEPHROLITHIASIS: ICD-10-CM

## 2021-06-09 DIAGNOSIS — N20.0 URIC ACID NEPHROLITHIASIS: Primary | ICD-10-CM

## 2021-06-09 PROCEDURE — 74018 RADEX ABDOMEN 1 VIEW: CPT

## 2021-07-23 ENCOUNTER — TELEMEDICINE (OUTPATIENT)
Dept: PSYCHIATRY | Facility: CLINIC | Age: 21
End: 2021-07-23

## 2021-07-23 DIAGNOSIS — R46.89 AGGRESSION: ICD-10-CM

## 2021-07-23 DIAGNOSIS — F84.0 AUTISM SPECTRUM DISORDER REQUIRING VERY SUBSTANTIAL SUPPORT (LEVEL 3): Primary | ICD-10-CM

## 2021-07-23 DIAGNOSIS — F41.8 OTHER SPECIFIED ANXIETY DISORDERS: ICD-10-CM

## 2021-07-23 DIAGNOSIS — F79 INTELLECTUAL DISABILITY: ICD-10-CM

## 2021-07-23 PROCEDURE — 99214 OFFICE O/P EST MOD 30 MIN: CPT | Performed by: PSYCHIATRY & NEUROLOGY

## 2021-07-23 RX ORDER — RISPERIDONE 2 MG/1
2 TABLET ORAL 2 TIMES DAILY
Qty: 60 TABLET | Refills: 5 | Status: SHIPPED | OUTPATIENT
Start: 2021-07-23 | End: 2022-01-21 | Stop reason: SDUPTHER

## 2021-07-23 RX ORDER — CLONIDINE HYDROCHLORIDE 0.3 MG/1
0.3 TABLET ORAL NIGHTLY
Qty: 30 TABLET | Refills: 5 | Status: SHIPPED | OUTPATIENT
Start: 2021-07-23 | End: 2022-01-21 | Stop reason: SDUPTHER

## 2021-07-23 NOTE — PROGRESS NOTES
Subjective   Eboni Hernandez is a 21 y.o. female who presents today for follow up    This provider is located at [facility] at [address] [place] [address]. The provider identified himself as well as his credentials.   The Patient is at home using her phone because problems with video connection. The patient's condition being diagnosed/treated is appropriate for telemedicine. The patient and guardian gave consent to be seen remotely, and when consent is given they understand that the consent allows for patient identifiable information to be sent to a third party as needed.   They may refuse to be seen remotely at any time. The electronic data is encrypted and password protected, and the patient has been advised of the potential risks to privacy not withstanding such measures        Chief Complaint: Intellectual disability and autism spectrum disorder    History of Present Illness: Patient continues to be stable and at baseline with her mental health.  She is doing relatively well.  She recently got vaccinated for COVID-19.  She was having a substantial increase in seizure activity but they have made adjustments to her seizure medications and their frequency and severity has reduced.  She is not having any major behavioral disruptions.  She is not having any major mood or anxiety symptoms.  Sleep and appetite are appropriate.  Patient largely nonverbal but no suicidal behaviors.    The following portions of the patient's history were reviewed and updated as appropriate: allergies, current medications, past family history, past medical history, past social history, past surgical history and problem list.      Past Medical History:  Past Medical History:   Diagnosis Date   • Anxiety    • Anxiety    • Autism    • Chronic pelvic pain in female    • Communication deficit    • Developmental delay    • Encopresis    • Enuresis    • Global developmental delay    • History of kidney stones      6 WEEKS AGO   • Kidney stones    • Multiple developmental ovarian cysts    • Scoliosis    • Seizures (CMS/HCC) 2018    LAST SEIZURE WAS ONE WEEK AGO-PETITE MAL.  LAST GRAND MAL SEIZURE WAS 4-5 MONTHS AGO   • Seizures (CMS/HCC)        Social History:  Social History     Socioeconomic History   • Marital status: Single     Spouse name: Not on file   • Number of children: Not on file   • Years of education: Not on file   • Highest education level: Not on file   Tobacco Use   • Smoking status: Never Smoker   • Smokeless tobacco: Never Used   Substance and Sexual Activity   • Alcohol use: No   • Drug use: No   • Sexual activity: Defer       Family History:  Family History   Problem Relation Age of Onset   • Drug abuse Mother    • Anxiety disorder Mother    • Depression Mother    • Drug abuse Father    • ADD / ADHD Neg Hx    • Alcohol abuse Neg Hx    • Bipolar disorder Neg Hx    • Dementia Neg Hx    • OCD Neg Hx    • Paranoid behavior Neg Hx    • Schizophrenia Neg Hx    • Seizures Neg Hx    • Self-Injurious Behavior  Neg Hx    • Suicide Attempts Neg Hx        Past Surgical History:  Past Surgical History:   Procedure Laterality Date   • DIAGNOSTIC LAPAROSCOPY N/A 9/11/2018    Procedure: DIAGNOSTIC LAPAROSCOPY drainage of right ovarian cyst;  Surgeon: Amlo Ospina MD;  Location: [facility];  Service: Obstetrics/Gynecology   • INTRAUTERINE DEVICE INSERTION     • OTHER SURGICAL HISTORY      IUD PLACEMENT IN OR   • URETEROSCOPY LASER LITHOTRIPSY WITH STENT INSERTION  2018   • URETEROSCOPY STENT INSERTION  2020       Problem List:  Patient Active Problem List   Diagnosis   • Problems with communication   • Intellectual disability, profound   • Encopresis   • Enuresis   • Aggression   • Insomnia due to other mental disorder   • Autism spectrum disorder   • Cyst of ovary   • Pelvic pain   • Nephrolithiasis   • Nexplanon in place   • Seizure disorder (CMS/HCC)       Allergy:   No Known Allergies     Current Medications:    Current Outpatient Medications   Medication Sig Dispense Refill   • acetaminophen (TYLENOL) 500 MG tablet Take 1-2 tablets by mouth every 6 hours as needed for pain 60 tablet 12   • cloNIDine (Catapres) 0.3 MG tablet Take 1 tablet by mouth Every Night. 30 tablet 5   • diazePAM (VALIUM) 10 MG tablet Take 1 tablet by mouth 2 (Two) Times a Day As Needed for anxiety 60 tablet 1   • diazePAM (VALIUM) 10 MG tablet Take 1 tablet by mouth 2 (Two) Times a Day As Needed. Max of 2 tablets per day. 60 tablet 2   • diazePAM (VALIUM) 10 MG tablet Take 1 tablet by mouth 2 (Two) Times a Day As Needed for anxiety 60 tablet 1   • diazePAM, 20 MG Dose, (Valtoco 20 MG Dose) 2 x 10 MG/0.1ML liquid therapy pack Instill 1 spray into EACH nostril (1 dose= 2 spray devices) when necessary for seizures 4 each 5   • gabapentin (NEURONTIN) 600 MG tablet Take 1 tablet by mouth 3 (Three) Times a Day As Needed for pain 90 tablet 5   • ibuprofen (ADVIL,MOTRIN) 800 MG tablet Take 1 tablet by mouth Every 8 (Eight) Hours As Needed for Mild Pain. 30 tablet 0   • ibuprofen (ADVIL,MOTRIN) 800 MG tablet Take 1 tablet by mouth Every 6 (Six) Hours As Needed. 60 tablet 12   • lacosamide (Vimpat) 50 MG tablet tablet Take 5 tablets by mouth 2 (Two) Times A Day 300 tablet 5   • lamoTRIgine (LaMICtal) 100 MG tablet dispersible disintegrating tablet Place 1 tablet on the tongue 2 (Two) Times a Day. 60 tablet 11   • lamoTRIgine (LaMICtal) 25 MG disintegrating tablet Place 2 tablets on the tongue Daily. 42 tablet 1   • lamoTRIgine (LaMICtal) 50 MG tablet dispersible disintegrating tablet Place 1 tablet on the tongue 2 (Two) Times a Day. 60 tablet 5   • Midazolam (Nayzilam) 5 MG/0.1ML solution Instill 1 spray in nostril if needed for seizure, may repeat in opposite nostril in 10 minutes if needed 4 each 5   • OXcarbazepine (TRILEPTAL) 300 MG tablet Take 1 tablet by mouth Every Morning AND 2 tablets Every Night. 90 tablet 2   • oxyCODONE-acetaminophen (PERCOCET)   MG per tablet Take 1-2 tablets by mouth every 4 hours as needed for pain 30 tablet 0   • risperiDONE (RisperDAL) 2 MG tablet Take 1 tablet by mouth 2 (Two) Times a Day. 60 tablet 5     No current facility-administered medications for this visit.       Review of Symptoms:    Review of Systems   Constitutional: Negative for activity change.   HENT: Negative.    Eyes: Negative.    Respiratory: Negative.    Cardiovascular: Negative.    Gastrointestinal: Negative.    Endocrine: Negative.    Genitourinary: Negative.    Musculoskeletal: Negative.    Allergic/Immunologic: Negative.    Neurological: Positive for seizures and speech difficulty (nonverbal).   Hematological: Negative.    Psychiatric/Behavioral: Negative for agitation, behavioral problems, sleep disturbance and negative for hyperactivity. The patient is not nervous/anxious.          Physical Exam:   not currently breastfeeding.  Unable to assess, telephone visit    Appearance: Unable to assess, telephone visit  Gait, Station, Strength: Unable to assess, telephone visit    Mental Status Exam:     Mental Status exam performed 07/23/2021  and patient shows no significant changes from previous exam.     Hygiene:   Unable to assess, telephone visit  Cooperation:  apathetic, unengaged  Eye Contact:  Unable to assess, telephone visit  Psychomotor Behavior:  Unable to assess, telephone visit  Affect:  Blunted  Mood: normal  Speech:  Mute and lack of meaningful speech, makes noises  Thought Process:  Unable to demonstrate  Thought Content:  Unable to demonstrate  Suicidal:  unable to assess  Homicidal:  unable to assess  Hallucinations:  unable to assess  Delusion:  Unable to demonstrate  Memory:  Unable to evaluate  Orientation:  Unable to evaluate  Reliability:  unable to assess  Insight:  unable to assess  Judgement:  Impaired  Impulse Control:  Impaired  Physical/Medical Issues:  Yes seizure disorder       Lab Results:   No visits with results within 1  Month(s) from this visit.   Latest known visit with results is:   Admission on 03/08/2020, Discharged on 03/08/2020   Component Date Value Ref Range Status   • Glucose 03/08/2020 106* 65 - 99 mg/dL Final   • BUN 03/08/2020 10  6 - 20 mg/dL Final   • Creatinine 03/08/2020 0.76  0.57 - 1.00 mg/dL Final   • Sodium 03/08/2020 141  136 - 145 mmol/L Final   • Potassium 03/08/2020 4.1  3.5 - 5.2 mmol/L Final   • Chloride 03/08/2020 103  98 - 107 mmol/L Final   • CO2 03/08/2020 25.6  22.0 - 29.0 mmol/L Final   • Calcium 03/08/2020 9.6  8.6 - 10.5 mg/dL Final   • Total Protein 03/08/2020 7.3  6.0 - 8.5 g/dL Final   • Albumin 03/08/2020 4.00  3.50 - 5.20 g/dL Final   • ALT (SGPT) 03/08/2020 16  1 - 33 U/L Final   • AST (SGOT) 03/08/2020 13  1 - 32 U/L Final   • Alkaline Phosphatase 03/08/2020 76  39 - 117 U/L Final   • Total Bilirubin 03/08/2020 0.4  0.2 - 1.2 mg/dL Final   • eGFR  African Amer 03/08/2020 118  >60 mL/min/1.73 Final   • Globulin 03/08/2020 3.3  gm/dL Final   • A/G Ratio 03/08/2020 1.2  g/dL Final   • BUN/Creatinine Ratio 03/08/2020 13.2  7.0 - 25.0 Final   • Anion Gap 03/08/2020 12.4  5.0 - 15.0 mmol/L Final   • Lipase 03/08/2020 12* 13 - 60 U/L Final   • Color, UA 03/08/2020 Yellow  Yellow, Straw Final   • Appearance, UA 03/08/2020 Clear  Clear Final   • pH, UA 03/08/2020 <=5.0  5.0 - 8.0 Final   • Specific Gravity, UA 03/08/2020 1.027  1.005 - 1.030 Final   • Glucose, UA 03/08/2020 Negative  Negative Final   • Ketones, UA 03/08/2020 Negative  Negative Final   • Bilirubin, UA 03/08/2020 Negative  Negative Final   • Blood, UA 03/08/2020 Trace* Negative Final   • Protein, UA 03/08/2020 Negative  Negative Final   • Leuk Esterase, UA 03/08/2020 Negative  Negative Final   • Nitrite, UA 03/08/2020 Negative  Negative Final   • Urobilinogen, UA 03/08/2020 0.2 E.U./dL  0.2 - 1.0 E.U./dL Final   • Extra Tube 03/08/2020 hold for add-on   Final    Auto resulted   • Extra Tube 03/08/2020 Hold for add-ons.   Final    Auto  resulted.   • Extra Tube 03/08/2020 hold for add-on   Final    Auto resulted   • Extra Tube 03/08/2020 Hold for add-ons.   Final    Auto resulted.   • WBC 03/08/2020 5.49  3.40 - 10.80 10*3/mm3 Final   • RBC 03/08/2020 4.55  3.77 - 5.28 10*6/mm3 Final   • Hemoglobin 03/08/2020 12.7  12.0 - 15.9 g/dL Final   • Hematocrit 03/08/2020 38.5  34.0 - 46.6 % Final   • MCV 03/08/2020 84.6  79.0 - 97.0 fL Final   • MCH 03/08/2020 27.9  26.6 - 33.0 pg Final   • MCHC 03/08/2020 33.0  31.5 - 35.7 g/dL Final   • RDW 03/08/2020 12.5  12.3 - 15.4 % Final   • RDW-SD 03/08/2020 38.3  37.0 - 54.0 fl Final   • MPV 03/08/2020 11.7  6.0 - 12.0 fL Final   • Platelets 03/08/2020 226  140 - 450 10*3/mm3 Final   • Neutrophil % 03/08/2020 43.8  42.7 - 76.0 % Final   • Lymphocyte % 03/08/2020 43.7  19.6 - 45.3 % Final   • Monocyte % 03/08/2020 10.0  5.0 - 12.0 % Final   • Eosinophil % 03/08/2020 1.8  0.3 - 6.2 % Final   • Basophil % 03/08/2020 0.7  0.0 - 1.5 % Final   • Immature Grans % 03/08/2020 0.0  0.0 - 0.5 % Final   • Neutrophils, Absolute 03/08/2020 2.40  1.70 - 7.00 10*3/mm3 Final   • Lymphocytes, Absolute 03/08/2020 2.40  0.70 - 3.10 10*3/mm3 Final   • Monocytes, Absolute 03/08/2020 0.55  0.10 - 0.90 10*3/mm3 Final   • Eosinophils, Absolute 03/08/2020 0.10  0.00 - 0.40 10*3/mm3 Final   • Basophils, Absolute 03/08/2020 0.04  0.00 - 0.20 10*3/mm3 Final   • Immature Grans, Absolute 03/08/2020 0.00  0.00 - 0.05 10*3/mm3 Final   • nRBC 03/08/2020 0.0  0.0 - 0.2 /100 WBC Final   • RBC, UA 03/08/2020 0-2* None Seen /HPF Final   • WBC, UA 03/08/2020 0-2* None Seen /HPF Final   • Bacteria, UA 03/08/2020 Trace* None Seen /HPF Final   • Squamous Epithelial Cells, UA 03/08/2020 0-2  None Seen, 0-2 /HPF Final   • Hyaline Casts, UA 03/08/2020 None Seen  None Seen /LPF Final   • Mucus, UA 03/08/2020 Small/1+* None Seen, Trace /HPF Final   • Methodology 03/08/2020 Manual Light Microscopy   Final       Assessment/Plan   Diagnoses and all orders for  this visit:    1. Autism spectrum disorder requiring very substantial support (level 3) (Primary)  -     risperiDONE (RisperDAL) 2 MG tablet; Take 1 tablet by mouth 2 (Two) Times a Day.  Dispense: 60 tablet; Refill: 5  -     cloNIDine (Catapres) 0.3 MG tablet; Take 1 tablet by mouth Every Night.  Dispense: 30 tablet; Refill: 5    2. Aggression  -     risperiDONE (RisperDAL) 2 MG tablet; Take 1 tablet by mouth 2 (Two) Times a Day.  Dispense: 60 tablet; Refill: 5  -     cloNIDine (Catapres) 0.3 MG tablet; Take 1 tablet by mouth Every Night.  Dispense: 30 tablet; Refill: 5    3. Other specified anxiety disorders  -     cloNIDine (Catapres) 0.3 MG tablet; Take 1 tablet by mouth Every Night.  Dispense: 30 tablet; Refill: 5    4. Intellectual disability, profound    -Patient continues to be stable at baseline with no major symptom burden.  -Reviewed previous documentation  -Reviewed most recent available labs  -DOUGLAS reviewed and appropriate. Patient counseled on use of controlled substances.   -Continue Risperdal 2 mg twice daily for autism spectrum disorder, behavioral disturbance, and anxiety  -Continue diazepam 5-10 mg p.o. 3 times daily as needed for anxiety.  Patient does not take this every day.  -Continue clonidine 0.3 mg p.o. nightly for insomnia, behaviors, and anxiety  -Continue Gabapentin 600 mg 3 times daily for mood and pain  -Approximate appointment time 8:30 AM to 8:45 AM via telephone visit      Visit Diagnoses:    ICD-10-CM ICD-9-CM   1. Autism spectrum disorder requiring very substantial support (level 3)  F84.0 299.00   2. Aggression  R46.89 V40.39   3. Other specified anxiety disorders  F41.8 300.09   4. Intellectual disability, profound  F79 [id number]       TREATMENT PLAN/GOALS: Continue supportive psychotherapy efforts and medications as indicated. Treatment and medication options discussed during today's visit. Patient acknowledged and verbally consented to continue with current treatment plan and was  educated on the importance of compliance with treatment and follow-up appointments.    MEDICATION ISSUES:    Discussed medication options and treatment plan of prescribed medication as well as the risks, benefits, and side effects including potential falls, possible impaired driving and metabolic adversities among others. Patient is agreeable to call the office with any worsening of symptoms or onset of side effects. Patient is agreeable to call 911 or go to the nearest ER should he/she begin having SI/HI.     MEDS ORDERED DURING VISIT:  New Medications Ordered This Visit   Medications   • risperiDONE (RisperDAL) 2 MG tablet     Sig: Take 1 tablet by mouth 2 (Two) Times a Day.     Dispense:  60 tablet     Refill:  5   • cloNIDine (Catapres) 0.3 MG tablet     Sig: Take 1 tablet by mouth Every Night.     Dispense:  30 tablet     Refill:  5       Return in about 6 months (around 1/23/2022).             This document has been electronically signed by Simon Kapoor MD  July 23, 2021 11:35 EDT

## 2021-08-05 ENCOUNTER — TRANSCRIBE ORDERS (OUTPATIENT)
Dept: LAB | Facility: HOSPITAL | Age: 21
End: 2021-08-05

## 2021-08-05 ENCOUNTER — HOSPITAL ENCOUNTER (OUTPATIENT)
Dept: GENERAL RADIOLOGY | Facility: HOSPITAL | Age: 21
Discharge: HOME | End: 2021-08-05
Admitting: UROLOGY

## 2021-08-05 DIAGNOSIS — N20.0 KIDNEY STONE: Primary | ICD-10-CM

## 2021-08-05 DIAGNOSIS — N20.0 KIDNEY STONE: ICD-10-CM

## 2021-08-05 PROCEDURE — 74018 RADEX ABDOMEN 1 VIEW: CPT

## 2021-09-16 ENCOUNTER — HOSPITAL ENCOUNTER (OUTPATIENT)
Dept: GENERAL RADIOLOGY | Facility: HOSPITAL | Age: 21
Discharge: HOME | End: 2021-09-16
Admitting: UROLOGY

## 2021-09-16 ENCOUNTER — TRANSCRIBE ORDERS (OUTPATIENT)
Dept: LAB | Facility: HOSPITAL | Age: 21
End: 2021-09-16

## 2021-09-16 DIAGNOSIS — N20.0 URIC ACID NEPHROLITHIASIS: ICD-10-CM

## 2021-09-16 DIAGNOSIS — N20.0 URIC ACID NEPHROLITHIASIS: Primary | ICD-10-CM

## 2021-09-16 PROCEDURE — 74018 RADEX ABDOMEN 1 VIEW: CPT

## 2021-10-15 ENCOUNTER — TRANSCRIBE ORDERS (OUTPATIENT)
Dept: LAB | Facility: HOSPITAL | Age: 21
End: 2021-10-15

## 2021-10-15 ENCOUNTER — LAB (OUTPATIENT)
Dept: LAB | Facility: HOSPITAL | Age: 21
End: 2021-10-15

## 2021-10-15 ENCOUNTER — HOSPITAL ENCOUNTER (OUTPATIENT)
Dept: GENERAL RADIOLOGY | Facility: HOSPITAL | Age: 21
Discharge: HOME | End: 2021-10-15

## 2021-10-15 DIAGNOSIS — N20.0 KIDNEY STONE: Primary | ICD-10-CM

## 2021-10-15 DIAGNOSIS — N20.0 KIDNEY STONE: ICD-10-CM

## 2021-10-15 PROCEDURE — 74018 RADEX ABDOMEN 1 VIEW: CPT

## 2021-11-09 ENCOUNTER — TRANSCRIBE ORDERS (OUTPATIENT)
Dept: LAB | Facility: HOSPITAL | Age: 21
End: 2021-11-09

## 2021-11-09 ENCOUNTER — HOSPITAL ENCOUNTER (OUTPATIENT)
Dept: GENERAL RADIOLOGY | Facility: HOSPITAL | Age: 21
Discharge: HOME | End: 2021-11-09

## 2021-11-09 ENCOUNTER — LAB (OUTPATIENT)
Dept: LAB | Facility: HOSPITAL | Age: 21
End: 2021-11-09

## 2021-11-09 DIAGNOSIS — N30.20 BLADDER INFECTION, CHRONIC: Primary | ICD-10-CM

## 2021-11-09 DIAGNOSIS — Z13.228 SCREENING FOR PHENYLKETONURIA (PKU): ICD-10-CM

## 2021-11-09 DIAGNOSIS — N20.0 URIC ACID NEPHROLITHIASIS: ICD-10-CM

## 2021-11-09 DIAGNOSIS — Z13.220 SCREENING FOR LIPOID DISORDERS: Primary | ICD-10-CM

## 2021-11-09 DIAGNOSIS — N30.20 BLADDER INFECTION, CHRONIC: ICD-10-CM

## 2021-11-09 DIAGNOSIS — Z13.220 SCREENING FOR LIPOID DISORDERS: ICD-10-CM

## 2021-11-09 LAB
ALBUMIN SERPL-MCNC: 4.2 G/DL (ref 3.5–5.2)
ALBUMIN/GLOB SERPL: 1.4 G/DL
ALP SERPL-CCNC: 99 U/L (ref 39–117)
ALT SERPL W P-5'-P-CCNC: 29 U/L (ref 1–33)
ANION GAP SERPL CALCULATED.3IONS-SCNC: 7.3 MMOL/L (ref 5–15)
AST SERPL-CCNC: 19 U/L (ref 1–32)
BILIRUB SERPL-MCNC: 0.3 MG/DL (ref 0–1.2)
BUN SERPL-MCNC: 10 MG/DL (ref 6–20)
BUN/CREAT SERPL: 11.4 (ref 7–25)
CALCIUM SPEC-SCNC: 9.4 MG/DL (ref 8.6–10.5)
CHLORIDE SERPL-SCNC: 106 MMOL/L (ref 98–107)
CHOLEST SERPL-MCNC: 178 MG/DL (ref 0–200)
CO2 SERPL-SCNC: 23.7 MMOL/L (ref 22–29)
CREAT SERPL-MCNC: 0.88 MG/DL (ref 0.57–1)
GFR SERPL CREATININE-BSD FRML MDRD: 97 ML/MIN/1.73
GLOBULIN UR ELPH-MCNC: 3.1 GM/DL
GLUCOSE SERPL-MCNC: 99 MG/DL (ref 65–99)
HBA1C MFR BLD: 5.88 % (ref 4.8–5.6)
HDLC SERPL-MCNC: 48 MG/DL (ref 40–60)
LDLC SERPL CALC-MCNC: 115 MG/DL (ref 0–100)
LDLC/HDLC SERPL: 2.38 {RATIO}
POTASSIUM SERPL-SCNC: 4.1 MMOL/L (ref 3.5–5.2)
PROT SERPL-MCNC: 7.3 G/DL (ref 6–8.5)
SODIUM SERPL-SCNC: 137 MMOL/L (ref 136–145)
TRIGL SERPL-MCNC: 78 MG/DL (ref 0–150)
VLDLC SERPL-MCNC: 15 MG/DL (ref 5–40)

## 2021-11-09 PROCEDURE — 36415 COLL VENOUS BLD VENIPUNCTURE: CPT

## 2021-11-09 PROCEDURE — 74018 RADEX ABDOMEN 1 VIEW: CPT

## 2021-11-09 PROCEDURE — 80061 LIPID PANEL: CPT

## 2021-11-09 PROCEDURE — 83036 HEMOGLOBIN GLYCOSYLATED A1C: CPT

## 2021-11-09 PROCEDURE — 80053 COMPREHEN METABOLIC PANEL: CPT

## 2021-12-27 ENCOUNTER — HOSPITAL ENCOUNTER (OUTPATIENT)
Dept: GENERAL RADIOLOGY | Facility: HOSPITAL | Age: 21
Discharge: HOME | End: 2021-12-27

## 2021-12-27 ENCOUNTER — TRANSCRIBE ORDERS (OUTPATIENT)
Dept: LAB | Facility: HOSPITAL | Age: 21
End: 2021-12-27

## 2021-12-27 ENCOUNTER — LAB (OUTPATIENT)
Dept: LAB | Facility: HOSPITAL | Age: 21
End: 2021-12-27

## 2021-12-27 DIAGNOSIS — N30.20 BLADDER INFECTION, CHRONIC: ICD-10-CM

## 2021-12-27 DIAGNOSIS — N30.20 BLADDER INFECTION, CHRONIC: Primary | ICD-10-CM

## 2021-12-27 PROCEDURE — 74018 RADEX ABDOMEN 1 VIEW: CPT

## 2022-01-21 ENCOUNTER — TELEMEDICINE (OUTPATIENT)
Dept: PSYCHIATRY | Facility: CLINIC | Age: 22
End: 2022-01-21

## 2022-01-21 DIAGNOSIS — F41.8 OTHER SPECIFIED ANXIETY DISORDERS: ICD-10-CM

## 2022-01-21 DIAGNOSIS — F84.0 AUTISM SPECTRUM DISORDER REQUIRING VERY SUBSTANTIAL SUPPORT (LEVEL 3): ICD-10-CM

## 2022-01-21 DIAGNOSIS — R46.89 AGGRESSION: ICD-10-CM

## 2022-01-21 PROCEDURE — 99213 OFFICE O/P EST LOW 20 MIN: CPT | Performed by: PSYCHIATRY & NEUROLOGY

## 2022-01-21 RX ORDER — CLONIDINE HYDROCHLORIDE 0.3 MG/1
0.3 TABLET ORAL NIGHTLY
Qty: 30 TABLET | Refills: 5 | Status: SHIPPED | OUTPATIENT
Start: 2022-01-21 | End: 2022-07-22 | Stop reason: SDUPTHER

## 2022-01-21 RX ORDER — RISPERIDONE 2 MG/1
2 TABLET ORAL 2 TIMES DAILY
Qty: 60 TABLET | Refills: 5 | Status: SHIPPED | OUTPATIENT
Start: 2022-01-21 | End: 2022-07-22 | Stop reason: SDUPTHER

## 2022-01-25 ENCOUNTER — HOSPITAL ENCOUNTER (OUTPATIENT)
Dept: GENERAL RADIOLOGY | Facility: HOSPITAL | Age: 22
Discharge: HOME | End: 2022-01-25
Admitting: UROLOGY

## 2022-01-25 ENCOUNTER — TRANSCRIBE ORDERS (OUTPATIENT)
Dept: LAB | Facility: HOSPITAL | Age: 22
End: 2022-01-25

## 2022-01-25 DIAGNOSIS — N30.20 BLADDER INFECTION, CHRONIC: Primary | ICD-10-CM

## 2022-01-25 DIAGNOSIS — N30.20 BLADDER INFECTION, CHRONIC: ICD-10-CM

## 2022-01-25 PROCEDURE — 74018 RADEX ABDOMEN 1 VIEW: CPT

## 2022-01-28 NOTE — PROGRESS NOTES
Subjective   Eboni Hernandez is a 21 y.o. female who presents today for follow up    This provider is located at [facility] at [address] [place] [address]. The provider identified himself as well as his credentials.   The Patient is at home using her phone because problems with video connection. The patient's condition being diagnosed/treated is appropriate for telemedicine. The patient and guardian gave consent to be seen remotely, and when consent is given they understand that the consent allows for patient identifiable information to be sent to a third party as needed.   They may refuse to be seen remotely at any time. The electronic data is encrypted and password protected, and the patient has been advised of the potential risks to privacy not withstanding such measures        Chief Complaint: Intellectual disability and autism spectrum disorder    History of Present Illness: Patient continues to do well for mental health standpoint and is stable at her baseline with no major symptom burden.  Mood and anxiety symptoms are well controlled.  She is not having any major behavioral disruptions.  She is having some worsening seizure activity and has been on a new medication for 1 week which has reduced her level of seizures.  She is also having GERD and is being referred to a GI specialist.  She also is considering a breast reduction due to chronic back pain and poor posture.  No medication side effects noted to her psychotropic medications.      The following portions of the patient's history were reviewed and updated as appropriate: allergies, current medications, past family history, past medical history, past social history, past surgical history and problem list.      Past Medical History:  Past Medical History:   Diagnosis Date   • Anxiety    • Anxiety    • Autism    • Chronic pelvic pain in female    • Communication deficit    • Developmental delay    • Encopresis    •  Enuresis    • Global developmental delay    • History of kidney stones     6 WEEKS AGO   • Kidney stones    • Multiple developmental ovarian cysts    • Scoliosis    • Seizures (CMS/HCC) 2018    LAST SEIZURE WAS ONE WEEK AGO-PETITE MAL.  LAST GRAND MAL SEIZURE WAS 4-5 MONTHS AGO   • Seizures (CMS/HCC)        Social History:  Social History     Socioeconomic History   • Marital status: Single   Tobacco Use   • Smoking status: Never Smoker   • Smokeless tobacco: Never Used   Substance and Sexual Activity   • Alcohol use: No   • Drug use: No   • Sexual activity: Defer       Family History:  Family History   Problem Relation Age of Onset   • Drug abuse Mother    • Anxiety disorder Mother    • Depression Mother    • Drug abuse Father    • ADD / ADHD Neg Hx    • Alcohol abuse Neg Hx    • Bipolar disorder Neg Hx    • Dementia Neg Hx    • OCD Neg Hx    • Paranoid behavior Neg Hx    • Schizophrenia Neg Hx    • Seizures Neg Hx    • Self-Injurious Behavior  Neg Hx    • Suicide Attempts Neg Hx        Past Surgical History:  Past Surgical History:   Procedure Laterality Date   • DIAGNOSTIC LAPAROSCOPY N/A 9/11/2018    Procedure: DIAGNOSTIC LAPAROSCOPY drainage of right ovarian cyst;  Surgeon: Amol Ospina MD;  Location: [facility];  Service: Obstetrics/Gynecology   • INTRAUTERINE DEVICE INSERTION     • OTHER SURGICAL HISTORY      IUD PLACEMENT IN OR   • URETEROSCOPY LASER LITHOTRIPSY WITH STENT INSERTION  2018   • URETEROSCOPY STENT INSERTION  2020       Problem List:  Patient Active Problem List   Diagnosis   • Problems with communication   • Intellectual disability, profound   • Encopresis   • Enuresis   • Aggression   • Insomnia due to other mental disorder   • Autism spectrum disorder   • Cyst of ovary   • Pelvic pain   • Nephrolithiasis   • Nexplanon in place   • Seizure disorder ([facility])       Allergy:   No Known Allergies     Current Medications:   Current Outpatient Medications   Medication Sig Dispense Refill   •  acetaminophen (TYLENOL) 500 MG tablet Take 1-2 tablets by mouth every 6 hours as needed for pain 60 tablet 12   • Cenobamate (Xcopri) 14 x 150 MG & 14 x200 MG tablet therapy pack Pack #2:  Take 150 MG tablet once a day for 14 days then take 200 MG tablet for 14 days 28 each 0   • Cenobamate (Xcopri) 14 x 50 MG & 14 x100 MG tablet therapy pack Pack #1:  Take 50 MG tablet once a day for 14 days, then take 100 MG tablet once a day for 14 days 28 each 0   • Cenobamate (Xcopri) 200 MG tablet Maintenance Dose:  Take 1 tablet by mouth every night at bedtime. 30 tablet 5   • cloNIDine (Catapres) 0.3 MG tablet Take 1 tablet by mouth Every Night. 30 tablet 5   • diazePAM (VALIUM) 10 MG tablet Take 1 tablet by mouth 2 (Two) Times a Day As Needed for anxiety 60 tablet 1   • diazePAM (VALIUM) 10 MG tablet Take 1 tablet by mouth 2 (Two) Times a Day As Needed. Max of 2 tablets per day. 60 tablet 2   • diazePAM (VALIUM) 10 MG tablet Take 1 tablet by mouth 2 (Two) Times a Day As Needed for anxiety 60 tablet 1   • diazePAM (VALIUM) 10 MG tablet Take 1 tablet by mouth 2 (Two) Times a Day As Needed For Anxiety 60 tablet 1   • diazePAM (VALIUM) 10 MG tablet Take 1 tablet by mouth 2 (Two) Times a Day As Needed For Anxiety 60 tablet 1   • diazePAM (VALIUM) 10 MG tablet Take 1 tablet by mouth 2 (Two) Times a Day As Needed for anxiety. Max of 2 tabs per day. 60 tablet 1   • diazePAM (VALIUM) 10 MG tablet Take 1 tablet by mouth 2 (Two) Times a Day As Needed for anxiety 60 tablet 1   • diazePAM (VALIUM) 10 MG tablet Take 1 tablet by mouth 2 (Two) Times a Day As Needed for anxiety, No more than 2 tablets per day 60 tablet 1   • diazePAM, 20 MG Dose, (Valtoco 20 MG Dose) 2 x 10 MG/0.1ML liquid therapy pack Instill 1 spray into EACH nostril (1 dose= 2 spray devices) when necessary for seizures 4 each 5   • diazePAM, 20 MG Dose, (Valtoco 20 MG Dose) 2 x 10 MG/0.1ML liquid therapy pack Instill 1 spray into EACH nostril (1 dose= 2 spray devices)  when necessary for seizures 4 each 5   • gabapentin (NEURONTIN) 600 MG tablet Take 1 tablet by mouth 3 (Three) Times a Day As Needed for pain 90 tablet 5   • ibuprofen (ADVIL,MOTRIN) 800 MG tablet Take 1 tablet by mouth Every 8 (Eight) Hours As Needed for Mild Pain. 30 tablet 0   • ibuprofen (ADVIL,MOTRIN) 800 MG tablet Take 1 tablet by mouth Every 6 (Six) Hours As Needed. 60 tablet 12   • lacosamide (Vimpat) 50 MG tablet tablet Take 5 tablets by mouth 2 (Two) Times A Day 300 tablet 5   • lacosamide (Vimpat) 50 MG tablet tablet Take 3 tablets by mouth 2 (two) times a day. 180 tablet 5   • lacosamide (Vimpat) 50 MG tablet tablet Take 3 tablets by mouth 2 (Two) Times a Day. 180 tablet 5   • lamoTRIgine (LaMICtal) 100 MG tablet dispersible disintegrating tablet Place 1 tablet on the tongue 2 (Two) Times a Day. 60 tablet 11   • lamoTRIgine (LaMICtal) 25 MG disintegrating tablet Place 2 tablets on the tongue Daily. 42 tablet 1   • lamoTRIgine (LaMICtal) 50 MG tablet dispersible disintegrating tablet Place 1 tablet on the tongue 2 (Two) Times a Day. 60 tablet 5   • lamoTRIgine (LaMICtal) 50 MG tablet dispersible disintegrating tablet Place 3 tablets on the tongue 2 (two) times a day. 180 tablet 11   • Midazolam (Nayzilam) 5 MG/0.1ML solution Instill 1 spray in nostril if needed for seizure, may repeat in opposite nostril in 10 minutes if needed 4 each 5   • omeprazole (priLOSEC) 20 MG capsule Take 1 capsule by mouth Daily. 90 capsule 1   • OXcarbazepine (TRILEPTAL) 300 MG tablet Take 1 tablet by mouth Every Morning AND 2 tablets Every Night. 90 tablet 2   • oxyCODONE-acetaminophen (PERCOCET)  MG per tablet Take 1-2 tablets by mouth every 4 hours as needed for pain 30 tablet 0   • risperiDONE (RisperDAL) 2 MG tablet Take 1 tablet by mouth 2 (Two) Times a Day. 60 tablet 5     No current facility-administered medications for this visit.       Review of Symptoms:    Review of Systems   Constitutional: Negative for  activity change.   HENT: Negative.    Eyes: Negative.    Respiratory: Negative.    Cardiovascular: Negative.    Gastrointestinal: Negative.  Positive for GERD.   Endocrine: Negative.    Genitourinary: Negative.    Musculoskeletal: Negative.    Allergic/Immunologic: Negative.    Neurological: Positive for seizures and speech difficulty (nonverbal).   Hematological: Negative.    Psychiatric/Behavioral: Negative for agitation, behavioral problems, sleep disturbance and negative for hyperactivity. The patient is not nervous/anxious.          Physical Exam:   not currently breastfeeding.  Unable to assess, telephone visit    Appearance: Unable to assess, telephone visit  Gait, Station, Strength: Unable to assess, telephone visit    Mental Status Exam:     Mental Status exam performed 01/21/2022  and patient shows no significant changes from previous exam.     Hygiene:   Unable to assess, telephone visit  Cooperation:  apathetic, unengaged  Eye Contact:  Unable to assess, telephone visit  Psychomotor Behavior:  Unable to assess, telephone visit  Affect:  Blunted  Mood: normal  Speech:  Mute and lack of meaningful speech, makes noises  Thought Process:  Unable to demonstrate  Thought Content:  Unable to demonstrate  Suicidal:  unable to assess  Homicidal:  unable to assess  Hallucinations:  unable to assess  Delusion:  Unable to demonstrate  Memory:  Unable to evaluate  Orientation:  Unable to evaluate  Reliability:  unable to assess  Insight:  unable to assess  Judgement:  Impaired  Impulse Control:  Impaired  Physical/Medical Issues:  Yes seizure disorder       Lab Results:   No visits with results within 1 Month(s) from this visit.   Latest known visit with results is:   Lab on 11/09/2021   Component Date Value Ref Range Status   • Total Cholesterol 11/09/2021 178  0 - 200 mg/dL Final   • Triglycerides 11/09/2021 78  0 - 150 mg/dL Final   • HDL Cholesterol 11/09/2021 48  40 - 60 mg/dL Final   • LDL Cholesterol   11/09/2021 115* 0 - 100 mg/dL Final   • VLDL Cholesterol 11/09/2021 15  5 - 40 mg/dL Final   • LDL/HDL Ratio 11/09/2021 2.38   Final   • Glucose 11/09/2021 99  65 - 99 mg/dL Final   • BUN 11/09/2021 10  6 - 20 mg/dL Final   • Creatinine 11/09/2021 0.88  0.57 - 1.00 mg/dL Final   • Sodium 11/09/2021 137  136 - 145 mmol/L Final   • Potassium 11/09/2021 4.1  3.5 - 5.2 mmol/L Final   • Chloride 11/09/2021 106  98 - 107 mmol/L Final   • CO2 11/09/2021 23.7  22.0 - 29.0 mmol/L Final   • Calcium 11/09/2021 9.4  8.6 - 10.5 mg/dL Final   • Total Protein 11/09/2021 7.3  6.0 - 8.5 g/dL Final   • Albumin 11/09/2021 4.20  3.50 - 5.20 g/dL Final   • ALT (SGPT) 11/09/2021 29  1 - 33 U/L Final   • AST (SGOT) 11/09/2021 19  1 - 32 U/L Final   • Alkaline Phosphatase 11/09/2021 99  39 - 117 U/L Final   • Total Bilirubin 11/09/2021 0.3  0.0 - 1.2 mg/dL Final   • eGFR   Amer 11/09/2021 97  >60 mL/min/1.73 Final   • Globulin 11/09/2021 3.1  gm/dL Final   • A/G Ratio 11/09/2021 1.4  g/dL Final   • BUN/Creatinine Ratio 11/09/2021 11.4  7.0 - 25.0 Final   • Anion Gap 11/09/2021 7.3  5.0 - 15.0 mmol/L Final   • Hemoglobin A1C 11/09/2021 5.88* 4.80 - 5.60 % Final       Assessment/Plan   Diagnoses and all orders for this visit:    1. Autism spectrum disorder requiring very substantial support (level 3)  -     cloNIDine (Catapres) 0.3 MG tablet; Take 1 tablet by mouth Every Night.  Dispense: 30 tablet; Refill: 5  -     risperiDONE (RisperDAL) 2 MG tablet; Take 1 tablet by mouth 2 (Two) Times a Day.  Dispense: 60 tablet; Refill: 5    2. Aggression  -     cloNIDine (Catapres) 0.3 MG tablet; Take 1 tablet by mouth Every Night.  Dispense: 30 tablet; Refill: 5  -     risperiDONE (RisperDAL) 2 MG tablet; Take 1 tablet by mouth 2 (Two) Times a Day.  Dispense: 60 tablet; Refill: 5    3. Other specified anxiety disorders  -     cloNIDine (Catapres) 0.3 MG tablet; Take 1 tablet by mouth Every Night.  Dispense: 30 tablet; Refill: 5    -For mental  health standpoint, patient is stable at baseline no major symptom burden  -Reviewed previous documentation  -Reviewed most recent available labs  -Sent for recent laboratory studies   -DOUGLAS reviewed and appropriate. Patient counseled on use of controlled substances.   -Continue Risperdal 2 mg twice daily for autism spectrum disorder, behavioral disturbance, and anxiety  -Continue diazepam 5-10 mg p.o. 3 times daily as needed for anxiety.  Patient does not take this every day.  -Continue clonidine 0.3 mg p.o. nightly for insomnia, behaviors, and anxiety  -Continue Gabapentin 800 mg 3 times daily for mood and pain  -Approximate appointment time 9:30 AM to 9:45 AM via telephone visit      Visit Diagnoses:    ICD-10-CM ICD-9-CM   1. Autism spectrum disorder requiring very substantial support (level 3)  F84.0 299.00   2. Aggression  R46.89 V40.39   3. Other specified anxiety disorders  F41.8 300.09       TREATMENT PLAN/GOALS: Continue supportive psychotherapy efforts and medications as indicated. Treatment and medication options discussed during today's visit. Patient acknowledged and verbally consented to continue with current treatment plan and was educated on the importance of compliance with treatment and follow-up appointments.    MEDICATION ISSUES:    Discussed medication options and treatment plan of prescribed medication as well as the risks, benefits, and side effects including potential falls, possible impaired driving and metabolic adversities among others. Patient is agreeable to call the office with any worsening of symptoms or onset of side effects. Patient is agreeable to call 911 or go to the nearest ER should he/she begin having SI/HI.     MEDS ORDERED DURING VISIT:  New Medications Ordered This Visit   Medications   • cloNIDine (Catapres) 0.3 MG tablet     Sig: Take 1 tablet by mouth Every Night.     Dispense:  30 tablet     Refill:  5   • risperiDONE (RisperDAL) 2 MG tablet     Sig: Take 1 tablet by  mouth 2 (Two) Times a Day.     Dispense:  60 tablet     Refill:  5       Return in about 6 months (around 7/21/2022).             This document has been electronically signed by Simon Kapoor MD  January 28, 2022 12:46 EST

## 2022-01-31 ENCOUNTER — OFFICE VISIT (OUTPATIENT)
Dept: GASTROENTEROLOGY | Facility: CLINIC | Age: 22
End: 2022-01-31

## 2022-01-31 ENCOUNTER — LAB (OUTPATIENT)
Dept: LAB | Facility: HOSPITAL | Age: 22
End: 2022-01-31

## 2022-01-31 VITALS
WEIGHT: 274 LBS | HEIGHT: 67 IN | DIASTOLIC BLOOD PRESSURE: 60 MMHG | SYSTOLIC BLOOD PRESSURE: 120 MMHG | TEMPERATURE: 98.2 F | BODY MASS INDEX: 43 KG/M2

## 2022-01-31 DIAGNOSIS — F79 INTELLECTUAL DISABILITY: Chronic | ICD-10-CM

## 2022-01-31 DIAGNOSIS — R13.10 DYSPHAGIA, UNSPECIFIED TYPE: ICD-10-CM

## 2022-01-31 DIAGNOSIS — K21.9 GASTROESOPHAGEAL REFLUX DISEASE WITHOUT ESOPHAGITIS: Primary | ICD-10-CM

## 2022-01-31 DIAGNOSIS — R11.0 NAUSEA: ICD-10-CM

## 2022-01-31 DIAGNOSIS — K21.9 GASTROESOPHAGEAL REFLUX DISEASE WITHOUT ESOPHAGITIS: ICD-10-CM

## 2022-01-31 DIAGNOSIS — K59.00 CONSTIPATION, UNSPECIFIED CONSTIPATION TYPE: ICD-10-CM

## 2022-01-31 LAB
ALBUMIN SERPL-MCNC: 4.3 G/DL (ref 3.5–5.2)
ALBUMIN/GLOB SERPL: 1.3 G/DL
ALP SERPL-CCNC: 103 U/L (ref 39–117)
ALT SERPL W P-5'-P-CCNC: 28 U/L (ref 1–33)
ANION GAP SERPL CALCULATED.3IONS-SCNC: 9.5 MMOL/L (ref 5–15)
AST SERPL-CCNC: 21 U/L (ref 1–32)
BASOPHILS # BLD AUTO: 0.06 10*3/MM3 (ref 0–0.2)
BASOPHILS NFR BLD AUTO: 0.9 % (ref 0–1.5)
BILIRUB SERPL-MCNC: 0.3 MG/DL (ref 0–1.2)
BUN SERPL-MCNC: 9 MG/DL (ref 6–20)
BUN/CREAT SERPL: 10.2 (ref 7–25)
CALCIUM SPEC-SCNC: 10 MG/DL (ref 8.6–10.5)
CHLORIDE SERPL-SCNC: 103 MMOL/L (ref 98–107)
CO2 SERPL-SCNC: 23.5 MMOL/L (ref 22–29)
CREAT SERPL-MCNC: 0.88 MG/DL (ref 0.57–1)
DEPRECATED RDW RBC AUTO: 41 FL (ref 37–54)
EOSINOPHIL # BLD AUTO: 0.1 10*3/MM3 (ref 0–0.4)
EOSINOPHIL NFR BLD AUTO: 1.5 % (ref 0.3–6.2)
ERYTHROCYTE [DISTWIDTH] IN BLOOD BY AUTOMATED COUNT: 13.1 % (ref 12.3–15.4)
GFR SERPL CREATININE-BSD FRML MDRD: 97 ML/MIN/1.73
GLOBULIN UR ELPH-MCNC: 3.3 GM/DL
GLUCOSE SERPL-MCNC: 74 MG/DL (ref 65–99)
HCT VFR BLD AUTO: 42.8 % (ref 34–46.6)
HGB BLD-MCNC: 13.5 G/DL (ref 12–15.9)
IMM GRANULOCYTES # BLD AUTO: 0.01 10*3/MM3 (ref 0–0.05)
IMM GRANULOCYTES NFR BLD AUTO: 0.1 % (ref 0–0.5)
LYMPHOCYTES # BLD AUTO: 2.23 10*3/MM3 (ref 0.7–3.1)
LYMPHOCYTES NFR BLD AUTO: 32.6 % (ref 19.6–45.3)
MCH RBC QN AUTO: 27.1 PG (ref 26.6–33)
MCHC RBC AUTO-ENTMCNC: 31.5 G/DL (ref 31.5–35.7)
MCV RBC AUTO: 85.9 FL (ref 79–97)
MONOCYTES # BLD AUTO: 0.66 10*3/MM3 (ref 0.1–0.9)
MONOCYTES NFR BLD AUTO: 9.6 % (ref 5–12)
NEUTROPHILS NFR BLD AUTO: 3.79 10*3/MM3 (ref 1.7–7)
NEUTROPHILS NFR BLD AUTO: 55.3 % (ref 42.7–76)
NRBC BLD AUTO-RTO: 0 /100 WBC (ref 0–0.2)
PLATELET # BLD AUTO: 309 10*3/MM3 (ref 140–450)
PMV BLD AUTO: 11.9 FL (ref 6–12)
POTASSIUM SERPL-SCNC: 4.2 MMOL/L (ref 3.5–5.2)
PROT SERPL-MCNC: 7.6 G/DL (ref 6–8.5)
RBC # BLD AUTO: 4.98 10*6/MM3 (ref 3.77–5.28)
SODIUM SERPL-SCNC: 136 MMOL/L (ref 136–145)
WBC NRBC COR # BLD: 6.85 10*3/MM3 (ref 3.4–10.8)

## 2022-01-31 PROCEDURE — 99204 OFFICE O/P NEW MOD 45 MIN: CPT | Performed by: INTERNAL MEDICINE

## 2022-01-31 PROCEDURE — 80053 COMPREHEN METABOLIC PANEL: CPT

## 2022-01-31 PROCEDURE — 85025 COMPLETE CBC W/AUTO DIFF WBC: CPT

## 2022-01-31 PROCEDURE — 36415 COLL VENOUS BLD VENIPUNCTURE: CPT

## 2022-01-31 RX ORDER — CALCIUM CARBONATE 200(500)MG
1 TABLET,CHEWABLE ORAL DAILY
COMMUNITY
End: 2023-01-17

## 2022-01-31 RX ORDER — ONDANSETRON 4 MG/1
4 TABLET, FILM COATED ORAL EVERY 12 HOURS PRN
Qty: 30 TABLET | Refills: 1 | Status: SHIPPED | OUTPATIENT
Start: 2022-01-31

## 2022-01-31 RX ORDER — SODIUM CHLORIDE 9 MG/ML
70 INJECTION, SOLUTION INTRAVENOUS CONTINUOUS PRN
Status: CANCELLED | OUTPATIENT
Start: 2022-01-31

## 2022-01-31 NOTE — PATIENT INSTRUCTIONS
Low-Fiber Eating Plan  Fiber is found in fruits, vegetables, whole grains, and beans. Eating a diet low in fiber helps to reduce how often you have bowel movements and the amount of stool you produce. A low-fiber eating plan may help your digestive system heal if you:  · Have certain conditions, such as Crohn's disease, diverticulitis, or irritable bowel syndrome (IBS), and are having a flare-up.  · Recently had radiation therapy on your pelvis or bowel.  · Recently had intestinal surgery.  · Have a new surgical opening in your abdomen (colostomy or ileostomy).  · Have an intestine that has narrowed (stricture).  Your health care provider will tell you how long to stay on this diet and may recommend that you work with a dietitian.  What are tips for following this plan?  Reading food labels    · Check the nutrition facts label on food products for the amount of dietary fiber.  · Choose foods that have less than 2 grams (g) of fiber per serving.    Cooking  · Use white flour for baking and cooking.  · Cook meat using methods that keep it tender, such as braising or poaching.  · Cook eggs until the yolk is completely solid.  · Cook with healthy oils, such as olive oil or canola oil.  Meal planning  · Eat 5-6 small meals throughout the day instead of 3 large meals.  · If you are lactose intolerant:  ? Choose low-lactose dairy foods.  ? Do not eat dairy foods if told by your health care provider or dietitian.  · Limit fats and oils to less than 8 teaspoons (39 mL) a day.  · Eat small portions of desserts.  · Limit acidic, spicy, or fried foods to reduce gas, bloating, and discomfort.  General information  · Follow instructions from your health care provider or dietitian about how much fiber you should have each day.  · Most people on a low-fiber eating plan should eat less than 10 g of fiber a day. Your daily fiber goal is _________________ g.  · Take vitamin and mineral supplements as told by your health care provider  or dietitian. Chewable or liquid forms are best when on this eating plan. A gummy vitamin is not recommended.  What foods should I eat?  Fruits  Soft-cooked or canned fruits without skin and seeds. Ripe banana. Applesauce. Fruit juice without pulp.  Vegetables  Well-cooked or canned vegetables without skin, seeds, or stems. Cooked potatoes without skins. Vegetable juice.  Grains  All bread and crackers made with white flour. Waffles, pancakes, and [place] toast. Bagels. Pretzels. [place] toast, zwieback, and matzoh. Cooked and dried cereals that do not have whole grains, added fiber, seeds, or dried fruit. [place]. Hot and cold cereals made with refined corn, rice, or oats. Plain pasta and noodles. White rice.  Meats and other proteins  Ground meat. Tender cuts of meat or poultry. Eggs. Fish, seafood, and shellfish. Smooth nut butters. Tofu.  Dairy  All milk products and drinks. Lactose-free milk, including rice, soy, and almond milk. Yogurt without fruit, nuts, chocolate, or granola mixed in. Sour cream. Cottage cheese. Cheese.  Fats and oils  Olive oil, canola oil, sunflower oil, flaxseed oil, avocado oil, and grapeseed oil. Mayonnaise. Cream cheese. Margarine. Butter.  Beverages  Decaf coffee. Fruit and vegetable juices. Smoothies (in small amounts, with no pulp or skins, and with fruits from the recommended list). Sports drinks. Herbal tea. Water.  Sweets and desserts  Plain cakes. Cookies. Cream pies and pies made with recommended fruits. Pudding. Custard. Fruit gelatin. Sherbet. Ice pops. Ice cream without nuts. Hard candy. Honey. Jelly. Molasses. Syrups. Chocolate. Marshmallows. Gumdrops.  Seasonings and condiments  Ketchup. Mild mustard. Mild salad dressings. Plain gravies. Vinegar. Spices in moderation. Salt. Sugar.  Other foods  Bouillon. Broth. Cream and strained soups made from recommended foods. Casseroles made with recommended foods.  The items listed above may not be a complete list of foods and beverages  you can eat. Contact a dietitian for more information.  What foods should I avoid?  Fruits  Raw or dried fruit. Berries. Fruit juice with pulp. Prune juice.  Vegetables  Potato skins. Raw or undercooked vegetables. All beans and bean sprouts. Cooked greens. Corn. Peas. Cabbage. Beets. Broccoli. [place] sprouts. Cauliflower. Mushrooms. Onions. Peppers. Parsnips. Okra. Sauerkraut.  Grains  Whole-wheat, whole-grain, or multigrain breads, cereals, or crackers. Rye bread. Cereals with nuts, raisins, or coconut. Bran. Granola. High-fiber cereals. Cornmeal or corn bread. Whole-grain pasta. Wild or brown rice. Quinoa. Popcorn. Buckwheat. Wheat germ.  Meats and other proteins  Tough, fibrous meats with gristle. Fatty meat. Poultry with skin. Fried meat, poultry, or fish. Precooked or cured meat, such as sausages or meat loaves. [place]. Hot dogs. Nuts and chunky nut butter. Dried peas, beans, and lentils. Hummus.  Dairy  Yogurt with fruit, nuts, chocolate, or granola mixed in. Full-fat dairy such as whole milk, ice cream, or sour cream.  Beverages  Caffeinated coffee and teas.  Fats and oils  Avocado. Coconut. Butter.  Sweets and desserts  Desserts, cookies, or candies that contain nuts or coconut. Dried fruit. Jams and preserves with seeds. Marmalade. Any dessert made with fruits or grains that are not recommended.  Seasonings and condiments  Relish. Horseradish. Pickles. Olives.  Other foods  Corn tortilla chips. Soups made with vegetables or grains that are not recommended.  The items listed above may not be a complete list of foods and beverages you should avoid. Contact a dietitian for more information.  Summary  · Most people on a low-fiber eating plan should eat less than 10 grams of fiber a day. Follow recommendations from your health care provider or dietitian about how much fiber you should have each day.  · Always check nutrition facts labels to see the dietary fiber amount in packaged foods. A low-fiber food will  have less than 2 grams of fiber per serving.  · Try to avoid whole grains, raw fruits and vegetables, dried fruit, tough cuts of meat, nuts, and seeds.  · Take a vitamin and mineral supplement as told by your health care provider or dietitian.  This information is not intended to replace advice given to you by your health care provider. Make sure you discuss any questions you have with your health care provider.  Document Revised: 04/22/2021 Document Reviewed: 04/22/2021  Elsevier Patient Education © 2021 [employer]

## 2022-01-31 NOTE — PROGRESS NOTES
New Patient Consult      Date: 2022   Patient Name: Eboni Hernandez  MRN: [id number]  : 2000     Referring Physician: Aye Herrera A*    Chief Complaint   Patient presents with   • Heartburn       History of Present Illness: Eboni Hernandez is a 21 y.o. female who is here today to establish care with Gastroenterology for evaluation of acid reflux and heartburn.  Patient has a history of autism and significant intellectual disability, non verbal and seizure disorder.   Her care taker, Aunt says that she has been choking with food and excessive burping since about 2021. She feels that food is not going down. She was started on prilosec 20 mg po daily and was increased to BID since couple of months that is not improving her symptoms. She has nausea and retching.  It is unclear whether she has any abdominal pain as patient is nonverbal.    Her seizure under control as per care taker. She has chronic constipation and takes miralax as needed. She takes percocet as needed.   There is no history of anemia. No prior history of EGD or colonoscopy. No family history of colon cancer. History of stomach cancer remote family member. No history of any abdominal surgery. Denies alcohol abuse or cigarette smoking.     Patient is also on chronic opioids Percocet. She has history of kidney stones.   CT abdomen pelvis done in May 2021 was unremarkable.  CBC CMP was unremarkable 2021    Subjective      Past Medical History:   Past Medical History:   Diagnosis Date   • Anxiety    • Anxiety    • Autism    • Chronic pelvic pain in female    • Communication deficit    • Developmental delay    • Encopresis    • Enuresis    • Global developmental delay    • History of kidney stones     6 WEEKS AGO   • Kidney stones    • Multiple developmental ovarian cysts    • Scoliosis    • Seizures (HCC) 2018    LAST SEIZURE WAS ONE WEEK AGO-PETITE MAL.  LAST GRAND MAL SEIZURE WAS 4-5 MONTHS AGO   • Seizures  (HCC)        Past Surgical History:   Past Surgical History:   Procedure Laterality Date   • DIAGNOSTIC LAPAROSCOPY N/A 9/11/2018    Procedure: DIAGNOSTIC LAPAROSCOPY drainage of right ovarian cyst;  Surgeon: Amol Ospina MD;  Location: [facility];  Service: Obstetrics/Gynecology   • INTRAUTERINE DEVICE INSERTION     • OTHER SURGICAL HISTORY      IUD PLACEMENT IN OR   • URETEROSCOPY LASER LITHOTRIPSY WITH STENT INSERTION  2018   • URETEROSCOPY STENT INSERTION  2020       Family History:   Family History   Problem Relation Age of Onset   • Drug abuse Mother    • Anxiety disorder Mother    • Depression Mother    • Drug abuse Father    • ADD / ADHD Neg Hx    • Alcohol abuse Neg Hx    • Bipolar disorder Neg Hx    • Dementia Neg Hx    • OCD Neg Hx    • Paranoid behavior Neg Hx    • Schizophrenia Neg Hx    • Seizures Neg Hx    • Self-Injurious Behavior  Neg Hx    • Suicide Attempts Neg Hx        Social History:   Social History     Socioeconomic History   • Marital status: Single   Tobacco Use   • Smoking status: Never Smoker   • Smokeless tobacco: Never Used   Substance and Sexual Activity   • Alcohol use: No   • Drug use: No   • Sexual activity: Defer         Current Outpatient Medications:   •  calcium carbonate (TUMS) 500 MG chewable tablet, Chew 1 tablet Daily., Disp: , Rfl:   •  Cenobamate (Xcopri) 200 MG tablet, Maintenance Dose:  Take 1 tablet by mouth every night at bedtime., Disp: 30 tablet, Rfl: 5  •  cloNIDine (Catapres) 0.3 MG tablet, Take 1 tablet by mouth Every Night., Disp: 30 tablet, Rfl: 5  •  diazePAM (VALIUM) 10 MG tablet, Take 1 tablet by mouth 2 (Two) Times a Day As Needed for anxiety, No more than 2 tablets per day, Disp: 60 tablet, Rfl: 1  •  gabapentin (NEURONTIN) 600 MG tablet, Take 1 tablet by mouth 3 (Three) Times a Day As Needed for pain, Disp: 90 tablet, Rfl: 5  •  lacosamide (Vimpat) 50 MG tablet tablet, Take 3 tablets by mouth 2 (Two) Times a Day., Disp: 180 tablet, Rfl: 5  •   "lamoTRIgine (LaMICtal) 100 MG tablet dispersible disintegrating tablet, Place 1 tablet on the tongue 2 (Two) Times a Day., Disp: 60 tablet, Rfl: 11  •  lamoTRIgine (LaMICtal) 50 MG tablet dispersible disintegrating tablet, Place 3 tablets on the tongue 2 (two) times a day., Disp: 180 tablet, Rfl: 11  •  omeprazole (priLOSEC) 20 MG capsule, Take 1 capsule by mouth Daily. (Patient taking differently: Take 20 mg by mouth 2 (Two) Times a Day.), Disp: 90 capsule, Rfl: 1  •  oxyCODONE-acetaminophen (PERCOCET)  MG per tablet, Take 1-2 tablets by mouth every 4 hours as needed for pain, Disp: 30 tablet, Rfl: 0  •  risperiDONE (RisperDAL) 2 MG tablet, Take 1 tablet by mouth 2 (Two) Times a Day., Disp: 60 tablet, Rfl: 5  •  Midazolam (Nayzilam) 5 MG/0.1ML solution, Instill 1 spray in nostril if needed for seizure, may repeat in opposite nostril in 10 minutes if needed, Disp: 4 each, Rfl: 5  •  OXcarbazepine (TRILEPTAL) 300 MG tablet, Take 1 tablet by mouth Every Morning AND 2 tablets Every Night., Disp: 90 tablet, Rfl: 2    No Known Allergies    Review of Systems:   Review of Systems   Constitutional: Negative for appetite change, fatigue, fever and unexpected weight loss.   HENT: Positive for trouble swallowing.    Gastrointestinal: Positive for nausea, vomiting and GERD. Negative for abdominal distention, abdominal pain, anal bleeding, blood in stool, constipation, diarrhea, rectal pain and indigestion.       The following portions of the patient's history were reviewed and updated as appropriate: allergies, current medications, past family history, past medical history, past social history, past surgical history and problem list.    Objective     Physical Exam:  Vital Signs:   Vitals:    01/31/22 1059   BP: 120/60   Temp: 98.2 °F (36.8 °C)   TempSrc: Infrared   Weight: 124 kg (274 lb)   Height: 170.2 cm (67\")       Physical Exam  Constitutional:       Appearance: She is obese.   HENT:      Head: Normocephalic and " atraumatic.   Eyes:      Conjunctiva/sclera: Conjunctivae normal.   Abdominal:      General: Abdomen is flat. There is no distension.      Palpations: There is no mass.      Tenderness: There is no abdominal tenderness (Minimal epigastric discomfort suspect). There is no guarding or rebound.      Hernia: No hernia is present.   Musculoskeletal:      Cervical back: Normal range of motion and neck supple.   Neurological:      Mental Status: She is alert.           Results Review:   I have reviewed the patient's new clinical and imaging results and agree with the interpretation.     Lab on 11/09/2021   Component Date Value Ref Range Status   • Total Cholesterol 11/09/2021 178  0 - 200 mg/dL Final   • Triglycerides 11/09/2021 78  0 - 150 mg/dL Final   • HDL Cholesterol 11/09/2021 48  40 - 60 mg/dL Final   • LDL Cholesterol  11/09/2021 115* 0 - 100 mg/dL Final   • VLDL Cholesterol 11/09/2021 15  5 - 40 mg/dL Final   • LDL/HDL Ratio 11/09/2021 2.38   Final   • Glucose 11/09/2021 99  65 - 99 mg/dL Final   • BUN 11/09/2021 10  6 - 20 mg/dL Final   • Creatinine 11/09/2021 0.88  0.57 - 1.00 mg/dL Final   • Sodium 11/09/2021 137  136 - 145 mmol/L Final   • Potassium 11/09/2021 4.1  3.5 - 5.2 mmol/L Final   • Chloride 11/09/2021 106  98 - 107 mmol/L Final   • CO2 11/09/2021 23.7  22.0 - 29.0 mmol/L Final   • Calcium 11/09/2021 9.4  8.6 - 10.5 mg/dL Final   • Total Protein 11/09/2021 7.3  6.0 - 8.5 g/dL Final   • Albumin 11/09/2021 4.20  3.50 - 5.20 g/dL Final   • ALT (SGPT) 11/09/2021 29  1 - 33 U/L Final   • AST (SGOT) 11/09/2021 19  1 - 32 U/L Final   • Alkaline Phosphatase 11/09/2021 99  39 - 117 U/L Final   • Total Bilirubin 11/09/2021 0.3  0.0 - 1.2 mg/dL Final   • eGFR   Amer 11/09/2021 97  >60 mL/min/1.73 Final   • Globulin 11/09/2021 3.1  gm/dL Final   • A/G Ratio 11/09/2021 1.4  g/dL Final   • BUN/Creatinine Ratio 11/09/2021 11.4  7.0 - 25.0 Final   • Anion Gap 11/09/2021 7.3  5.0 - 15.0 mmol/L Final   •  Hemoglobin A1C 11/09/2021 5.88* 4.80 - 5.60 % Final      XR Abdomen KUB    Result Date: 1/25/2022  Unremarkable exam.    This report was finalized on 1/25/2022 11:45 AM by Cristina Graham M.D..    XR Abdomen KUB    Result Date: 12/27/2021  Unremarkable exam.    This report was finalized on 12/27/2021 8:51 AM by Cristina Graham M.D..    XR Abdomen KUB    Result Date: 11/9/2021  Unremarkable exam.    This report was finalized on 11/9/2021 9:21 AM by Cristina Graham M.D..      Assessment / Plan      Assessment & Plan:  1. Gastroesophageal reflux disease without esophagitis  2. Dysphagia, unspecified type  3. Nausea  As patient is nonverbal it is very difficult to interpret what symptoms patient has.  However as per caretaker she seems to have a difficulty in swallowing and excessive burping and dry heaves.  She may have a significant esophageal reflux and may even have a erosive esophagitis causing dysphagia.  She may also have a hiatal hernia with esophageal ring causing dysphagia.    It is also possible patient may have some upper abdominal pain and gallstones need to be ruled out.  H. pylori need to be ruled out.  She does have a constipation and nausea.  Given her obesity some element of irritable bowel cannot be ruled out.    Patient currently on Prilosec 20 g p.o. twice daily without much symptom relief  I have advised low fiber small meals for now  We will start her on a Zofran as needed for her nausea  We will get a basic CBC CMP and a celiac serology.   Ultrasound abdomen to rule out gallstones    We will schedule for an EGD for further evaluation    The indications, technique, alternatives and potential risk and complications were discussed with the patient including but not limited to bleeding, bowel perforations, missing lesions and anesthetic complications. The patient understands and wishes to proceed with the procedure and has given their verbal consent. Written patient education information was  given to the patient.   The patient will call if they have further questions before procedure.       4. Constipation, unspecified constipation type  Patient has a chronic constipation.  Her pain medication contributing to her some of the symptoms.  She is taking MiraLAX 17 g p.o. daily as needed with at least 1 bowel movement daily we will continue the same    5. Intellectual disability, profound  Patient is nonverbal.  Patient is from caretaker      Patient has a prior history of renal stones.  Unclear why the patient is getting every monthly x-rays        Follow Up:   No follow-ups on file.    Salvador Becerra MD  [facility] [place]  1/31/2022  11:31 EST    Please note that portions of this note may have been completed with a voice recognition program.

## 2022-02-03 ENCOUNTER — HOSPITAL ENCOUNTER (OUTPATIENT)
Dept: ULTRASOUND IMAGING | Facility: HOSPITAL | Age: 22
Discharge: HOME | End: 2022-02-03

## 2022-02-03 ENCOUNTER — PATIENT ROUNDING (BHMG ONLY) (OUTPATIENT)
Dept: GASTROENTEROLOGY | Facility: CLINIC | Age: 22
End: 2022-02-03

## 2022-02-03 ENCOUNTER — LAB (OUTPATIENT)
Dept: LAB | Facility: HOSPITAL | Age: 22
End: 2022-02-03

## 2022-02-03 DIAGNOSIS — Z01.818 PREOP TESTING: Primary | ICD-10-CM

## 2022-02-03 DIAGNOSIS — Z01.818 PREOP TESTING: ICD-10-CM

## 2022-02-03 DIAGNOSIS — R11.0 NAUSEA: ICD-10-CM

## 2022-02-03 DIAGNOSIS — K21.9 GASTROESOPHAGEAL REFLUX DISEASE WITHOUT ESOPHAGITIS: ICD-10-CM

## 2022-02-03 DIAGNOSIS — R13.10 DYSPHAGIA, UNSPECIFIED TYPE: ICD-10-CM

## 2022-02-03 LAB
BACTERIA UR QL AUTO: ABNORMAL /HPF
BILIRUB UR QL STRIP: NEGATIVE
CLARITY UR: CLEAR
COLOR UR: YELLOW
GLUCOSE UR STRIP-MCNC: NEGATIVE MG/DL
HGB UR QL STRIP.AUTO: NEGATIVE
HYALINE CASTS UR QL AUTO: ABNORMAL /LPF
KETONES UR QL STRIP: NEGATIVE
LEUKOCYTE ESTERASE UR QL STRIP.AUTO: NEGATIVE
MUCOUS THREADS URNS QL MICRO: ABNORMAL /HPF
NITRITE UR QL STRIP: NEGATIVE
PH UR STRIP.AUTO: 6 [PH] (ref 5–8)
PROT UR QL STRIP: NEGATIVE
RBC # UR STRIP: ABNORMAL /HPF
REF LAB TEST METHOD: ABNORMAL
SP GR UR STRIP: 1.02 (ref 1–1.03)
SQUAMOUS #/AREA URNS HPF: ABNORMAL /HPF
UROBILINOGEN UR QL STRIP: NORMAL
WBC # UR STRIP: ABNORMAL /HPF

## 2022-02-03 PROCEDURE — 81001 URINALYSIS AUTO W/SCOPE: CPT

## 2022-02-03 PROCEDURE — 76700 US EXAM ABDOM COMPLETE: CPT

## 2022-02-03 PROCEDURE — U0005 INFEC AGEN DETEC AMPLI PROBE: HCPCS

## 2022-02-03 PROCEDURE — U0004 COV-19 TEST NON-CDC HGH THRU: HCPCS

## 2022-02-03 PROCEDURE — C9803 HOPD COVID-19 SPEC COLLECT: HCPCS

## 2022-02-03 NOTE — PROGRESS NOTES
February 3, 2022    Hello, may I speak with [place]?    My name is Romy    I am  with [facility]  [address]  [place] [address] [phone].    Before we get started may I verify your date of birth? 2000    I am calling to officially welcome you to our practice and ask about your recent visit. Is this a good time to talk? Yes pt family member - pt is non verbal    Tell me about your visit with us. What things went well?  everyone was so nice     We're always looking for ways to make our patients' experiences even better. Do you have recommendations on ways we may improve?  no    Overall were you satisfied with your first visit to our practice? yes       I appreciate you taking the time to speak with me today. Is there anything else I can do for you? Share with provider patient is making painful screams and wanted to share with provider to see if procedure can be moved up. I told her I would share this information with the provider.      Thank you, and have a great day.

## 2022-02-04 LAB — SARS-COV-2 RNA NOSE QL NAA+PROBE: NOT DETECTED

## 2022-02-04 RX ORDER — POLYETHYLENE GLYCOL 3350 17 G/17G
17 POWDER, FOR SOLUTION ORAL DAILY
COMMUNITY

## 2022-02-04 NOTE — PRE-PROCEDURE INSTRUCTIONS
PAT phone history completed with pt for upcoming procedure on      PAT PASS GIVEN/REVIEWED WITH PT.  VERBALIZED UNDERSTANDING OF THE FOLLOWING:  DO NOT EAT, DRINK, SMOKE, USE SMOKELESS TOBACCO OR CHEW GUM AFTER MIDNIGHT THE NIGHT BEFORE SURGERY.  THIS ALSO INCLUDES HARD CANDIES AND MINTS.    DO NOT SHAVE THE AREA TO BE OPERATED ON AT LEAST 48 HOURS PRIOR TO THE PROCEDURE.  DO NOT WEAR MAKE UP OR NAIL POLISH.  DO NOT LEAVE IN ANY PIERCING OR WEAR JEWELRY THE DAY OF SURGERY.      DO NOT USE ADHESIVES IF YOU WEAR DENTURES.    DO NOT WEAR EYE CONTACTS; BRING IN YOUR GLASSES.    ONLY TAKE MEDICATION THE MORNING OF YOUR PROCEDURE IF INSTRUCTED BY YOUR SURGEON WITH ENOUGH WATER TO SWALLOW THE MEDICATION.  IF YOUR SURGEON DID NOT SPECIFY WHICH MEDICATIONS TO TAKE, YOU WILL NEED TO CALL THEIR OFFICE FOR FURTHER INSTRUCTIONS AND DO AS THEY INSTRUCT.    LEAVE ANYTHING YOU CONSIDER VALUABLE AT HOME.    YOU WILL NEED TO ARRANGE FOR SOMEONE TO DRIVE YOU HOME AFTER SURGERY.  IT IS RECOMMENDED THAT YOU DO NOT DRIVE, WORK, DRINK ALCOHOL OR MAKE MAJOR DECISIONS FOR AT LEAST 24 HOURS AFTER YOUR PROCEDURE IS COMPLETE.      THE DAY OF YOUR PROCEDURE, BRING IN THE FOLLOWING IF APPLICABLE:   PICTURE ID AND INSURANCE/MEDICARE OR MEDICAID CARDS/ANY CO-PAY THAT MAY BE DUE   COPY OF ADVANCED DIRECTIVE/LIVING WILL/POWER OR    CPAP/BIPAP/INHALERS   SKIN PREP SHEET   YOUR PREADMISSION TESTING PASS (IF NOT A PHONE HISTORY)           COVID self-quarantine instructions reviewed with the pt.  Verbalized understanding.

## 2022-02-07 ENCOUNTER — HOSPITAL ENCOUNTER (OUTPATIENT)
Facility: HOSPITAL | Age: 22
Setting detail: HOSPITAL OUTPATIENT SURGERY
Discharge: HOME | End: 2022-02-07
Attending: INTERNAL MEDICINE | Admitting: INTERNAL MEDICINE

## 2022-02-07 ENCOUNTER — ANESTHESIA EVENT (OUTPATIENT)
Dept: GASTROENTEROLOGY | Facility: HOSPITAL | Age: 22
End: 2022-02-07

## 2022-02-07 ENCOUNTER — ANESTHESIA (OUTPATIENT)
Dept: GASTROENTEROLOGY | Facility: HOSPITAL | Age: 22
End: 2022-02-07

## 2022-02-07 VITALS
DIASTOLIC BLOOD PRESSURE: 80 MMHG | BODY MASS INDEX: 43 KG/M2 | HEIGHT: 67 IN | SYSTOLIC BLOOD PRESSURE: 123 MMHG | HEART RATE: 94 BPM | OXYGEN SATURATION: 100 % | RESPIRATION RATE: 18 BRPM | WEIGHT: 274 LBS | TEMPERATURE: 97.3 F

## 2022-02-07 DIAGNOSIS — K21.9 GASTROESOPHAGEAL REFLUX DISEASE WITHOUT ESOPHAGITIS: ICD-10-CM

## 2022-02-07 DIAGNOSIS — R13.10 DYSPHAGIA, UNSPECIFIED TYPE: ICD-10-CM

## 2022-02-07 DIAGNOSIS — R11.0 NAUSEA: ICD-10-CM

## 2022-02-07 LAB — HCG SERPL QL: NEGATIVE

## 2022-02-07 PROCEDURE — 25010000002 PROPOFOL 1000 MG/100ML EMULSION: Performed by: NURSE ANESTHETIST, CERTIFIED REGISTERED

## 2022-02-07 PROCEDURE — 88305 TISSUE EXAM BY PATHOLOGIST: CPT | Performed by: INTERNAL MEDICINE

## 2022-02-07 PROCEDURE — 84703 CHORIONIC GONADOTROPIN ASSAY: CPT | Performed by: INTERNAL MEDICINE

## 2022-02-07 PROCEDURE — 43239 EGD BIOPSY SINGLE/MULTIPLE: CPT | Performed by: INTERNAL MEDICINE

## 2022-02-07 RX ORDER — KETAMINE HYDROCHLORIDE 50 MG/ML
INJECTION, SOLUTION, CONCENTRATE INTRAMUSCULAR; INTRAVENOUS AS NEEDED
Status: DISCONTINUED | OUTPATIENT
Start: 2022-02-07 | End: 2022-02-07 | Stop reason: SURG

## 2022-02-07 RX ORDER — PROPOFOL 10 MG/ML
INJECTION, EMULSION INTRAVENOUS AS NEEDED
Status: DISCONTINUED | OUTPATIENT
Start: 2022-02-07 | End: 2022-02-07 | Stop reason: SURG

## 2022-02-07 RX ORDER — SODIUM CHLORIDE 9 MG/ML
70 INJECTION, SOLUTION INTRAVENOUS CONTINUOUS PRN
Status: DISCONTINUED | OUTPATIENT
Start: 2022-02-07 | End: 2022-02-07 | Stop reason: HOSPADM

## 2022-02-07 RX ORDER — LIDOCAINE HYDROCHLORIDE 20 MG/ML
INJECTION, SOLUTION INTRAVENOUS AS NEEDED
Status: DISCONTINUED | OUTPATIENT
Start: 2022-02-07 | End: 2022-02-07 | Stop reason: SURG

## 2022-02-07 RX ADMIN — SODIUM CHLORIDE 70 ML/HR: 9 INJECTION, SOLUTION INTRAVENOUS at 07:51

## 2022-02-07 RX ADMIN — LIDOCAINE HYDROCHLORIDE 20 MG: 20 INJECTION, SOLUTION INTRAVENOUS at 08:17

## 2022-02-07 RX ADMIN — PROPOFOL 100 MG: 10 INJECTION, EMULSION INTRAVENOUS at 08:17

## 2022-02-07 RX ADMIN — KETAMINE HYDROCHLORIDE 100 MG: 50 INJECTION, SOLUTION INTRAMUSCULAR; INTRAVENOUS at 08:17

## 2022-02-07 NOTE — INTERVAL H&P NOTE
H&P reviewed. The patient was examined and there are no changes to the H&P.         Procedure/Surgery Information   [facility]     Bedside Procedure Note  Date of Service (when I performed the procedure): 12/23/2020    Kuldeep Sequeira is a 48 year old male patient.  1. Severe recurrent major depression without psychotic features (H)      No past medical history on file.  Temp: 97.5  F (36.4  C) Temp src: Temporal BP: 121/71 Pulse: 82   Resp: 18 SpO2: 97 % O2 Device: None (Room air)      Procedures     Wm Markham     [facility], [place]   ECT Procedure Note   12/23/2020    Kuldeep Sequeira [id number]   48 year old 1972     Patient Status: Outpatient    Is this the first in a series of 12 treatments?  No    History and Physical: Reviewed in medical record    Consent: Informed consent was signed by: patient    Date Consent Signed: 12/7/20      No Known Allergies    Weight:  0 lbs 0 oz    Patient Preparations: (none)    Wt Readings from Last 5 Encounters:   11/19/20 78.4 kg (172 lb 14.4 oz)       /71   Pulse 82   Temp 97.5  F (36.4  C) (Temporal)   Resp 18   SpO2 97%          Indications for ECT:   Medications ineffective         Clinical Narrative:   Patient has a long history of depression and is starting ECT for treatment-resistant depression.  NPO after 2400.  Alert and Oriented x 3.  No problems with the last ECT.  We switched to BILATERAL ECT last treatment as the mood was not better.  Wife thinks pt is looking better.           Diagnosis:   Major depression         Pause for the Cause:     Right patient Yes   Right procedure/laterality settings: Yes          Intra-Procedure Documentation:     ECT #: 8   Treatment number this series: 8   Total treatment number: 8     Type of ECT:   BILATERAL (instead of Right, unilateral standard)    ECT Medications:      Zyprexa: 10mg  Tylenol: 1000mg  Toradol: 30mg  Zofran: 4mg  Haldol: 5mg  Lactated Ringers: 1/2 liter  Brevital: 80mg  Caffeine: 500mg  Succinyl Choline:  80mg  Versed: 2mg for h/o agitation      ECT Strip Summary:   Energy Level: 25 percent   Motor Seizure Duration:  47 seconds  EEG Seizure Duration:   54 seconds    Complications: No    Plan:   COVID test 12/26/20  Next ECT 12/28/20    Wm Markham MD

## 2022-02-07 NOTE — ANESTHESIA PREPROCEDURE EVALUATION
Anesthesia Evaluation     Patient summary reviewed and Nursing notes reviewed   no history of anesthetic complications:  NPO Solid Status: > 8 hours  NPO Liquid Status: > 8 hours           Airway   Mallampati: II  TM distance: >3 FB  Neck ROM: full  no difficulty expected  Dental - normal exam     Pulmonary - negative pulmonary ROS and normal exam   Cardiovascular - negative cardio ROS and normal exam    Rhythm: regular  Rate: normal        Neuro/Psych  (+) seizures, psychiatric history Anxiety and Depression,     GI/Hepatic/Renal/Endo    (+) obesity, morbid obesity, GERD,  renal disease CRI and stones,     Musculoskeletal (-) negative ROS    Abdominal    Substance History - negative use     OB/GYN negative ob/gyn ROS         Other - negative ROS       ROS/Med Hx Other: Hx Autism                  Anesthesia Plan    ASA 3     MAC   (Pt told that intravenous sedation will be used as the primary anesthetic along with local anesthesia if necessary. Every effort will be made to make sure the patient is comfortable.     The patient was told they may or may not have recall for the procedure. It was further explained that if the MAC was not adequate that a general anesthetic with either an LMA or endotracheal tube would be required.     Will proceed with the plan of care.)  intravenous induction     Anesthetic plan, all risks, benefits, and alternatives have been provided, discussed and informed consent has been obtained with: patient.        CODE STATUS:

## 2022-02-07 NOTE — ANESTHESIA POSTPROCEDURE EVALUATION
Patient: Eboni Hernandez    Procedure Summary       Date: 02/07/22 Room / Location: [facility] ENDOSCOPY 2 / [facility] ENDOSCOPY    Anesthesia Start: 0818 Anesthesia Stop: 0835    Procedure: ESOPHAGOGASTRODUODENOSCOPY WITH biopsy and polypectomy (N/A ) Diagnosis:       Gastroesophageal reflux disease without esophagitis      Dysphagia, unspecified type      Nausea      (Gastroesophageal reflux disease without esophagitis [K21.9])      (Dysphagia, unspecified type [R13.10])      (Nausea [R11.0])    Surgeons: Salvador Becerra MD Provider: Jonny Toledo CRNA    Anesthesia Type: MAC ASA Status: 3            Anesthesia Type: MAC    Vitals  Vitals Value Taken Time   /80 02/07/22 0902   Temp 97.3 °F (36.3 °C) 02/07/22 0832   Pulse 94 02/07/22 0902   Resp 18 02/07/22 0902   SpO2 100 % 02/07/22 0902           Post Anesthesia Care and Evaluation    Patient location during evaluation: bedside  Patient participation: complete - patient participated  Level of consciousness: awake  Pain score: 0  Pain management: adequate  Airway patency: patent  Anesthetic complications: No anesthetic complications  PONV Status: controlled  Cardiovascular status: acceptable and stable  Respiratory status: acceptable and room air  Hydration status: acceptable

## 2022-02-07 NOTE — DISCHARGE INSTRUCTIONS
To assist you in voiding:  Drink plenty of fluids  Listen to running water while attempting to void.    If you are unable to urinate and you have an uncomfortable urge to void or it has been   6 hours since you were discharged, return to the Emergency Room    No pushing, pulling, tugging,  heavy lifting, or strenuous activity.  No major decision making, driving, or drinking alcoholic beverages for 24 hours. ( due to the medications you have  received)  Always use good hand hygiene/washing techniques.  NO driving while taking pain medications.    Discharge patient to home (ambulatory).   - Low fiber small meals  - Empiric low dose PPI for 8-12 weeks   - Antiemetic prn   - Continue present medications.   - Await pathology results.   - Return to my [email or web address] in 8 weeks

## 2022-02-10 LAB
LAB AP CASE REPORT: NORMAL
PATH REPORT.FINAL DX SPEC: NORMAL

## 2022-02-18 ENCOUNTER — APPOINTMENT (OUTPATIENT)
Dept: ULTRASOUND IMAGING | Facility: HOSPITAL | Age: 22
End: 2022-02-18

## 2022-03-23 ENCOUNTER — OFFICE VISIT (OUTPATIENT)
Dept: GASTROENTEROLOGY | Facility: CLINIC | Age: 22
End: 2022-03-23

## 2022-03-23 ENCOUNTER — TRANSCRIBE ORDERS (OUTPATIENT)
Dept: GENERAL RADIOLOGY | Facility: HOSPITAL | Age: 22
End: 2022-03-23

## 2022-03-23 ENCOUNTER — HOSPITAL ENCOUNTER (OUTPATIENT)
Dept: GENERAL RADIOLOGY | Facility: HOSPITAL | Age: 22
Discharge: HOME | End: 2022-03-23

## 2022-03-23 ENCOUNTER — LAB (OUTPATIENT)
Dept: LAB | Facility: HOSPITAL | Age: 22
End: 2022-03-23

## 2022-03-23 VITALS
HEIGHT: 67 IN | DIASTOLIC BLOOD PRESSURE: 70 MMHG | WEIGHT: 274 LBS | SYSTOLIC BLOOD PRESSURE: 128 MMHG | TEMPERATURE: 96.7 F | BODY MASS INDEX: 43 KG/M2

## 2022-03-23 DIAGNOSIS — M10.9 GOUT, UNSPECIFIED CAUSE, UNSPECIFIED CHRONICITY, UNSPECIFIED SITE: ICD-10-CM

## 2022-03-23 DIAGNOSIS — M54.50 LOW BACK PAIN, UNSPECIFIED BACK PAIN LATERALITY, UNSPECIFIED CHRONICITY, UNSPECIFIED WHETHER SCIATICA PRESENT: ICD-10-CM

## 2022-03-23 DIAGNOSIS — K21.9 GASTROESOPHAGEAL REFLUX DISEASE WITHOUT ESOPHAGITIS: Primary | ICD-10-CM

## 2022-03-23 DIAGNOSIS — N20.0 URIC ACID NEPHROLITHIASIS: ICD-10-CM

## 2022-03-23 DIAGNOSIS — R14.2 BELCHING: ICD-10-CM

## 2022-03-23 DIAGNOSIS — K59.04 CHRONIC IDIOPATHIC CONSTIPATION: ICD-10-CM

## 2022-03-23 DIAGNOSIS — M10.9 GOUT, UNSPECIFIED CAUSE, UNSPECIFIED CHRONICITY, UNSPECIFIED SITE: Primary | ICD-10-CM

## 2022-03-23 DIAGNOSIS — F45.8 FUNCTIONAL DYSPHAGIA: ICD-10-CM

## 2022-03-23 PROCEDURE — 99214 OFFICE O/P EST MOD 30 MIN: CPT | Performed by: INTERNAL MEDICINE

## 2022-03-23 PROCEDURE — 74018 RADEX ABDOMEN 1 VIEW: CPT

## 2022-03-23 PROCEDURE — 73521 X-RAY EXAM HIPS BI 2 VIEWS: CPT

## 2022-03-23 RX ORDER — OMEPRAZOLE 20 MG/1
20 CAPSULE, DELAYED RELEASE ORAL DAILY
Qty: 90 CAPSULE | Refills: 3 | Status: SHIPPED | OUTPATIENT
Start: 2022-03-23 | End: 2023-01-17 | Stop reason: SDUPTHER

## 2022-03-23 NOTE — PROGRESS NOTES
Follow Up Note     Date: 2022   Patient Name: Eboni Hernandez  MRN: [id number]  : 2000     Referring Physician: Yvon Feliciano MD    Chief Complaint:    Chief Complaint   Patient presents with   • Heartburn       Interval History:   3/23/2022  bEoni Hernandez is a 21 y.o. female who is here today for follow up for her suspected abdominal pain and belching and reflux symptoms..   As per her caretaker after changing the diet and changing low fiber diet small meals and PPI, her symptoms have significantly improved.     2022  Eboni Hernandez is a 21 y.o. female who is here today to establish care with Gastroenterology for evaluation of acid reflux and heartburn.  Patient has a history of autism and significant intellectual disability, non verbal and seizure disorder.   Her care taker, Aunt says that she has been choking with food and excessive burping since about 2021. She feels that food is not going down. She was started on prilosec 20 mg po daily and was increased to BID since couple of months that is not improving her symptoms. She has nausea and retching.  It is unclear whether she has any abdominal pain as patient is nonverbal.     Her seizure under control as per care taker. She has chronic constipation and takes miralax as needed. She takes percocet as needed.   There is no history of anemia. No prior history of EGD or colonoscopy. No family history of colon cancer. History of stomach cancer remote family member. No history of any abdominal surgery. Denies alcohol abuse or cigarette smoking.      Patient is also on chronic opioids Percocet. She has history of kidney stones.   CT abdomen pelvis done in May 2021 was unremarkable.  CBC CMP was unremarkable 2021      Subjective      Past Medical History:   Past Medical History:   Diagnosis Date   • Anxiety    • Autism    • Chronic pelvic pain in female    • Communication deficit     non-verbal   • Constipation     • COVID-19 vaccine series completed     moderna   • Developmental delay    • Difficulty swallowing solids    • Encopresis    • Enuresis    • GERD (gastroesophageal reflux disease)    • Global developmental delay    • History of kidney stones     6 WEEKS AGO   • Kidney stones    • Multiple developmental ovarian cysts    • Scoliosis    • Seizures (HCC)     LAST SEIZURE WAS ONE month AGO-PETITE MAL.  LAST GRAND MAL SEIZURE WAS end of november   • Urinary incontinence     wears depends     Past Surgical History:   Past Surgical History:   Procedure Laterality Date   • DIAGNOSTIC LAPAROSCOPY N/A 9/11/2018    Procedure: DIAGNOSTIC LAPAROSCOPY drainage of right ovarian cyst;  Surgeon: Amol Ospina MD;  Location: [facility] OR;  Service: Obstetrics/Gynecology   • ENDOSCOPY N/A 2/7/2022    Procedure: ESOPHAGOGASTRODUODENOSCOPY WITH biopsy and polypectomy;  Surgeon: Salvador Becerra MD;  Location: [facility] ENDOSCOPY;  Service: Gastroenterology;  Laterality: N/A;   • OTHER SURGICAL HISTORY      IUD PLACEMENT IN OR   • URETEROSCOPY LASER LITHOTRIPSY WITH STENT INSERTION  2018   • URETEROSCOPY STENT INSERTION  2020       Family History:   Family History   Problem Relation Age of Onset   • Drug abuse Mother    • Anxiety disorder Mother    • Depression Mother    • Drug abuse Father    • ADD / ADHD Neg Hx    • Alcohol abuse Neg Hx    • Bipolar disorder Neg Hx    • Dementia Neg Hx    • OCD Neg Hx    • Paranoid behavior Neg Hx    • Schizophrenia Neg Hx    • Seizures Neg Hx    • Self-Injurious Behavior  Neg Hx    • Suicide Attempts Neg Hx        Social History:   Social History     Socioeconomic History   • Marital status: Single   Tobacco Use   • Smoking status: Never Smoker   • Smokeless tobacco: Never Used   Substance and Sexual Activity   • Alcohol use: No   • Drug use: No   • Sexual activity: Defer       Medications:     Current Outpatient Medications:   •  calcium carbonate (TUMS) 500 MG chewable tablet, Chew 1 tablet  Daily., Disp: , Rfl:   •  Cenobamate (Xcopri) 14 x 150 MG & 14 x200 MG tablet therapy pack, Pack #2:  Take 150 MG tablet once a day for 14 days then take 200 MG tablet for 14 days, Disp: 28 each, Rfl: 0  •  Cenobamate (Xcopri) 200 MG tablet, Maintenance Dose:  Take 1 tablet by mouth every night at bedtime., Disp: 30 tablet, Rfl: 5  •  Cenobamate (Xcopri) 200 MG tablet, Take 1 tablet by mouth every night at bedtime., Disp: 30 tablet, Rfl: 5  •  cloNIDine (Catapres) 0.3 MG tablet, Take 1 tablet by mouth Every Night., Disp: 30 tablet, Rfl: 5  •  diazePAM (VALIUM) 10 MG tablet, Take 1 tablet by mouth 2 (Two) Times a Day As Needed for anxiety, No more than 2 tablets per day, Disp: 60 tablet, Rfl: 1  •  diazePAM, 20 MG Dose, (Valtoco 20 MG Dose) 2 x 10 MG/0.1ML liquid therapy pack, Instill 1 spray into EACH nostril (1 dose= 2 spray devices) when necessary for seizures, Disp: 4 each, Rfl: 5  •  gabapentin (NEURONTIN) 600 MG tablet, Take 1 tablet by mouth 3 (Three) Times a Day As Needed for pain, Disp: 90 tablet, Rfl: 5  •  lacosamide (Vimpat) 50 MG tablet tablet, Take 3 tablets by mouth 2 (Two) Times a Day., Disp: 180 tablet, Rfl: 5  •  lamoTRIgine (LaMICtal) 100 MG tablet dispersible disintegrating tablet, Place 1 tablet on the tongue 2 (Two) Times a Day., Disp: 60 tablet, Rfl: 11  •  lamoTRIgine (LaMICtal) 50 MG tablet dispersible disintegrating tablet, Place 3 tablets on the tongue 2 (two) times a day., Disp: 180 tablet, Rfl: 11  •  Midazolam (Nayzilam) 5 MG/0.1ML solution, Instill 1 spray in nostril if needed for seizure, may repeat in opposite nostril in 10 minutes if needed, Disp: 4 each, Rfl: 5  •  omeprazole (priLOSEC) 20 MG capsule, Take 1 capsule by mouth Daily. (Patient taking differently: Take 20 mg by mouth 2 (Two) Times a Day.), Disp: 90 capsule, Rfl: 1  •  ondansetron (Zofran) 4 MG tablet, Take 1 tablet by mouth Every 12 (Twelve) Hours As Needed for Nausea or Vomiting., Disp: 30 tablet, Rfl: 1  •   "polyethylene glycol (MIRALAX) 17 g packet, Take 17 g by mouth Daily., Disp: , Rfl:   •  risperiDONE (RisperDAL) 2 MG tablet, Take 1 tablet by mouth 2 (Two) Times a Day., Disp: 60 tablet, Rfl: 5    Allergies:   No Known Allergies    Review of Systems:   Review of Systems   Unable to perform ROS: Patient nonverbal       The following portions of the patient's history were reviewed and updated as appropriate: allergies, current medications, past family history, past medical history, past social history, past surgical history and problem list.    Objective     Physical Exam:  Vital Signs:   Vitals:    03/23/22 1400   BP: 128/70   Temp: 96.7 °F (35.9 °C)   TempSrc: Infrared   Weight: 124 kg (274 lb)   Height: 170.2 cm (67\")       Physical Exam  Constitutional:       Appearance: She is obese.   HENT:      Head: Normocephalic and atraumatic.   Eyes:      Conjunctiva/sclera: Conjunctivae normal.   Abdominal:      General: Abdomen is flat. There is no distension.      Palpations: There is no mass.      Tenderness: There is no abdominal tenderness. There is no guarding or rebound.      Hernia: No hernia is present.   Musculoskeletal:      Cervical back: Normal range of motion and neck supple.   Neurological:      Mental Status: She is alert.         Results Review:   I reviewed the patient's new clinical results.    Admission on 02/07/2022, Discharged on 02/07/2022   Component Date Value Ref Range Status   • HCG Qualitative 02/07/2022 Negative  Negative Final   • Case Report 02/07/2022    Final                    Value:Surgical Pathology Report                         Case: [id number]                                  Authorizing Provider:  Salvador Becerra MD  Collected:           02/07/2022 08:19 AM          Ordering Location:     [facility]    Received:            02/07/2022 08:52 AM                                 [facility]                                                                    Pathologist:   "         Joya, Ben Scherer MD                                                       Specimens:   1) - Small Intestine, Duodenum, for celiac                                                          2) - Gastric, Antrum, antrum and body for h. pylori                                                 3) - Esophagus, distal, mid, prox                                                                   4) - Gastric, Body, body and fundus x2                                                    • Final Diagnosis 02/07/2022    Final                    Value:This result contains rich text formatting which cannot be displayed here.   Lab on 02/03/2022   Component Date Value Ref Range Status   • SARS-CoV-2 DINA 02/03/2022 Not Detected  Not Detected Final   Lab on 01/31/2022   Component Date Value Ref Range Status   • WBC 01/31/2022 6.85  3.40 - 10.80 10*3/mm3 Final   • RBC 01/31/2022 4.98  3.77 - 5.28 10*6/mm3 Final   • Hemoglobin 01/31/2022 13.5  12.0 - 15.9 g/dL Final   • Hematocrit 01/31/2022 42.8  34.0 - 46.6 % Final   • MCV 01/31/2022 85.9  79.0 - 97.0 fL Final   • MCH 01/31/2022 27.1  26.6 - 33.0 pg Final   • MCHC 01/31/2022 31.5  31.5 - 35.7 g/dL Final   • RDW 01/31/2022 13.1  12.3 - 15.4 % Final   • RDW-SD 01/31/2022 41.0  37.0 - 54.0 fl Final   • MPV 01/31/2022 11.9  6.0 - 12.0 fL Final   • Platelets 01/31/2022 309  140 - 450 10*3/mm3 Final   • Neutrophil % 01/31/2022 55.3  42.7 - 76.0 % Final   • Lymphocyte % 01/31/2022 32.6  19.6 - 45.3 % Final   • Monocyte % 01/31/2022 9.6  5.0 - 12.0 % Final   • Eosinophil % 01/31/2022 1.5  0.3 - 6.2 % Final   • Basophil % 01/31/2022 0.9  0.0 - 1.5 % Final   • Immature Grans % 01/31/2022 0.1  0.0 - 0.5 % Final   • Neutrophils, Absolute 01/31/2022 3.79  1.70 - 7.00 10*3/mm3 Final   • Lymphocytes, Absolute 01/31/2022 2.23  0.70 - 3.10 10*3/mm3 Final   • Monocytes, Absolute 01/31/2022 0.66  0.10 - 0.90 10*3/mm3 Final   • Eosinophils, Absolute 01/31/2022 0.10  0.00 - 0.40 10*3/mm3 Final   •  Basophils, Absolute 01/31/2022 0.06  0.00 - 0.20 10*3/mm3 Final   • Immature Grans, Absolute 01/31/2022 0.01  0.00 - 0.05 10*3/mm3 Final   • nRBC 01/31/2022 0.0  0.0 - 0.2 /100 WBC Final   • Glucose 01/31/2022 74  65 - 99 mg/dL Final   • BUN 01/31/2022 9  6 - 20 mg/dL Final   • Creatinine 01/31/2022 0.88  0.57 - 1.00 mg/dL Final   • Sodium 01/31/2022 136  136 - 145 mmol/L Final   • Potassium 01/31/2022 4.2  3.5 - 5.2 mmol/L Final   • Chloride 01/31/2022 103  98 - 107 mmol/L Final   • CO2 01/31/2022 23.5  22.0 - 29.0 mmol/L Final   • Calcium 01/31/2022 10.0  8.6 - 10.5 mg/dL Final   • Total Protein 01/31/2022 7.6  6.0 - 8.5 g/dL Final   • Albumin 01/31/2022 4.30  3.50 - 5.20 g/dL Final   • ALT (SGPT) 01/31/2022 28  1 - 33 U/L Final   • AST (SGOT) 01/31/2022 21  1 - 32 U/L Final   • Alkaline Phosphatase 01/31/2022 103  39 - 117 U/L Final   • Total Bilirubin 01/31/2022 0.3  0.0 - 1.2 mg/dL Final   • eGFR   Amer 01/31/2022 97  >60 mL/min/1.73 Final   • Globulin 01/31/2022 3.3  gm/dL Final   • A/G Ratio 01/31/2022 1.3  g/dL Final   • BUN/Creatinine Ratio 01/31/2022 10.2  7.0 - 25.0 Final   • Anion Gap 01/31/2022 9.5  5.0 - 15.0 mmol/L Final   Lab on 12/27/2021   Component Date Value Ref Range Status   • Color, UA 02/03/2022 Yellow  Yellow, Straw Final   • Appearance, UA 02/03/2022 Clear  Clear Final   • pH, UA 02/03/2022 6.0  5.0 - 8.0 Final   • Specific Gravity, UA 02/03/2022 1.022  1.005 - 1.030 Final   • Glucose, UA 02/03/2022 Negative  Negative Final   • Ketones, UA 02/03/2022 Negative  Negative Final   • Bilirubin, UA 02/03/2022 Negative  Negative Final   • Blood, UA 02/03/2022 Negative  Negative Final   • Protein, UA 02/03/2022 Negative  Negative Final   • Leuk Esterase, UA 02/03/2022 Negative  Negative Final   • Nitrite, UA 02/03/2022 Negative  Negative Final   • Urobilinogen, UA 02/03/2022 1.0 E.U./dL  0.2 - 1.0 E.U./dL Final   • RBC, UA 02/03/2022 None Seen  None Seen /HPF Final   • WBC, UA 02/03/2022 3-5  (A) None Seen /HPF Final   • Bacteria, UA 02/03/2022 Trace (A) None Seen /HPF Final   • Squamous Epithelial Cells, UA 02/03/2022 3-6 (A) None Seen, 0-2 /HPF Final   • Hyaline Casts, UA 02/03/2022 None Seen  None Seen /LPF Final   • Mucus, UA 02/03/2022 Trace  None Seen, Trace /HPF Final   • Methodology 02/03/2022 Manual Light Microscopy   Final      US Abdomen Complete    Result Date: 2/3/2022  Normal ultrasound of the abdomen.  This report was finalized on 2/3/2022 1:59 PM by Cristina Graham M.D..    XR Abdomen KUB    Result Date: 3/23/2022  No acute fracture.       PROCEDURE: XR ABDOMEN KUB-  HISTORY: PAIN; M10.9-Gout, unspecified; N20.0-Calculus of kidney; M54.50-Low back pain, unspecified  COMPARISON: None.  FINDINGS: An AP view of the abdomen and pelvis demonstrates an unremarkable bowel gas pattern with no evidence of obstruction. No radiopaque stones are seen overlying the renal shadows. There are phleboliths in the pelvis.  IMPRESSION: No acute process identified.        Images were reviewed, interpreted, and dictated by Dr. Cristina Graham M.D. Transcribed by Gifty Vázquez PA-C.    XR Abdomen KUB    Result Date: 1/25/2022  Unremarkable exam.    This report was finalized on 1/25/2022 11:45 AM by Cristina Graham M.D..    XR Abdomen KUB    Result Date: 12/27/2021  Unremarkable exam.    This report was finalized on 12/27/2021 8:51 AM by Cristina Graham M.D..    XR Hips Bilateral With or Without Pelvis 2 View    Result Date: 3/23/2022  No acute fracture.       PROCEDURE: XR ABDOMEN KUB-  HISTORY: PAIN; M10.9-Gout, unspecified; N20.0-Calculus of kidney; M54.50-Low back pain, unspecified  COMPARISON: None.  FINDINGS: An AP view of the abdomen and pelvis demonstrates an unremarkable bowel gas pattern with no evidence of obstruction. No radiopaque stones are seen overlying the renal shadows. There are phleboliths in the pelvis.  IMPRESSION: No acute process identified.        Images were reviewed,  interpreted, and dictated by Dr. Cristina Graham M.D. Transcribed by Gifty Vázquez PA-C.    2/7/2022  - Normal oropharynx.  - Z-line regular, 37 cm from the incisors.  - No gross lesions in esophagus.  - No endoscopic esophageal abnormality to explain patient's dysphagia. Biopsied.  - Two gastric polyps. Resected and retrieved.  - Normal cardia, gastric fundus, gastric body, incisura, antrum and prepyloric region of the stomach except two  small FGP. Biopsied.  - Normal duodenal bulb, first portion of the duodenum, second portion of the duodenum and third portion of the  duodenum. Biopsied.  - No endosocpic findings that could explain her symptoms  - Patient most likely has Functional symptoms.    Path; WNL    Assessment / Plan      1. Gastroesophageal reflux disease without esophagitis  2.  Functional dysphagia  3. Nausea  3/23/2022  Her symptoms significantly improved after dietary changes and starting on a PPI.  EGD done on 2/7/2022 did not reveal any significant abnormalities except for the gland polyps.  Gastric biopsy negative for H. pylori.  Duodenal biopsies were normal.  Recent CBC, CMP, celiac serology in January 2022 was negative.  Esophageal biopsies were normal as well. Ultrasound abdomen done on 1/31/2022 did not reveal any gallbladder pathology and was unremarkable.  As PPI helped her we will continue with the low-dose Prilosec 20 mg p.o. daily for now    1/31/2022  As patient is nonverbal it is very difficult to interpret what symptoms patient has.  However as per caretaker she seems to have a difficulty in swallowing and excessive burping and dry heaves.  She may have a significant esophageal reflux and may even have a erosive esophagitis causing dysphagia.  She may also have a hiatal hernia with esophageal ring causing dysphagia.     It is also possible patient may have some upper abdominal pain and gallstones need to be ruled out.  H. pylori need to be ruled out.  She does have a  constipation and nausea.  Given her obesity some element of irritable bowel cannot be ruled out.     4. Constipation, unspecified constipation type  She is mostly having daily bowel movement of the MiraLAX 17 g p.o. daily now we will continue the same    Prior history  5. Intellectual disability, profound  Patient is nonverbal.  Patient is from caretaker     Patient has a prior history of renal stones.  Unclear why the patient is getting every monthly x-rays          Follow Up:   No follow-ups on file.    Salvador Becerra MD  G[facility]enter[facility] [place]  3/23/2022  14:02 EDT     Please note that portions of this note may have been completed with a voice recognition program.

## 2022-03-28 ENCOUNTER — TELEPHONE (OUTPATIENT)
Dept: GASTROENTEROLOGY | Facility: CLINIC | Age: 22
End: 2022-03-28

## 2022-03-28 NOTE — TELEPHONE ENCOUNTER
----- Message from Nahomi Aguila MA sent at 3/28/2022  9:04 AM EDT -----    ----- Message -----  From: Salvador Becerra MD  Sent: 3/25/2022   9:41 PM EDT  To: Nahomi Aguila MA    This patient is non verbal. She is getting monthly abdominal xray and monthly US abdomen at urology office for more than a year without any valid indication. She was been told to come for month xray this yr as well. In fact she has been getting Xray/ CTAP for the last 3 years without any positive findings. Her mom is very concerned about this.  I am concerned too.    She possibly had a tiny questionable stone  versus calcification noted in pelvis or ureter 4 years ago and absolutely no indication for  any xray or monthly US indicated.     This is not a standard of care.     Please forward this information to [facility] to Dr Feliciano's Attention.

## 2022-06-06 ENCOUNTER — TRANSCRIBE ORDERS (OUTPATIENT)
Dept: LAB | Facility: HOSPITAL | Age: 22
End: 2022-06-06

## 2022-06-06 ENCOUNTER — HOSPITAL ENCOUNTER (OUTPATIENT)
Dept: GENERAL RADIOLOGY | Facility: HOSPITAL | Age: 22
Discharge: HOME | End: 2022-06-06
Admitting: UROLOGY

## 2022-06-06 DIAGNOSIS — N20.0 KIDNEY STONE: ICD-10-CM

## 2022-06-06 DIAGNOSIS — N20.0 KIDNEY STONE: Primary | ICD-10-CM

## 2022-06-06 PROCEDURE — 74018 RADEX ABDOMEN 1 VIEW: CPT

## 2022-07-15 ENCOUNTER — TRANSCRIBE ORDERS (OUTPATIENT)
Dept: GENERAL RADIOLOGY | Facility: HOSPITAL | Age: 22
End: 2022-07-15

## 2022-07-15 ENCOUNTER — HOSPITAL ENCOUNTER (OUTPATIENT)
Dept: GENERAL RADIOLOGY | Facility: HOSPITAL | Age: 22
Discharge: HOME | End: 2022-07-15
Admitting: UROLOGY

## 2022-07-15 ENCOUNTER — LAB (OUTPATIENT)
Dept: LAB | Facility: HOSPITAL | Age: 22
End: 2022-07-15

## 2022-07-15 ENCOUNTER — TRANSCRIBE ORDERS (OUTPATIENT)
Dept: LAB | Facility: HOSPITAL | Age: 22
End: 2022-07-15

## 2022-07-15 DIAGNOSIS — N30.20 BLADDER INFECTION, CHRONIC: ICD-10-CM

## 2022-07-15 DIAGNOSIS — N30.20 BLADDER INFECTION, CHRONIC: Primary | ICD-10-CM

## 2022-07-15 PROCEDURE — 74018 RADEX ABDOMEN 1 VIEW: CPT

## 2022-07-22 ENCOUNTER — OFFICE VISIT (OUTPATIENT)
Dept: PSYCHIATRY | Facility: CLINIC | Age: 22
End: 2022-07-22

## 2022-07-22 DIAGNOSIS — F41.8 OTHER SPECIFIED ANXIETY DISORDERS: ICD-10-CM

## 2022-07-22 DIAGNOSIS — F84.0 AUTISM SPECTRUM DISORDER REQUIRING VERY SUBSTANTIAL SUPPORT (LEVEL 3): ICD-10-CM

## 2022-07-22 DIAGNOSIS — R46.89 AGGRESSION: ICD-10-CM

## 2022-07-22 PROCEDURE — 99214 OFFICE O/P EST MOD 30 MIN: CPT | Performed by: PSYCHIATRY & NEUROLOGY

## 2022-07-22 RX ORDER — CLONIDINE HYDROCHLORIDE 0.3 MG/1
0.3 TABLET ORAL NIGHTLY
Qty: 30 TABLET | Refills: 5 | Status: SHIPPED | OUTPATIENT
Start: 2022-07-22 | End: 2023-02-21 | Stop reason: SDUPTHER

## 2022-07-22 RX ORDER — RISPERIDONE 2 MG/1
2 TABLET ORAL 2 TIMES DAILY
Qty: 60 TABLET | Refills: 5 | Status: SHIPPED | OUTPATIENT
Start: 2022-07-22 | End: 2023-02-15 | Stop reason: SDUPTHER

## 2022-07-29 NOTE — PROGRESS NOTES
Subjective   Eboni Hernandez is a 22 y.o. female who presents today for follow up    This provider is located at [facility] at [address] [place] [address]. The provider identified himself as well as his credentials.   The Patient is at home using her phone because problems with video connection. The patient's condition being diagnosed/treated is appropriate for telemedicine. The patient and guardian gave consent to be seen remotely, and when consent is given they understand that the consent allows for patient identifiable information to be sent to a third party as needed.   They may refuse to be seen remotely at any time. The electronic data is encrypted and password protected, and the patient has been advised of the potential risks to privacy not withstanding such measures        Chief Complaint: Intellectual disability and autism spectrum disorder    History of Present Illness: Patient doing very well. Seizures are back under control. She is not having any reactive behavior, notable anxiety, or mood reactivity. She is not having medication side effects. She is sleeping and eating appropriately.     The following portions of the patient's history were reviewed and updated as appropriate: allergies, current medications, past family history, past medical history, past social history, past surgical history and problem list.      Past Medical History:  Past Medical History:   Diagnosis Date   • Anxiety    • Autism    • Chronic pelvic pain in female    • Communication deficit     non-verbal   • Constipation    • COVID-19 vaccine series completed     moderna   • Developmental delay    • Difficulty swallowing solids    • Encopresis    • Enuresis    • GERD (gastroesophageal reflux disease)    • Global developmental delay    • History of kidney stones     6 WEEKS AGO   • Kidney stones    • Multiple developmental ovarian cysts    • Scoliosis    • Seizures (HCC)     LAST SEIZURE WAS ONE  month AGO-PETITE MAL.  LAST GRAND MAL SEIZURE WAS end of november   • Urinary incontinence     wears depends       Social History:  Social History     Socioeconomic History   • Marital status: Single   Tobacco Use   • Smoking status: Never Smoker   • Smokeless tobacco: Never Used   Substance and Sexual Activity   • Alcohol use: No   • Drug use: No   • Sexual activity: Defer       Family History:  Family History   Problem Relation Age of Onset   • Drug abuse Mother    • Anxiety disorder Mother    • Depression Mother    • Drug abuse Father    • ADD / ADHD Neg Hx    • Alcohol abuse Neg Hx    • Bipolar disorder Neg Hx    • Dementia Neg Hx    • OCD Neg Hx    • Paranoid behavior Neg Hx    • Schizophrenia Neg Hx    • Seizures Neg Hx    • Self-Injurious Behavior  Neg Hx    • Suicide Attempts Neg Hx        Past Surgical History:  Past Surgical History:   Procedure Laterality Date   • DIAGNOSTIC LAPAROSCOPY N/A 9/11/2018    Procedure: DIAGNOSTIC LAPAROSCOPY drainage of right ovarian cyst;  Surgeon: Amol Ospina MD;  Location: [facility] OR;  Service: Obstetrics/Gynecology   • ENDOSCOPY N/A 2/7/2022    Procedure: ESOPHAGOGASTRODUODENOSCOPY WITH biopsy and polypectomy;  Surgeon: Salvador Becerra MD;  Location: [facility] END[facility]COPY;  Service: Gastroenterology;  Laterality: N/A;   • OTHER SURGICAL HISTORY      IUD PLACEMENT IN OR   • URETEROSCOPY LASER LITHOTRIPSY WITH STENT INSERTION  2018   • URETEROSCOPY STENT INSERTION  2020       Problem List:  Patient Active Problem List   Diagnosis   • Problems with communication   • Intellectual disability, profound   • Encopresis   • Enuresis   • Aggression   • Insomnia due to other mental disorder   • Autism spectrum disorder   • Cyst of ovary   • Pelvic pain   • Nephrolithiasis   • Nexplanon in place   • Seizure disorder (HCC)   • Gastroesophageal reflux disease without esophagitis   • Dysphagia   • Nausea       Allergy:   No Known Allergies     Current Medications:   Current  Outpatient Medications   Medication Sig Dispense Refill   • cloNIDine (Catapres) 0.3 MG tablet Take 1 tablet by mouth Every Night. 30 tablet 5   • risperiDONE (RisperDAL) 2 MG tablet Take 1 tablet by mouth 2 (Two) Times a Day. 60 tablet 5   • calcium carbonate (TUMS) 500 MG chewable tablet Chew 1 tablet Daily.     • Cenobamate (Xcopri) 14 x 150 MG & 14 x200 MG tablet therapy pack Pack #2:  Take 150 MG tablet once a day for 14 days then take 200 MG tablet for 14 days 28 each 0   • Cenobamate (Xcopri) 200 MG tablet Take 1 tablet by mouth every night at bedtime. 30 tablet 5   • Cenobamate (Xcopri) 200 MG tablet TAKE ONE TABLET BY MOUTH AT BEDTIME 30 tablet 0   • Cenobamate (Xcopri) 200 MG tablet Take 1 tablet by mouth every night at bedtime. 30 tablet 5   • diazePAM (VALIUM) 10 MG tablet Take 1 tablet by mouth 2 (Two) Times a Day As Needed for anxiety, No more than 2 tablets per day 60 tablet 1   • diazePAM (Valium) 10 MG tablet Take 1 tablet by mouth 2 (Two) Times a Day As Needed. 60 tablet 1   • diazePAM (Valium) 10 MG tablet Take 1 tablet by mouth 2 (Two) Times a Day As Needed. 60 tablet 1   • diazePAM (Valium) 10 MG tablet Take 1 tablet by mouth 2 (Two) Times a Day As Needed. 60 tablet 1   • diazePAM (Valium) 10 MG tablet Take 1 tablet by mouth 2 (Two) Times a Day As Needed. 60 tablet 1   • diazePAM, 20 MG Dose, (Valtoco 20 MG Dose) 2 x 10 MG/0.1ML liquid therapy pack Instill 1 spray into EACH nostril (1 dose= 2 spray devices) when necessary for seizures 4 each 5   • gabapentin (NEURONTIN) 600 MG tablet Take 1 tablet by mouth 3 (Three) Times a Day As Needed for pain 90 tablet 5   • gabapentin (NEURONTIN) 600 MG tablet Take 1 tablet by mouth 3 (Three) Times a Day. 90 tablet 5   • gabapentin (NEURONTIN) 600 MG tablet Take 1 tablet by mouth 3 (Three) Times a Day. 90 tablet 5   • gabapentin (NEURONTIN) 800 MG tablet Take 1 tablet by mouth 3 (Three) Times a Day. 90 tablet 5   • lacosamide (Vimpat) 50 MG tablet tablet  Take 3 tablets by mouth 2 (Two) Times a Day. 180 tablet 5   • lamoTRIgine (LaMICtal) 50 MG tablet dispersible disintegrating tablet Place 3 tablets on the tongue 2 (two) times a day. 180 tablet 11   • lamoTRIgine (LaMICtal) 50 MG tablet dispersible disintegrating tablet Place 3 tablets on the tongue 2 (Two) Times a Day. 180 tablet 11   • Midazolam (Nayzilam) 5 MG/0.1ML solution Instill 1 spray in nostril if needed for seizure, may repeat in opposite nostril in 10 minutes if needed 4 each 5   • omeprazole (priLOSEC) 20 MG capsule Take 1 capsule by mouth Daily. 90 capsule 3   • ondansetron (Zofran) 4 MG tablet Take 1 tablet by mouth Every 12 (Twelve) Hours As Needed for Nausea or Vomiting. 30 tablet 1   • oxyCODONE-acetaminophen (PERCOCET) 7.5-325 MG per tablet Take 1 tablet by mouth Every 4 (Four) Hours As Needed for pain 30 tablet 0   • polyethylene glycol (MIRALAX) 17 g packet Take 17 g by mouth Daily.       No current facility-administered medications for this visit.       Review of Symptoms:    Review of Systems   Constitutional: Negative for activity change.   HENT: Negative.    Eyes: Negative.    Respiratory: Negative.    Cardiovascular: Negative.    Gastrointestinal: Negative.  Positive for GERD.   Endocrine: Negative.    Genitourinary: Negative.    Musculoskeletal: Negative.    Allergic/Immunologic: Negative.    Neurological: Positive for seizures and speech difficulty (nonverbal).   Hematological: Negative.    Psychiatric/Behavioral: Negative for agitation, behavioral problems, sleep disturbance and negative for hyperactivity. The patient is not nervous/anxious.          Physical Exam:   not currently breastfeeding.  Unable to assess, telephone visit    Appearance: Unable to assess, telephone visit  Gait, Station, Strength: Unable to assess, telephone visit    Mental Status Exam:     Mental Status exam performed 07/22/2022  and patient shows no significant changes from previous exam.     Hygiene:   Unable to  assess, telephone visit  Cooperation:  apathetic, unengaged  Eye Contact:  Unable to assess, telephone visit  Psychomotor Behavior:  Unable to assess, telephone visit  Affect:  Blunted  Mood: normal  Speech:  Mute and lack of meaningful speech, makes noises  Thought Process:  Unable to demonstrate  Thought Content:  Unable to demonstrate  Suicidal:  unable to assess  Homicidal:  unable to assess  Hallucinations:  unable to assess  Delusion:  Unable to demonstrate  Memory:  Unable to evaluate  Orientation:  Unable to evaluate  Reliability:  unable to assess  Insight:  unable to assess  Judgement:  Impaired  Impulse Control:  Impaired  Physical/Medical Issues:  Yes seizure disorder       Lab Results:   No visits with results within 1 Month(s) from this visit.   Latest known visit with results is:   Admission on 02/07/2022, Discharged on 02/07/2022   Component Date Value Ref Range Status   • HCG Qualitative 02/07/2022 Negative  Negative Final   • Case Report 02/07/2022    Final                    Value:Surgical Pathology Report                         Case: [id number]                                  Authorizing Provider:  Salvador Becerra MD  Collected:           02/07/2022 08:19 AM          Ordering Location:     [facility]    Received:            02/07/2022 08:52 AM                                 [facility]                                                                    Pathologist:           Ben Hinson MD                                                       Specimens:   1) - Small Intestine, Duodenum, for celiac                                                          2) - Gastric, Antrum, antrum and body for h. pylori                                                 3) - Esophagus, distal, mid, prox                                                                   4) - Gastric, Body, body and fundus x2                                                    • Final Diagnosis 02/07/2022     Final                    Value:This result contains rich text formatting which cannot be displayed here.       Assessment & Plan   Diagnoses and all orders for this visit:    1. Autism spectrum disorder requiring very substantial support (level 3)  -     risperiDONE (RisperDAL) 2 MG tablet; Take 1 tablet by mouth 2 (Two) Times a Day.  Dispense: 60 tablet; Refill: 5  -     cloNIDine (Catapres) 0.3 MG tablet; Take 1 tablet by mouth Every Night.  Dispense: 30 tablet; Refill: 5    2. Aggression  -     risperiDONE (RisperDAL) 2 MG tablet; Take 1 tablet by mouth 2 (Two) Times a Day.  Dispense: 60 tablet; Refill: 5  -     cloNIDine (Catapres) 0.3 MG tablet; Take 1 tablet by mouth Every Night.  Dispense: 30 tablet; Refill: 5    3. Other specified anxiety disorders  -     cloNIDine (Catapres) 0.3 MG tablet; Take 1 tablet by mouth Every Night.  Dispense: 30 tablet; Refill: 5    -Patient stable and continues to be improved.   -Reviewed previous documentation  -Reviewed most recent available labs  -Sent for recent laboratory studies   -DOUGLAS reviewed and appropriate. Patient counseled on use of controlled substances.   -Continue Risperdal 2 mg twice daily for autism spectrum disorder, behavioral disturbance, and anxiety  -Continue diazepam 10 mg p.o. 2 times daily as needed for anxiety.  Patient getting this from neurology for seizure control   -Continue clonidine 0.3 mg p.o. nightly for insomnia, behaviors, and anxiety  -Continue Gabapentin 800 mg 3 times daily for mood and pain  -Approximate appointment time 9:30 AM to 9:45 AM via telephone visit      Visit Diagnoses:    ICD-10-CM ICD-9-CM   1. Autism spectrum disorder requiring very substantial support (level 3)  F84.0 299.00   2. Aggression  R46.89 V40.39   3. Other specified anxiety disorders  F41.8 300.09       TREATMENT PLAN/GOALS: Continue supportive psychotherapy efforts and medications as indicated. Treatment and medication options discussed during today's visit.  Patient acknowledged and verbally consented to continue with current treatment plan and was educated on the importance of compliance with treatment and follow-up appointments.    MEDICATION ISSUES:    Discussed medication options and treatment plan of prescribed medication as well as the risks, benefits, and side effects including potential falls, possible impaired driving and metabolic adversities among others. Patient is agreeable to call the office with any worsening of symptoms or onset of side effects. Patient is agreeable to call 911 or go to the nearest ER should he/she begin having SI/HI.     MEDS ORDERED DURING VISIT:  New Medications Ordered This Visit   Medications   • risperiDONE (RisperDAL) 2 MG tablet     Sig: Take 1 tablet by mouth 2 (Two) Times a Day.     Dispense:  60 tablet     Refill:  5   • cloNIDine (Catapres) 0.3 MG tablet     Sig: Take 1 tablet by mouth Every Night.     Dispense:  30 tablet     Refill:  5       Return in about 6 months (around 1/22/2023).             This document has been electronically signed by Simon Kapoor MD  July 29, 2022 14:03 EDT

## 2022-08-29 ENCOUNTER — TRANSCRIBE ORDERS (OUTPATIENT)
Dept: LAB | Facility: HOSPITAL | Age: 22
End: 2022-08-29

## 2022-08-29 ENCOUNTER — HOSPITAL ENCOUNTER (OUTPATIENT)
Dept: GENERAL RADIOLOGY | Facility: HOSPITAL | Age: 22
Discharge: HOME | End: 2022-08-29
Admitting: UROLOGY

## 2022-08-29 DIAGNOSIS — N30.20 BLADDER INFECTION, CHRONIC: Primary | ICD-10-CM

## 2022-08-29 DIAGNOSIS — N30.20 BLADDER INFECTION, CHRONIC: ICD-10-CM

## 2022-08-29 PROCEDURE — 74018 RADEX ABDOMEN 1 VIEW: CPT

## 2022-10-18 ENCOUNTER — TRANSCRIBE ORDERS (OUTPATIENT)
Dept: ADMINISTRATIVE | Facility: HOSPITAL | Age: 22
End: 2022-10-18

## 2022-10-18 DIAGNOSIS — N20.0 KIDNEY STONE: Primary | ICD-10-CM

## 2022-10-26 ENCOUNTER — TRANSCRIBE ORDERS (OUTPATIENT)
Dept: GENERAL RADIOLOGY | Facility: HOSPITAL | Age: 22
End: 2022-10-26

## 2022-10-26 ENCOUNTER — HOSPITAL ENCOUNTER (OUTPATIENT)
Dept: CT IMAGING | Facility: HOSPITAL | Age: 22
Discharge: HOME | End: 2022-10-26

## 2022-10-26 ENCOUNTER — HOSPITAL ENCOUNTER (OUTPATIENT)
Dept: GENERAL RADIOLOGY | Facility: HOSPITAL | Age: 22
Discharge: HOME | End: 2022-10-26

## 2022-10-26 DIAGNOSIS — N20.0 KIDNEY STONE: ICD-10-CM

## 2022-10-26 DIAGNOSIS — N20.0 KIDNEY STONE: Primary | ICD-10-CM

## 2022-10-26 PROCEDURE — 74018 RADEX ABDOMEN 1 VIEW: CPT

## 2022-10-26 PROCEDURE — 74176 CT ABD & PELVIS W/O CONTRAST: CPT

## 2022-12-21 ENCOUNTER — HOSPITAL ENCOUNTER (OUTPATIENT)
Dept: GENERAL RADIOLOGY | Facility: HOSPITAL | Age: 22
Discharge: HOME | End: 2022-12-21
Admitting: UROLOGY

## 2022-12-21 ENCOUNTER — TRANSCRIBE ORDERS (OUTPATIENT)
Dept: LAB | Facility: HOSPITAL | Age: 22
End: 2022-12-21

## 2022-12-21 DIAGNOSIS — N30.20 BLADDER INFECTION, CHRONIC: Primary | ICD-10-CM

## 2022-12-21 DIAGNOSIS — N30.20 BLADDER INFECTION, CHRONIC: ICD-10-CM

## 2022-12-21 PROCEDURE — 74018 RADEX ABDOMEN 1 VIEW: CPT

## 2023-01-17 ENCOUNTER — OFFICE VISIT (OUTPATIENT)
Dept: GASTROENTEROLOGY | Facility: CLINIC | Age: 23
End: 2023-01-17
Payer: MEDICARE

## 2023-01-17 VITALS — HEIGHT: 67 IN | TEMPERATURE: 98.2 F | WEIGHT: 293 LBS | BODY MASS INDEX: 45.99 KG/M2

## 2023-01-17 DIAGNOSIS — R14.2 BELCHING: ICD-10-CM

## 2023-01-17 DIAGNOSIS — K21.9 GASTROESOPHAGEAL REFLUX DISEASE WITHOUT ESOPHAGITIS: ICD-10-CM

## 2023-01-17 DIAGNOSIS — R93.5 ABNORMAL CT OF THE ABDOMEN: Primary | ICD-10-CM

## 2023-01-17 DIAGNOSIS — K59.04 CHRONIC IDIOPATHIC CONSTIPATION: ICD-10-CM

## 2023-01-17 PROCEDURE — 99214 OFFICE O/P EST MOD 30 MIN: CPT | Performed by: INTERNAL MEDICINE

## 2023-01-17 RX ORDER — SODIUM CHLORIDE 9 MG/ML
70 INJECTION, SOLUTION INTRAVENOUS CONTINUOUS PRN
Status: CANCELLED | OUTPATIENT
Start: 2023-01-17

## 2023-01-17 RX ORDER — OMEPRAZOLE 20 MG/1
20 CAPSULE, DELAYED RELEASE ORAL DAILY
Qty: 90 CAPSULE | Refills: 3 | Status: SHIPPED | OUTPATIENT
Start: 2023-01-17

## 2023-01-17 NOTE — PROGRESS NOTES
Follow Up Note     Date: 2023   Patient Name: Eboni Hernandez  MRN: [id number]  : 2000     Referring Physician: Yvon Feliciano MD    Chief Complaint:    Chief Complaint   Patient presents with   • Belching   • Heartburn   • Constipation   • Difficulty Swallowing       Interval History:   2023  Eboni Hernandez is a 22 y.o. female who is here today for follow up for her belching, abdominal pain, constipation.  Mom states that she gained over 30 pounds recently.  She still has intermittent belching.  No vomiting.  Has been having daily bowel movement with MiraLAX.    2022  Eboni Hernandez is a 21 y.o. female who is here today to establish care with Gastroenterology for evaluation of acid reflux and heartburn.  Patient has a history of autism and significant intellectual disability, non verbal and seizure disorder.   Her care taker, Aunt says that she has been choking with food and excessive burping since about 2021. She feels that food is not going down. She was started on prilosec 20 mg po daily and was increased to BID since couple of months that is not improving her symptoms. She has nausea and retching.  It is unclear whether she has any abdominal pain as patient is nonverbal.     Her seizure under control as per care taker. She has chronic constipation and takes miralax as needed. She takes percocet as needed.   There is no history of anemia. No prior history of EGD or colonoscopy. No family history of colon cancer. History of stomach cancer remote family member. No history of any abdominal surgery. Denies alcohol abuse or cigarette smoking.      Patient is also on chronic opioids Percocet. She has history of kidney stones.   CT abdomen pelvis done in May 2021 was unremarkable.  CBC CMP was unremarkable 2021  Subjective      Past Medical History:   Past Medical History:   Diagnosis Date   • Anxiety    • Autism    • Chronic pelvic pain in female    •  Communication deficit     non-verbal   • Constipation    • COVID-19 vaccine series completed     moderna   • Developmental delay    • Difficulty swallowing solids    • Encopresis    • Enuresis    • GERD (gastroesophageal reflux disease)    • Global developmental delay    • History of kidney stones     6 WEEKS AGO   • Kidney stones    • Multiple developmental ovarian cysts    • Scoliosis    • Seizures (HCC)     LAST SEIZURE WAS ONE month AGO-PETITE MAL.  LAST GRAND MAL SEIZURE WAS end of november   • Urinary incontinence     wears depends     Past Surgical History:   Past Surgical History:   Procedure Laterality Date   • DIAGNOSTIC LAPAROSCOPY N/A 9/11/2018    Procedure: DIAGNOSTIC LAPAROSCOPY drainage of right ovarian cyst;  Surgeon: Amol Ospina MD;  Location: [facility] OR;  Service: Obstetrics/Gynecology   • ENDOSCOPY N/A 2/7/2022    Procedure: ESOPHAGOGASTRODUODENOSCOPY WITH biopsy and polypectomy;  Surgeon: Salvador Becerra MD;  Location: [facility] ENDOSCOPY;  Service: Gastroenterology;  Laterality: N/A;   • OTHER SURGICAL HISTORY      IUD PLACEMENT IN OR   • URETEROSCOPY LASER LITHOTRIPSY WITH STENT INSERTION  2018   • URETEROSCOPY STENT INSERTION  2020       Family History:   Family History   Problem Relation Age of Onset   • Drug abuse Mother    • Anxiety disorder Mother    • Depression Mother    • Drug abuse Father    • ADD / ADHD Neg Hx    • Alcohol abuse Neg Hx    • Bipolar disorder Neg Hx    • Dementia Neg Hx    • OCD Neg Hx    • Paranoid behavior Neg Hx    • Schizophrenia Neg Hx    • Seizures Neg Hx    • Self-Injurious Behavior  Neg Hx    • Suicide Attempts Neg Hx        Social History:   Social History     Socioeconomic History   • Marital status: Single   Tobacco Use   • Smoking status: Never   • Smokeless tobacco: Never   Substance and Sexual Activity   • Alcohol use: No   • Drug use: No   • Sexual activity: Defer       Medications:     Current Outpatient Medications:   •  Cenobamate (Xcopri)  200 MG tablet, Take 1 tablet by mouth every night at bedtime., Disp: 30 tablet, Rfl: 5  •  cloNIDine (Catapres) 0.3 MG tablet, Take 1 tablet by mouth Every Night., Disp: 30 tablet, Rfl: 5  •  diazePAM (Valium) 10 MG tablet, TAKE 1 TABLET TWICE DAILY AS NEEDED., Disp: 60 tablet, Rfl: 1  •  diazePAM, 20 MG Dose, (Valtoco 20 MG Dose) 2 x 10 MG/0.1ML liquid therapy pack, Instill 1 spray into EACH nostril (1 dose= 2 spray devices) when necessary for seizures, Disp: 4 each, Rfl: 5  •  famotidine (PEPCID) 20 MG tablet, Take 1 tablet by mouth 2 (Two) Times a Day., Disp: 180 tablet, Rfl: 6  •  gabapentin (NEURONTIN) 600 MG tablet, Take 1 tablet by mouth 3 (Three) Times a Day., Disp: 90 tablet, Rfl: 5  •  lamoTRIgine (LaMICtal) 50 MG tablet dispersible disintegrating tablet, Place 3 tablets on the tongue 2 (two) times a day., Disp: 180 tablet, Rfl: 11  •  lamoTRIgine (LaMICtal) 50 MG tablet dispersible disintegrating tablet, Place 3 tablets on the tongue 2 (Two) Times a Day., Disp: 180 tablet, Rfl: 5  •  metoclopramide (REGLAN) 5 MG tablet, Take 1 tablet by mouth 4 (Four) Times a Day before meals, Disp: 120 tablet, Rfl: 6  •  Midazolam (Nayzilam) 5 MG/0.1ML solution, Instill 1 spray in nostril if needed for seizure, may repeat in opposite nostril in 10 minutes if needed, Disp: 4 each, Rfl: 5  •  omeprazole (priLOSEC) 20 MG capsule, Take 1 capsule by mouth Daily., Disp: 90 capsule, Rfl: 3  •  ondansetron (Zofran) 4 MG tablet, Take 1 tablet by mouth Every 12 (Twelve) Hours As Needed for Nausea or Vomiting., Disp: 30 tablet, Rfl: 1  •  oxyCODONE-acetaminophen (PERCOCET) 7.5-325 MG per tablet, TAKE 1 TABLET EVERY 4 HOURS AS NEEDED FOR PAIN., Disp: 30 tablet, Rfl: 0  •  polyethylene glycol (MIRALAX) 17 g packet, Take 17 g by mouth Daily., Disp: , Rfl:   •  risperiDONE (RisperDAL) 2 MG tablet, Take 1 tablet by mouth 2 (Two) Times a Day., Disp: 60 tablet, Rfl: 5  •  calcium carbonate (TUMS) 500 MG chewable tablet, Chew 1 tablet  Daily., Disp: , Rfl:   •  Cenobamate (Xcopri) 14 x 150 MG & 14 x200 MG tablet therapy pack, Pack #2:  Take 150 MG tablet once a day for 14 days then take 200 MG tablet for 14 days, Disp: 28 each, Rfl: 0  •  Cenobamate (Xcopri) 200 MG tablet, Take 1 tablet by mouth every night at bedtime., Disp: 30 tablet, Rfl: 5  •  Cenobamate (Xcopri) 200 MG tablet, TAKE ONE TABLET BY MOUTH AT BEDTIME, Disp: 30 tablet, Rfl: 0  •  Cenobamate (Xcopri) 200 MG tablet, Take 1 tablet by mouth every night at bedtime., Disp: 30 tablet, Rfl: 5  •  diazePAM (VALIUM) 10 MG tablet, Take 1 tablet by mouth 2 (Two) Times a Day As Needed for anxiety, No more than 2 tablets per day, Disp: 60 tablet, Rfl: 1  •  diazePAM (Valium) 10 MG tablet, Take 1 tablet by mouth 2 (Two) Times a Day As Needed., Disp: 60 tablet, Rfl: 1  •  diazePAM (Valium) 10 MG tablet, Take 1 tablet by mouth 2 (Two) Times a Day As Needed., Disp: 60 tablet, Rfl: 1  •  diazePAM (Valium) 10 MG tablet, Take 1 tablet by mouth 2 (Two) Times a Day As Needed., Disp: 60 tablet, Rfl: 1  •  diazePAM (Valium) 10 MG tablet, Take 1 tablet by mouth 2 (Two) Times a Day As Needed., Disp: 60 tablet, Rfl: 1  •  diazePAM (Valium) 10 MG tablet, Take 1 tablet by mouth 2 (Two) Times a Day As Needed., Disp: 60 tablet, Rfl: 1  •  diazePAM (VALIUM) 10 MG tablet, Take 1 tablet by mouth 2 (Two) Times a Day As Needed., Disp: 60 tablet, Rfl: 1  •  diazePAM (Valium) 10 MG tablet, Take 1 tablet by mouth 2 (Two) Times a Day As Needed., Disp: 60 tablet, Rfl: 1  •  gabapentin (NEURONTIN) 600 MG tablet, Take 1 tablet by mouth 3 (Three) Times a Day As Needed for pain, Disp: 90 tablet, Rfl: 5  •  gabapentin (NEURONTIN) 600 MG tablet, Take 1 tablet by mouth 3 (Three) Times a Day., Disp: 90 tablet, Rfl: 5  •  gabapentin (NEURONTIN) 800 MG tablet, Take 1 tablet by mouth 3 (Three) Times a Day., Disp: 90 tablet, Rfl: 5  •  gabapentin (NEURONTIN) 800 MG tablet, Take 1 tablet by mouth 3 (Three) Times a Day., Disp: 90  tablet, Rfl: 5  •  gabapentin (NEURONTIN) 800 MG tablet, Take 1 tablet by mouth 3 (Three) Times a Day., Disp: 90 tablet, Rfl: 5  •  lacosamide (Vimpat) 50 MG tablet tablet, Take 3 tablets by mouth 2 (Two) Times a Day., Disp: 180 tablet, Rfl: 5  •  lamoTRIgine (LaMICtal) 50 MG tablet dispersible disintegrating tablet, Place 3 tablets on the tongue 2 (Two) Times a Day., Disp: 180 tablet, Rfl: 11  •  oxyCODONE-acetaminophen (PERCOCET) 7.5-325 MG per tablet, Take 1 tablet by mouth Every 4 (Four) Hours As Needed For Pain, Disp: 30 tablet, Rfl: 0  •  oxyCODONE-acetaminophen (PERCOCET) 7.5-325 MG per tablet, Take 1 tablet by mouth Every 4 (Four) Hours as needed for pain, Disp: 30 tablet, Rfl: 0  •  oxyCODONE-acetaminophen (PERCOCET) 7.5-325 MG per tablet, Take 1 tablet by mouth Every 4 (Four) Hours As Needed for pain, Disp: 30 tablet, Rfl: 0    Allergies:   No Known Allergies;     Review of Systems: She is nonverbal,  [place] [facility] [place]  Review of Systems   Constitutional: Positive for unexpected weight gain. Negative for appetite change, fatigue, fever and unexpected weight loss.   HENT: Negative for trouble swallowing.    Gastrointestinal: Positive for constipation. Negative for abdominal distention, abdominal pain, anal bleeding, blood in stool, diarrhea, nausea, rectal pain, vomiting, GERD and indigestion.       The following portions of the patient's history were reviewed and updated as appropriate: allergies, current medications, past family history, past medical history, past social history, past surgical history and problem list.    Objective     Physical Exam:  Vital Signs: There were no vitals filed for this visit.    Physical Exam  Constitutional:       Appearance: She is obese.   HENT:      Head: Normocephalic and atraumatic.   Eyes:      Conjunctiva/sclera: Conjunctivae normal.   Abdominal:      General: Abdomen is flat. There is no distension.      Palpations: There is no mass.      Tenderness: There is no  abdominal tenderness. There is no guarding or rebound.      Hernia: No hernia is present.   Musculoskeletal:      Cervical back: Normal range of motion and neck supple.   Neurological:      Mental Status: She is alert.         Results Review:   I reviewed the patient's new clinical results.    No visits with results within 90 Day(s) from this visit.   Latest known visit with results is:   Admission on 02/07/2022, Discharged on 02/07/2022   Component Date Value Ref Range Status   • HCG Qualitative 02/07/2022 Negative  Negative Final   • Case Report 02/07/2022    Final                    Value:Surgical Pathology Report                         Case: [id number]                                  Authorizing Provider:  Salvador Becerra MD  Collected:           02/07/2022 08:19 AM          Ordering Location:     [facility]    Received:            02/07/2022 08:52 AM                                 [facility]                                                                    Pathologist:           Ben Hinson MD                                                       Specimens:   1) - Small Intestine, Duodenum, for celiac                                                          2) - Gastric, Antrum, antrum and body for h. pylori                                                 3) - Esophagus, distal, mid, prox                                                                   4) - Gastric, Body, body and fundus x2                                                    • Final Diagnosis 02/07/2022    Final                    Value:This result contains rich text formatting which cannot be displayed here.      CT Abdomen Pelvis Stone Protocol    Result Date: 10/26/2022  No definite acute findings in the abdomen or pelvis to account for the patient's symptoms.  No nephrolithiasis. No urolithiasis. No hydronephrosis.  There is a large masslike debris with mottled appearance in the gastric lumen, nonspecific, can  be seen in gastric phytobezoar. Correlate clinically.    Images personally reviewed, interpreted and dictated by ANGELA Medrano.       This report was signed and finalized on 10/26/2022 5:05 PM by ANGELA Medrano.    XR Abdomen KUB    Result Date: 12/21/2022  No acute abdominal process identified.  This report was signed and finalized on 12/21/2022 8:57 AM by Shashi Mcneil MD.    XR Abdomen KUB    Result Date: 10/26/2022  Nonobstructive bowel gas pattern. No radiodense renal stones identified on this exam.    Images personally reviewed, interpreted and dictated by ANGELA Medrano.       This report was signed and finalized on 10/26/2022 4:34 PM by ANGELA Medrano.    2/7/2022  - Normal oropharynx.  - Z-line regular, 37 cm from the incisors.  - No gross lesions in esophagus.  - No endoscopic esophageal abnormality to explain patient's dysphagia. Biopsied.  - Two gastric polyps. Resected and retrieved.  - Normal cardia, gastric fundus, gastric body, incisura, antrum and prepyloric region of the stomach except two  small FGP. Biopsied.  - Normal duodenal bulb, first portion of the duodenum, second portion of the duodenum and third portion of the  duodenum. Biopsied.  - No endosocpic findings that could explain her symptoms  - Patient most likely has Functional symptoms.     Path; WNL    Assessment / Plan      1. Gastroesophageal reflux disease without esophagitis  2. Functional dysphagia  3.  Intermittent nausea and belching  4.  Intermittent abdominal cramps  5.  Chronic idiopathic constipation  6.  Abnormal CT abdomen pelvis  7.  Morbid obesity with a BMI 47  1/17/2023  Unfortunately patient gained over 30 pounds recently.  He still has a intermittent belching and abdominal cramps  She had a CT abdomen pelvis done without contrast on 10/18/2022 which revealed a large masslike debris with a mottled appearance in the gastric lumen with a nonspecific could be phytobezoar, commended an EGD.   Patient had a full breakfast in the morning at 7:00am and a CT scan done was 8 AM.  This is most likely a food debris. She had a EGD done in February 2022 which was unremarkable.    Her last blood work was in January 2020 which was normal CBC CMP.    Given these new findings we will schedule her for an EGD for further evaluation  Again discussed small meals  I do see that patient is on Reglan.  Patient does have intermittent involuntary movements at baseline.   I do not recommend Reglan for this patient unless absolutely needed and I do not see any absolute indication.  This medication was not prescribed by me.  This has been discussed with the patient's mom    Continue low-dose PPI.  Okay to take Pepcid as needed  MiraLAX 17 g p.o. daily  Given her obesity, and functional issues, we may not be able to completely fix her upper GI symptoms with the medications.    3/23/2022  Her symptoms significantly improved after dietary changes and starting on a PPI.  EGD done on 2/7/2022 did not reveal any significant abnormalities except for the gland polyps.  Gastric biopsy negative for H. pylori.  Duodenal biopsies were normal.  Recent CBC, CMP, celiac serology in January 2022 was negative.  Esophageal biopsies were normal as well. Ultrasound abdomen done on 1/31/2022 did not reveal any gallbladder pathology and was unremarkable.  As PPI helped her we will continue with the low-dose Prilosec 20 mg p.o. daily for now     Prior history  8. Intellectual disability, profound  Patient is nonverbal.  Patient is from caretaker  Patient has a prior history of renal stones couple of years ago.  Multiple scans and x-rays since then are negative.  Unclear why the patient is getting every monthly x-rays??      Follow Up:   No follow-ups on file.    Salvador Becerra MD  [facility]  1/17/2023  15:29 EST     Please note that portions of this note may have been completed with a voice recognition program.

## 2023-01-17 NOTE — H&P (VIEW-ONLY)
Follow Up Note     Date: 2023   Patient Name: Eboni Hernandez  MRN: [id number]  : 2000     Referring Physician: Yvon Feliciano MD    Chief Complaint:    Chief Complaint   Patient presents with   • Belching   • Heartburn   • Constipation   • Difficulty Swallowing       Interval History:   2023  Eboni Hernandez is a 22 y.o. female who is here today for follow up for her belching, abdominal pain, constipation.  Mom states that she gained over 30 pounds recently.  She still has intermittent belching.  No vomiting.  Has been having daily bowel movement with MiraLAX.    2022  Eboni Hernandez is a 21 y.o. female who is here today to establish care with Gastroenterology for evaluation of acid reflux and heartburn.  Patient has a history of autism and significant intellectual disability, non verbal and seizure disorder.   Her care taker, Aunt says that she has been choking with food and excessive burping since about 2021. She feels that food is not going down. She was started on prilosec 20 mg po daily and was increased to BID since couple of months that is not improving her symptoms. She has nausea and retching.  It is unclear whether she has any abdominal pain as patient is nonverbal.     Her seizure under control as per care taker. She has chronic constipation and takes miralax as needed. She takes percocet as needed.   There is no history of anemia. No prior history of EGD or colonoscopy. No family history of colon cancer. History of stomach cancer remote family member. No history of any abdominal surgery. Denies alcohol abuse or cigarette smoking.      Patient is also on chronic opioids Percocet. She has history of kidney stones.   CT abdomen pelvis done in May 2021 was unremarkable.  CBC CMP was unremarkable 2021  Subjective      Past Medical History:   Past Medical History:   Diagnosis Date   • Anxiety    • Autism    • Chronic pelvic pain in female    •  Communication deficit     non-verbal   • Constipation    • COVID-19 vaccine series completed     moderna   • Developmental delay    • Difficulty swallowing solids    • Encopresis    • Enuresis    • GERD (gastroesophageal reflux disease)    • Global developmental delay    • History of kidney stones     6 WEEKS AGO   • Kidney stones    • Multiple developmental ovarian cysts    • Scoliosis    • Seizures (HCC)     LAST SEIZURE WAS ONE month AGO-PETITE MAL.  LAST GRAND MAL SEIZURE WAS end of november   • Urinary incontinence     wears depends     Past Surgical History:   Past Surgical History:   Procedure Laterality Date   • DIAGNOSTIC LAPAROSCOPY N/A 9/11/2018    Procedure: DIAGNOSTIC LAPAROSCOPY drainage of right ovarian cyst;  Surgeon: Amol Ospina MD;  Location: [facility] OR;  Service: Obstetrics/Gynecology   • ENDOSCOPY N/A 2/7/2022    Procedure: ESOPHAGOGASTRODUODENOSCOPY WITH biopsy and polypectomy;  Surgeon: Salvador Becerra MD;  Location: [facility] ENDOSCOPY;  Service: Gastroenterology;  Laterality: N/A;   • OTHER SURGICAL HISTORY      IUD PLACEMENT IN OR   • URETEROSCOPY LASER LITHOTRIPSY WITH STENT INSERTION  2018   • URETEROSCOPY STENT INSERTION  2020       Family History:   Family History   Problem Relation Age of Onset   • Drug abuse Mother    • Anxiety disorder Mother    • Depression Mother    • Drug abuse Father    • ADD / ADHD Neg Hx    • Alcohol abuse Neg Hx    • Bipolar disorder Neg Hx    • Dementia Neg Hx    • OCD Neg Hx    • Paranoid behavior Neg Hx    • Schizophrenia Neg Hx    • Seizures Neg Hx    • Self-Injurious Behavior  Neg Hx    • Suicide Attempts Neg Hx        Social History:   Social History     Socioeconomic History   • Marital status: Single   Tobacco Use   • Smoking status: Never   • Smokeless tobacco: Never   Substance and Sexual Activity   • Alcohol use: No   • Drug use: No   • Sexual activity: Defer       Medications:     Current Outpatient Medications:   •  Cenobamate (Xcopri)  200 MG tablet, Take 1 tablet by mouth every night at bedtime., Disp: 30 tablet, Rfl: 5  •  cloNIDine (Catapres) 0.3 MG tablet, Take 1 tablet by mouth Every Night., Disp: 30 tablet, Rfl: 5  •  diazePAM (Valium) 10 MG tablet, TAKE 1 TABLET TWICE DAILY AS NEEDED., Disp: 60 tablet, Rfl: 1  •  diazePAM, 20 MG Dose, (Valtoco 20 MG Dose) 2 x 10 MG/0.1ML liquid therapy pack, Instill 1 spray into EACH nostril (1 dose= 2 spray devices) when necessary for seizures, Disp: 4 each, Rfl: 5  •  famotidine (PEPCID) 20 MG tablet, Take 1 tablet by mouth 2 (Two) Times a Day., Disp: 180 tablet, Rfl: 6  •  gabapentin (NEURONTIN) 600 MG tablet, Take 1 tablet by mouth 3 (Three) Times a Day., Disp: 90 tablet, Rfl: 5  •  lamoTRIgine (LaMICtal) 50 MG tablet dispersible disintegrating tablet, Place 3 tablets on the tongue 2 (two) times a day., Disp: 180 tablet, Rfl: 11  •  lamoTRIgine (LaMICtal) 50 MG tablet dispersible disintegrating tablet, Place 3 tablets on the tongue 2 (Two) Times a Day., Disp: 180 tablet, Rfl: 5  •  metoclopramide (REGLAN) 5 MG tablet, Take 1 tablet by mouth 4 (Four) Times a Day before meals, Disp: 120 tablet, Rfl: 6  •  Midazolam (Nayzilam) 5 MG/0.1ML solution, Instill 1 spray in nostril if needed for seizure, may repeat in opposite nostril in 10 minutes if needed, Disp: 4 each, Rfl: 5  •  omeprazole (priLOSEC) 20 MG capsule, Take 1 capsule by mouth Daily., Disp: 90 capsule, Rfl: 3  •  ondansetron (Zofran) 4 MG tablet, Take 1 tablet by mouth Every 12 (Twelve) Hours As Needed for Nausea or Vomiting., Disp: 30 tablet, Rfl: 1  •  oxyCODONE-acetaminophen (PERCOCET) 7.5-325 MG per tablet, TAKE 1 TABLET EVERY 4 HOURS AS NEEDED FOR PAIN., Disp: 30 tablet, Rfl: 0  •  polyethylene glycol (MIRALAX) 17 g packet, Take 17 g by mouth Daily., Disp: , Rfl:   •  risperiDONE (RisperDAL) 2 MG tablet, Take 1 tablet by mouth 2 (Two) Times a Day., Disp: 60 tablet, Rfl: 5  •  calcium carbonate (TUMS) 500 MG chewable tablet, Chew 1 tablet  Daily., Disp: , Rfl:   •  Cenobamate (Xcopri) 14 x 150 MG & 14 x200 MG tablet therapy pack, Pack #2:  Take 150 MG tablet once a day for 14 days then take 200 MG tablet for 14 days, Disp: 28 each, Rfl: 0  •  Cenobamate (Xcopri) 200 MG tablet, Take 1 tablet by mouth every night at bedtime., Disp: 30 tablet, Rfl: 5  •  Cenobamate (Xcopri) 200 MG tablet, TAKE ONE TABLET BY MOUTH AT BEDTIME, Disp: 30 tablet, Rfl: 0  •  Cenobamate (Xcopri) 200 MG tablet, Take 1 tablet by mouth every night at bedtime., Disp: 30 tablet, Rfl: 5  •  diazePAM (VALIUM) 10 MG tablet, Take 1 tablet by mouth 2 (Two) Times a Day As Needed for anxiety, No more than 2 tablets per day, Disp: 60 tablet, Rfl: 1  •  diazePAM (Valium) 10 MG tablet, Take 1 tablet by mouth 2 (Two) Times a Day As Needed., Disp: 60 tablet, Rfl: 1  •  diazePAM (Valium) 10 MG tablet, Take 1 tablet by mouth 2 (Two) Times a Day As Needed., Disp: 60 tablet, Rfl: 1  •  diazePAM (Valium) 10 MG tablet, Take 1 tablet by mouth 2 (Two) Times a Day As Needed., Disp: 60 tablet, Rfl: 1  •  diazePAM (Valium) 10 MG tablet, Take 1 tablet by mouth 2 (Two) Times a Day As Needed., Disp: 60 tablet, Rfl: 1  •  diazePAM (Valium) 10 MG tablet, Take 1 tablet by mouth 2 (Two) Times a Day As Needed., Disp: 60 tablet, Rfl: 1  •  diazePAM (VALIUM) 10 MG tablet, Take 1 tablet by mouth 2 (Two) Times a Day As Needed., Disp: 60 tablet, Rfl: 1  •  diazePAM (Valium) 10 MG tablet, Take 1 tablet by mouth 2 (Two) Times a Day As Needed., Disp: 60 tablet, Rfl: 1  •  gabapentin (NEURONTIN) 600 MG tablet, Take 1 tablet by mouth 3 (Three) Times a Day As Needed for pain, Disp: 90 tablet, Rfl: 5  •  gabapentin (NEURONTIN) 600 MG tablet, Take 1 tablet by mouth 3 (Three) Times a Day., Disp: 90 tablet, Rfl: 5  •  gabapentin (NEURONTIN) 800 MG tablet, Take 1 tablet by mouth 3 (Three) Times a Day., Disp: 90 tablet, Rfl: 5  •  gabapentin (NEURONTIN) 800 MG tablet, Take 1 tablet by mouth 3 (Three) Times a Day., Disp: 90  tablet, Rfl: 5  •  gabapentin (NEURONTIN) 800 MG tablet, Take 1 tablet by mouth 3 (Three) Times a Day., Disp: 90 tablet, Rfl: 5  •  lacosamide (Vimpat) 50 MG tablet tablet, Take 3 tablets by mouth 2 (Two) Times a Day., Disp: 180 tablet, Rfl: 5  •  lamoTRIgine (LaMICtal) 50 MG tablet dispersible disintegrating tablet, Place 3 tablets on the tongue 2 (Two) Times a Day., Disp: 180 tablet, Rfl: 11  •  oxyCODONE-acetaminophen (PERCOCET) 7.5-325 MG per tablet, Take 1 tablet by mouth Every 4 (Four) Hours As Needed For Pain, Disp: 30 tablet, Rfl: 0  •  oxyCODONE-acetaminophen (PERCOCET) 7.5-325 MG per tablet, Take 1 tablet by mouth Every 4 (Four) Hours as needed for pain, Disp: 30 tablet, Rfl: 0  •  oxyCODONE-acetaminophen (PERCOCET) 7.5-325 MG per tablet, Take 1 tablet by mouth Every 4 (Four) Hours As Needed for pain, Disp: 30 tablet, Rfl: 0    Allergies:   No Known Allergies;     Review of Systems: She is nonverbal,  [place] [facility] [place] of Systems   Constitutional: Positive for unexpected weight gain. Negative for appetite change, fatigue, fever and unexpected weight loss.   HENT: Negative for trouble swallowing.    Gastrointestinal: Positive for constipation. Negative for abdominal distention, abdominal pain, anal bleeding, blood in stool, diarrhea, nausea, rectal pain, vomiting, GERD and indigestion.       The following portions of the patient's history were reviewed and updated as appropriate: allergies, current medications, past family history, past medical history, past social history, past surgical history and problem list.    Objective     Physical Exam:  Vital Signs: There were no vitals filed for this visit.    Physical Exam  Constitutional:       Appearance: She is obese.   HENT:      Head: Normocephalic and atraumatic.   Eyes:      Conjunctiva/sclera: Conjunctivae normal.   Abdominal:      General: Abdomen is flat. There is no distension.      Palpations: There is no mass.      Tenderness: There is no  abdominal tenderness. There is no guarding or rebound.      Hernia: No hernia is present.   Musculoskeletal:      Cervical back: Normal range of motion and neck supple.   Neurological:      Mental Status: She is alert.         Results Review:   I reviewed the patient's new clinical results.    No visits with results within 90 Day(s) from this visit.   Latest known visit with results is:   Admission on 02/07/2022, Discharged on 02/07/2022   Component Date Value Ref Range Status   • HCG Qualitative 02/07/2022 Negative  Negative Final   • Case Report 02/07/2022    Final                    Value:Surgical Pathology Report                         Case: [id number]                                  Authorizing Provider:  Salvador Becerra MD  Collected:           02/07/2022 08:19 AM          Ordering Location:     [facility]    Received:            02/07/2022 08:52 AM                                 [facility]                                                                    Pathologist:           Ben Hinson MD                                                       Specimens:   1) - Small Intestine, Duodenum, for celiac                                                          2) - Gastric, Antrum, antrum and body for h. pylori                                                 3) - Esophagus, distal, mid, prox                                                                   4) - Gastric, Body, body and fundus x2                                                    • Final Diagnosis 02/07/2022    Final                    Value:This result contains rich text formatting which cannot be displayed here.      CT Abdomen Pelvis Stone Protocol    Result Date: 10/26/2022  No definite acute findings in the abdomen or pelvis to account for the patient's symptoms.  No nephrolithiasis. No urolithiasis. No hydronephrosis.  There is a large masslike debris with mottled appearance in the gastric lumen, nonspecific, can  be seen in gastric phytobezoar. Correlate clinically.    Images personally reviewed, interpreted and dictated by ANGELA Medrano.       This report was signed and finalized on 10/26/2022 5:05 PM by ANGELA Medrano.    XR Abdomen KUB    Result Date: 12/21/2022  No acute abdominal process identified.  This report was signed and finalized on 12/21/2022 8:57 AM by Shashi Mcneil MD.    XR Abdomen KUB    Result Date: 10/26/2022  Nonobstructive bowel gas pattern. No radiodense renal stones identified on this exam.    Images personally reviewed, interpreted and dictated by ANGELA Medrano.       This report was signed and finalized on 10/26/2022 4:34 PM by ANGELA Medrano.    2/7/2022  - Normal oropharynx.  - Z-line regular, 37 cm from the incisors.  - No gross lesions in esophagus.  - No endoscopic esophageal abnormality to explain patient's dysphagia. Biopsied.  - Two gastric polyps. Resected and retrieved.  - Normal cardia, gastric fundus, gastric body, incisura, antrum and prepyloric region of the stomach except two  small FGP. Biopsied.  - Normal duodenal bulb, first portion of the duodenum, second portion of the duodenum and third portion of the  duodenum. Biopsied.  - No endosocpic findings that could explain her symptoms  - Patient most likely has Functional symptoms.     Path; WNL    Assessment / Plan      1. Gastroesophageal reflux disease without esophagitis  2. Functional dysphagia  3.  Intermittent nausea and belching  4.  Intermittent abdominal cramps  5.  Chronic idiopathic constipation  6.  Abnormal CT abdomen pelvis  7.  Morbid obesity with a BMI 47  1/17/2023  Unfortunately patient gained over 30 pounds recently.  He still has a intermittent belching and abdominal cramps  She had a CT abdomen pelvis done without contrast on 10/18/2022 which revealed a large masslike debris with a mottled appearance in the gastric lumen with a nonspecific could be phytobezoar, commended an EGD.   Patient had a full breakfast in the morning at 7:00am and a CT scan done was 8 AM.  This is most likely a food debris. She had a EGD done in February 2022 which was unremarkable.    Her last blood work was in January 2020 which was normal CBC CMP.    Given these new findings we will schedule her for an EGD for further evaluation  Again discussed small meals  I do see that patient is on Reglan.  Patient does have intermittent involuntary movements at baseline.   I do not recommend Reglan for this patient unless absolutely needed and I do not see any absolute indication.  This medication was not prescribed by me.  This has been discussed with the patient's mom    Continue low-dose PPI.  Okay to take Pepcid as needed  MiraLAX 17 g p.o. daily  Given her obesity, and functional issues, we may not be able to completely fix her upper GI symptoms with the medications.    3/23/2022  Her symptoms significantly improved after dietary changes and starting on a PPI.  EGD done on 2/7/2022 did not reveal any significant abnormalities except for the gland polyps.  Gastric biopsy negative for H. pylori.  Duodenal biopsies were normal.  Recent CBC, CMP, celiac serology in January 2022 was negative.  Esophageal biopsies were normal as well. Ultrasound abdomen done on 1/31/2022 did not reveal any gallbladder pathology and was unremarkable.  As PPI helped her we will continue with the low-dose Prilosec 20 mg p.o. daily for now     Prior history  8. Intellectual disability, profound  Patient is nonverbal.  Patient is from caretaker  Patient has a prior history of renal stones couple of years ago.  Multiple scans and x-rays since then are negative.  Unclear why the patient is getting every monthly x-rays??      Follow Up:   No follow-ups on file.    Salvador Becerra MD  [facility] [place]  1/17/2023  15:29 EST     Please note that portions of this note may have been completed with a voice recognition program.

## 2023-01-18 NOTE — PRE-PROCEDURE INSTRUCTIONS
PAT phone history completed with pt for upcoming procedure on 1/20/23 with Dr. Becerra.      PAT PASS GIVEN/REVIEWED WITH PT.  VERBALIZED UNDERSTANDING OF THE FOLLOWING:  DO NOT EAT, DRINK, SMOKE, USE SMOKELESS TOBACCO OR CHEW GUM AFTER MIDNIGHT THE NIGHT BEFORE SURGERY.  THIS ALSO INCLUDES HARD CANDIES AND MINTS.    DO NOT SHAVE THE AREA TO BE OPERATED ON AT LEAST 48 HOURS PRIOR TO THE PROCEDURE.  DO NOT WEAR MAKE UP OR NAIL POLISH.  DO NOT LEAVE IN ANY PIERCING OR WEAR JEWELRY THE DAY OF SURGERY.      DO NOT USE ADHESIVES IF YOU WEAR DENTURES.    DO NOT WEAR EYE CONTACTS; BRING IN YOUR GLASSES.    ONLY TAKE MEDICATION THE MORNING OF YOUR PROCEDURE IF INSTRUCTED BY YOUR SURGEON WITH ENOUGH WATER TO SWALLOW THE MEDICATION.  IF YOUR SURGEON DID NOT SPECIFY WHICH MEDICATIONS TO TAKE, YOU WILL NEED TO CALL THEIR OFFICE FOR FURTHER INSTRUCTIONS AND DO AS THEY INSTRUCT.    LEAVE ANYTHING YOU CONSIDER VALUABLE AT HOME.    YOU WILL NEED TO ARRANGE FOR SOMEONE TO DRIVE YOU HOME AFTER SURGERY.  IT IS RECOMMENDED THAT YOU DO NOT DRIVE, WORK, DRINK ALCOHOL OR MAKE MAJOR DECISIONS FOR AT LEAST 24 HOURS AFTER YOUR PROCEDURE IS COMPLETE.      THE DAY OF YOUR PROCEDURE, BRING IN THE FOLLOWING IF APPLICABLE:   PICTURE ID AND INSURANCE/MEDICARE OR MEDICAID CARDS/ANY CO-PAY THAT MAY BE DUE   COPY OF ADVANCED DIRECTIVE/LIVING WILL/POWER OR    CPAP/BIPAP/INHALERS   SKIN PREP SHEET   YOUR PREADMISSION TESTING PASS (IF NOT A PHONE HISTORY)

## 2023-01-20 ENCOUNTER — HOSPITAL ENCOUNTER (OUTPATIENT)
Facility: HOSPITAL | Age: 23
Setting detail: HOSPITAL OUTPATIENT SURGERY
Discharge: HOME | End: 2023-01-20
Attending: INTERNAL MEDICINE | Admitting: INTERNAL MEDICINE
Payer: MEDICARE

## 2023-01-20 ENCOUNTER — ANESTHESIA (OUTPATIENT)
Dept: GASTROENTEROLOGY | Facility: HOSPITAL | Age: 23
End: 2023-01-20
Payer: MEDICARE

## 2023-01-20 ENCOUNTER — ANESTHESIA EVENT (OUTPATIENT)
Dept: GASTROENTEROLOGY | Facility: HOSPITAL | Age: 23
End: 2023-01-20
Payer: MEDICARE

## 2023-01-20 VITALS
BODY MASS INDEX: 45.99 KG/M2 | WEIGHT: 293 LBS | SYSTOLIC BLOOD PRESSURE: 116 MMHG | DIASTOLIC BLOOD PRESSURE: 80 MMHG | RESPIRATION RATE: 20 BRPM | TEMPERATURE: 98.6 F | HEART RATE: 90 BPM | HEIGHT: 67 IN | OXYGEN SATURATION: 97 %

## 2023-01-20 DIAGNOSIS — R93.5 ABNORMAL CT OF THE ABDOMEN: ICD-10-CM

## 2023-01-20 LAB — HCG SERPL QL: NEGATIVE

## 2023-01-20 PROCEDURE — 84703 CHORIONIC GONADOTROPIN ASSAY: CPT | Performed by: INTERNAL MEDICINE

## 2023-01-20 PROCEDURE — 43239 EGD BIOPSY SINGLE/MULTIPLE: CPT | Performed by: INTERNAL MEDICINE

## 2023-01-20 PROCEDURE — 25010000002 PROPOFOL 200 MG/20ML EMULSION: Performed by: NURSE ANESTHETIST, CERTIFIED REGISTERED

## 2023-01-20 PROCEDURE — 88305 TISSUE EXAM BY PATHOLOGIST: CPT

## 2023-01-20 RX ORDER — SIMETHICONE 20 MG/.3ML
EMULSION ORAL AS NEEDED
Status: DISCONTINUED | OUTPATIENT
Start: 2023-01-20 | End: 2023-01-20 | Stop reason: HOSPADM

## 2023-01-20 RX ORDER — SODIUM CHLORIDE 9 MG/ML
70 INJECTION, SOLUTION INTRAVENOUS CONTINUOUS PRN
Status: DISCONTINUED | OUTPATIENT
Start: 2023-01-20 | End: 2023-01-20 | Stop reason: HOSPADM

## 2023-01-20 RX ORDER — PROPOFOL 10 MG/ML
INJECTION, EMULSION INTRAVENOUS AS NEEDED
Status: DISCONTINUED | OUTPATIENT
Start: 2023-01-20 | End: 2023-01-20 | Stop reason: SURG

## 2023-01-20 RX ORDER — KETAMINE HCL IN NACL, ISO-OSM 100MG/10ML
SYRINGE (ML) INJECTION AS NEEDED
Status: DISCONTINUED | OUTPATIENT
Start: 2023-01-20 | End: 2023-01-20 | Stop reason: SURG

## 2023-01-20 RX ORDER — LIDOCAINE HYDROCHLORIDE 20 MG/ML
INJECTION, SOLUTION INTRAVENOUS AS NEEDED
Status: DISCONTINUED | OUTPATIENT
Start: 2023-01-20 | End: 2023-01-20 | Stop reason: SURG

## 2023-01-20 RX ADMIN — PROPOFOL 50 MG: 10 INJECTION, EMULSION INTRAVENOUS at 08:06

## 2023-01-20 RX ADMIN — PROPOFOL 50 MG: 10 INJECTION, EMULSION INTRAVENOUS at 08:03

## 2023-01-20 RX ADMIN — SODIUM CHLORIDE: 9 INJECTION, SOLUTION INTRAVENOUS at 07:59

## 2023-01-20 RX ADMIN — Medication 20 MG: at 08:01

## 2023-01-20 RX ADMIN — LIDOCAINE HYDROCHLORIDE 60 MG: 20 INJECTION, SOLUTION INTRAVENOUS at 08:01

## 2023-01-20 NOTE — ANESTHESIA PREPROCEDURE EVALUATION
Anesthesia Evaluation     Patient summary reviewed and Nursing notes reviewed   no history of anesthetic complications:  NPO Solid Status: > 8 hours  NPO Liquid Status: > 8 hours           Airway   Mallampati: II  TM distance: >3 FB  Neck ROM: full  Possible difficult intubation  Dental - normal exam     Pulmonary - negative pulmonary ROS and normal exam   (-) not a smoker  Cardiovascular - negative cardio ROS and normal exam    Rhythm: regular  Rate: normal        Neuro/Psych  (+) seizures, psychiatric history Anxiety and Depression,    GI/Hepatic/Renal/Endo    (+) obesity, morbid obesity, GERD,  renal disease CRI and stones,     Musculoskeletal (-) negative ROS    Abdominal   (+) obese,    Substance History - negative use     OB/GYN negative ob/gyn ROS         Other - negative ROS       ROS/Med Hx Other: Hx Autism    Developmental Delay                  Anesthesia Plan    ASA 3     MAC     (Pt told that intravenous sedation will be used as the primary anesthetic along with local anesthesia if necessary. Every effort will be made to make sure the patient is comfortable.     Risks and benefits discussed including risk of aspiration, recall and dental damage. All patient questions answered.    Will continue with plan of care.  )  intravenous induction     Anesthetic plan, risks, benefits, and alternatives have been provided, discussed and informed consent has been obtained with: patient.  Pre-procedure education provided  Plan discussed with CRNA.        CODE STATUS:

## 2023-01-20 NOTE — ANESTHESIA POSTPROCEDURE EVALUATION
Patient: Eboni Hernandez    Procedure Summary     Date: 01/20/23 Room / Location: [facility] ENDOSCOPY 2 / [facility] ENDOSCOPY    Anesthesia Start: 0758 Anesthesia Stop: 0814    Procedure: ESOPHAGOGASTRODUODENOSCOPY with biopsy (Esophagus) Diagnosis:       Abnormal CT of the abdomen      (Abnormal CT of the abdomen [R93.5])    Surgeons: Salvador Becerra MD Provider: Kalen Ferrer CRNA    Anesthesia Type: MAC ASA Status: 3          Anesthesia Type: MAC    Vitals  No vitals data found for the desired time range.          Post Anesthesia Care and Evaluation    Patient location during evaluation: bedside  Patient participation: complete - patient participated  Level of consciousness: awake and alert  Pain score: 0  Pain management: adequate    Airway patency: patent  Anesthetic complications: No anesthetic complications  PONV Status: none  Cardiovascular status: acceptable  Respiratory status: acceptable  Hydration status: acceptable

## 2023-01-20 NOTE — DISCHARGE INSTRUCTIONS
- Discharge patient to home (ambulatory).   - Low fiber small meals.   - Consider non surgical weight loss program referral  - Continue present medications.   - Await pathology results.   - Return to my office in 8 weeks.     To assist you in voiding:  Drink plenty of fluids  Listen to running water while attempting to void.    If you are unable to urinate and you have an uncomfortable urge to void or it has been   6 hours since you were discharged, return to the Emergency Room Please follow all post op instructions and follow up appointment time from your physician's office included in your discharge packet.  .  Rest today  No pushing,pulling,tugging,heavy lifting, or strenuous activity   No major decision making,driving,or drinking alcoholic beverages for 24 hours due to the sedation you received  Always use good hand hygiene/washing technique  No driving on pain medication.

## 2023-01-23 LAB — REF LAB TEST METHOD: NORMAL

## 2023-02-15 DIAGNOSIS — F84.0 AUTISM SPECTRUM DISORDER REQUIRING VERY SUBSTANTIAL SUPPORT (LEVEL 3): ICD-10-CM

## 2023-02-15 DIAGNOSIS — R46.89 AGGRESSION: ICD-10-CM

## 2023-02-15 RX ORDER — RISPERIDONE 2 MG/1
2 TABLET ORAL 2 TIMES DAILY
Qty: 60 TABLET | Refills: 5 | Status: SHIPPED | OUTPATIENT
Start: 2023-02-15 | End: 2023-03-03 | Stop reason: SDUPTHER

## 2023-02-15 NOTE — TELEPHONE ENCOUNTER
Rx Refill Note  Requested Prescriptions     Pending Prescriptions Disp Refills   • risperiDONE (RisperDAL) 2 MG tablet 60 tablet 5     Sig: Take 1 tablet by mouth 2 (Two) Times a Day.      Last office visit with prescribing clinician: 7/22/2022      Next office visit with prescribing clinician: 3/3/2023            Eugenia Armendariz MA  02/15/23, 09:51 EST

## 2023-02-21 DIAGNOSIS — F84.0 AUTISM SPECTRUM DISORDER REQUIRING VERY SUBSTANTIAL SUPPORT (LEVEL 3): ICD-10-CM

## 2023-02-21 DIAGNOSIS — R46.89 AGGRESSION: ICD-10-CM

## 2023-02-21 DIAGNOSIS — F41.8 OTHER SPECIFIED ANXIETY DISORDERS: ICD-10-CM

## 2023-02-21 RX ORDER — CLONIDINE HYDROCHLORIDE 0.3 MG/1
0.3 TABLET ORAL NIGHTLY
Qty: 30 TABLET | Refills: 5 | Status: SHIPPED | OUTPATIENT
Start: 2023-02-21

## 2023-02-21 NOTE — TELEPHONE ENCOUNTER
Rx Refill Note  Requested Prescriptions     Pending Prescriptions Disp Refills   • cloNIDine (Catapres) 0.3 MG tablet 30 tablet 5     Sig: Take 1 tablet by mouth Every Night.      Last office visit with prescribing clinician: 7/22/2022      Next office visit with prescribing clinician: 3/3/2023            Eugenia Armendariz MA  02/21/23, 13:20 EST

## 2023-03-03 ENCOUNTER — OFFICE VISIT (OUTPATIENT)
Dept: PSYCHIATRY | Facility: CLINIC | Age: 23
End: 2023-03-03
Payer: MEDICARE

## 2023-03-03 DIAGNOSIS — R46.89 AGGRESSION: ICD-10-CM

## 2023-03-03 DIAGNOSIS — F84.0 AUTISM SPECTRUM DISORDER REQUIRING VERY SUBSTANTIAL SUPPORT (LEVEL 3): Primary | ICD-10-CM

## 2023-03-03 DIAGNOSIS — F79 INTELLECTUAL DISABILITY: ICD-10-CM

## 2023-03-03 DIAGNOSIS — F41.8 OTHER SPECIFIED ANXIETY DISORDERS: ICD-10-CM

## 2023-03-03 PROCEDURE — 99214 OFFICE O/P EST MOD 30 MIN: CPT | Performed by: PSYCHIATRY & NEUROLOGY

## 2023-03-03 RX ORDER — RISPERIDONE 2 MG/1
2 TABLET ORAL NIGHTLY
Qty: 90 TABLET | Refills: 0 | Status: SHIPPED | OUTPATIENT
Start: 2023-03-03

## 2023-03-10 NOTE — PROGRESS NOTES
Subjective   Eboni Hernandez is a 22 y.o. female who presents today for follow up    This provider is located at [facility] at [address] [place] [address]. The provider identified himself as well as his credentials.   The Patient is at home using her phone because problems with video connection. The patient's condition being diagnosed/treated is appropriate for telemedicine. The patient and guardian gave consent to be seen remotely, and when consent is given they understand that the consent allows for patient identifiable information to be sent to a third party as needed.   They may refuse to be seen remotely at any time. The electronic data is encrypted and password protected, and the patient has been advised of the potential risks to privacy not withstanding such measures        Chief Complaint: Intellectual disability and autism spectrum disorder    History of Present Illness: Patient relatively stable from a mental health standpoint.  She was found to have a mass in her abdomen which they feel may be a food packet which was treated and has improved.  Her kidney function is worsening and she continues to gain weight.  Patient does not like to walk very much but she is eating healthy and is not overeating.  She continues to have weight gain and retention despite this.  Given her long term stability, her weight retention may be somewhat related to Risperdal and as she has been stable, we may be able to reduce the dose without having an exacerbation of symptoms.  She is not having any other medication side effects.  Sleep and appetite are stable.      The following portions of the patient's history were reviewed and updated as appropriate: allergies, current medications, past family history, past medical history, past social history, past surgical history and problem list.      Past Medical History:  Past Medical History:   Diagnosis Date   • Anxiety    • Autism    • Chronic  pelvic pain in female    • Communication deficit     non-verbal   • Constipation    • COVID-19 vaccine series completed     moderna   • Developmental delay    • Difficulty swallowing solids     pills/medications   • Encopresis    • Enuresis    • GERD (gastroesophageal reflux disease)    • Global developmental delay    • Kidney stones    • Multiple developmental ovarian cysts    • Nexplanon in place     left arm   • Scoliosis    • Seizures (HCC)     Last seizure was around the 1st of Jan 2023   • Urinary incontinence        Social History:  Social History     Socioeconomic History   • Marital status: Single   Tobacco Use   • Smoking status: Never   • Smokeless tobacco: Never   Vaping Use   • Vaping Use: Never used   Substance and Sexual Activity   • Alcohol use: No   • Drug use: No   • Sexual activity: Defer       Family History:  Family History   Problem Relation Age of Onset   • Drug abuse Mother    • Anxiety disorder Mother    • Depression Mother    • Drug abuse Father    • Colon cancer Maternal Grandfather    • ADD / ADHD Neg Hx    • Alcohol abuse Neg Hx    • Bipolar disorder Neg Hx    • Dementia Neg Hx    • OCD Neg Hx    • Paranoid behavior Neg Hx    • Schizophrenia Neg Hx    • Seizures Neg Hx    • Self-Injurious Behavior  Neg Hx    • Suicide Attempts Neg Hx        Past Surgical History:  Past Surgical History:   Procedure Laterality Date   • DIAGNOSTIC LAPAROSCOPY N/A 09/11/2018    Procedure: DIAGNOSTIC LAPAROSCOPY drainage of right ovarian cyst;  Surgeon: Amol Ospina MD;  Location: [facility] OR;  Service: Obstetrics/Gynecology   • ENDOSCOPY N/A 02/07/2022    Procedure: ESOPHAGOGASTRODUODENOSCOPY WITH biopsy and polypectomy;  Surgeon: Salvador Becerra MD;  Location: [facility] ENDOSCOPY;  Service: Gastroenterology;  Laterality: N/A;   • ENDOSCOPY N/A 1/20/2023    Procedure: ESOPHAGOGASTRODUODENOSCOPY with biopsy;  Surgeon: Salvador Becerra MD;  Location: [facility] ENDOSCOPY;  Service:  Gastroenterology;  Laterality: N/A;   • OTHER SURGICAL HISTORY      IUD PLACEMENT IN OR   • OTHER SURGICAL HISTORY      nexplanon   • URETEROSCOPY LASER LITHOTRIPSY WITH STENT INSERTION  2018   • URETEROSCOPY STENT INSERTION  2020       Problem List:  Patient Active Problem List   Diagnosis   • Problems with communication   • Intellectual disability, profound   • Encopresis   • Enuresis   • Aggression   • Insomnia due to other mental disorder   • Autism spectrum disorder   • Cyst of ovary   • Pelvic pain   • Nephrolithiasis   • Nexplanon in place   • Seizure disorder (HCC)   • Gastroesophageal reflux disease without esophagitis   • Dysphagia   • Nausea   • Abnormal CT of the abdomen       Allergy:   No Known Allergies     Current Medications:   Current Outpatient Medications   Medication Sig Dispense Refill   • risperiDONE (RisperDAL) 2 MG tablet Take 1 tablet by mouth Every Night. 90 tablet 0   • Cenobamate (Xcopri) 200 MG tablet Take 1 tablet by mouth every night at bedtime. 30 tablet 5   • cloNIDine (Catapres) 0.3 MG tablet Take 1 tablet by mouth Every Night. 30 tablet 5   • diazePAM (Valium) 10 MG tablet TAKE 1 TABLET TWICE DAILY AS NEEDED. 60 tablet 1   • diazePAM (Valium) 10 MG tablet Take 1 tablet by mouth 2 (Two) Times a Day As Needed. 60 tablet 1   • Etonogestrel (NEXPLANON SC) Inject  under the skin into the appropriate area as directed.     • gabapentin (NEURONTIN) 600 MG tablet Take 1 tablet by mouth 3 (Three) Times a Day. (Patient taking differently: Take 600 mg by mouth 3 (Three) Times a Day As Needed.) 90 tablet 5   • lamoTRIgine (LaMICtal) 50 MG tablet dispersible disintegrating tablet Place 3 tablets on the tongue 2 (Two) Times a Day. 180 tablet 5   • Levonorgestrel (MIRENA) 20 MCG/DAY intrauterine device IUD 1 each by Intrauterine route.     • metoclopramide (REGLAN) 5 MG tablet Take 1 tablet by mouth 4 (Four) Times a Day before meals 120 tablet 6   • Midazolam (Nayzilam) 5 MG/0.1ML solution  Instill 1 spray in nostril if needed for seizure, may repeat in opposite nostril in 10 minutes if needed 4 each 5   • omeprazole (priLOSEC) 20 MG capsule Take 1 capsule by mouth Daily. 90 capsule 3   • ondansetron (Zofran) 4 MG tablet Take 1 tablet by mouth Every 12 (Twelve) Hours As Needed for Nausea or Vomiting. 30 tablet 1   • oxyCODONE-acetaminophen (PERCOCET) 7.5-325 MG per tablet TAKE 1 TABLET EVERY 4 HOURS AS NEEDED FOR PAIN. 30 tablet 0   • oxyCODONE-acetaminophen (PERCOCET) 7.5-325 MG per tablet Take 1 tablet by mouth Every 4 (Four) Hours As Needed For Pain 30 tablet 0   • polyethylene glycol (MIRALAX) 17 g packet Take 17 g by mouth Daily.       No current facility-administered medications for this visit.       Review of Symptoms:    Review of Systems   Constitutional: Positive for unexpected weight gain. Negative for activity change.   HENT: Negative.    Eyes: Negative.    Respiratory: Negative.    Cardiovascular: Negative.    Gastrointestinal: Negative.  Positive for GERD.   Endocrine: Negative.    Genitourinary: Negative.    Musculoskeletal: Negative.    Allergic/Immunologic: Negative.    Neurological: Positive for seizures and speech difficulty (nonverbal).   Hematological: Negative.    Psychiatric/Behavioral: Negative for agitation, behavioral problems, sleep disturbance and negative for hyperactivity. The patient is not nervous/anxious.          Physical Exam:   not currently breastfeeding.  Unable to assess, telephone visit    Appearance: Unable to assess, telephone visit  Gait, Station, Strength: Unable to assess, telephone visit    Mental Status Exam:     Mental Status exam performed 07/22/2022  and patient shows no significant changes from previous exam.     Hygiene:   Unable to assess, telephone visit  Cooperation:  apathetic, unengaged  Eye Contact:  Unable to assess, telephone visit  Psychomotor Behavior:  Unable to assess, telephone visit  Affect:  Blunted  Mood: normal  Speech:  Mute and lack  of meaningful speech, makes noises  Thought Process:  Unable to demonstrate  Thought Content:  Unable to demonstrate  Suicidal:  unable to assess  Homicidal:  unable to assess  Hallucinations:  unable to assess  Delusion:  Unable to demonstrate  Memory:  Unable to evaluate  Orientation:  Unable to evaluate  Reliability:  unable to assess  Insight:  unable to assess  Judgement:  Impaired  Impulse Control:  Impaired  Physical/Medical Issues:  Yes seizure disorder       Lab Results:   No visits with results within 1 Month(s) from this visit.   Latest known visit with results is:   Admission on 2023, Discharged on 2023   Component Date Value Ref Range Status   • HCG Qualitative 2023 Negative  Negative Final   • Reference Lab Report 2023    Final                    Value:[facility]  [address] [place] [address]  Phone: [phone] or [phone]  Fax: [phone]  Mega Marcos M.D., Medical Director    PATIENT NAME                                     LABORATORY NO.  SELINA SANDOVAL                              [id number]  [id number]                                 AGE                    SEX   SSN              CLIENT REF #  [place] [id number]        2000      F     [id number]      [id number]    [address]                        REQUESTING M.D.           ATTENDING M.D.         COPY TO.  [address]                                [place] [address]                         [facility]  DATE COLLECTED            DATE RECEIVED          DATE REPORTED  2023    DIAGNOSIS:  ANTRUM AND BODY, BIOPSY:  Gastric antral and fundic type mucosa with mild chronic inactive                           gastritis  Negative for intestinal metaplasia or dysplasia  No Helicobacter pylori-like organisms seen    [facility]    CLINICAL  "HISTORY:  Abnormal CT of the abdomen    SPECIMENS RECEIVED:  ANTRUM AND BODY, BIOPSY    MICROSCOPIC DESCRIPTION:  Tissue blocks are prepared and slides are examined microscopically on all  specimens. See diagnosis for details.    Professional interpretation rendered by Ben Nickerson M.D., MELIZA at [facility], [address] [place] [address].    GROSS DESCRIPTION:  Specimen is received in 1 formalin filled container labeled \"gastric antrum and  body\" and consists of multiple portions of tan soft tissue measuring 0.5 x 0.4 x  0.2 cm in aggregate.  Submitted entirely in 1 cassette.  [facility]    REVIEWED, DIAGNOSED AND ELECTRONICALLY  SIGNED BY:    Ben Nickerson M.D., WILLIAM.  CPT CODES:  95184         Assessment & Plan   Diagnoses and all orders for this visit:    1. Autism spectrum disorder requiring very substantial support (level 3) (Primary)  -     risperiDONE (RisperDAL) 2 MG tablet; Take 1 tablet by mouth Every Night.  Dispense: 90 tablet; Refill: 0    2. Aggression  -     risperiDONE (RisperDAL) 2 MG tablet; Take 1 tablet by mouth Every Night.  Dispense: 90 tablet; Refill: 0    3. Other specified anxiety disorders    4. Intellectual disability, profound    -Patient stable from mental health standpoint but continues to have weight gain and retention and other medical issues.  Given her long term stability, we may be able to reduce her Risperdal which may be contributing to her weight retention as her appetite is stable and she does not overeat or eat unhealthy foods for the most part.  -Reviewed previous documentation  -Reviewed most recent available labs  -Sent for recent laboratory studies   -DOUGLAS reviewed and appropriate. Patient counseled on use of controlled substances.   -Reduce Risperdal 2 mg twice daily to 2 mg nightly for autism spectrum disorder, behavioral disturbance, and anxiety  -Continue diazepam 10 mg p.o. 2 times daily as needed for anxiety.  Patient getting this from neurology for " seizure control   -Continue clonidine 0.3 mg p.o. nightly for insomnia, behaviors, and anxiety  -Continue Gabapentin 800 mg 3 times daily for mood and pain  -Approximate appointment time 11:18 AM to 11:40 AM via telephone visit due to patient being a vulnerable population for COVID-19      Visit Diagnoses:    ICD-10-CM ICD-9-CM   1. Autism spectrum disorder requiring very substantial support (level 3)  F84.0 299.00   2. Aggression  R46.89 V40.39   3. Other specified anxiety disorders  F41.8 300.09   4. Intellectual disability, profound  F79 [id number]       TREATMENT PLAN/GOALS: Continue supportive psychotherapy efforts and medications as indicated. Treatment and medication options discussed during today's visit. Patient acknowledged and verbally consented to continue with current treatment plan and was educated on the importance of compliance with treatment and follow-up appointments.    MEDICATION ISSUES:    Discussed medication options and treatment plan of prescribed medication as well as the risks, benefits, and side effects including potential falls, possible impaired driving and metabolic adversities among others. Patient is agreeable to call the office with any worsening of symptoms or onset of side effects. Patient is agreeable to call 911 or go to the nearest ER should he/she begin having SI/HI.     MEDS ORDERED DURING VISIT:  New Medications Ordered This Visit   Medications   • risperiDONE (RisperDAL) 2 MG tablet     Sig: Take 1 tablet by mouth Every Night.     Dispense:  90 tablet     Refill:  0       Return in about 2 months (around 5/3/2023).             This document has been electronically signed by Simon Kapoor MD  March 10, 2023 14:46 EST

## 2023-03-29 ENCOUNTER — OFFICE VISIT (OUTPATIENT)
Dept: GASTROENTEROLOGY | Facility: CLINIC | Age: 23
End: 2023-03-29
Payer: MEDICARE

## 2023-03-29 VITALS — SYSTOLIC BLOOD PRESSURE: 120 MMHG | WEIGHT: 293 LBS | DIASTOLIC BLOOD PRESSURE: 80 MMHG | BODY MASS INDEX: 46.2 KG/M2

## 2023-03-29 DIAGNOSIS — R11.0 NAUSEA: Chronic | ICD-10-CM

## 2023-03-29 DIAGNOSIS — E66.01 CLASS 3 SEVERE OBESITY DUE TO EXCESS CALORIES WITH SERIOUS COMORBIDITY AND BODY MASS INDEX (BMI) OF 45.0 TO 49.9 IN ADULT: Chronic | ICD-10-CM

## 2023-03-29 DIAGNOSIS — R10.9 ABDOMINAL CRAMPING: Chronic | ICD-10-CM

## 2023-03-29 DIAGNOSIS — K21.9 GASTROESOPHAGEAL REFLUX DISEASE WITHOUT ESOPHAGITIS: Primary | Chronic | ICD-10-CM

## 2023-03-29 DIAGNOSIS — F45.8 FUNCTIONAL DYSPHAGIA: Chronic | ICD-10-CM

## 2023-03-29 DIAGNOSIS — R14.2 BELCHING: Chronic | ICD-10-CM

## 2023-03-29 DIAGNOSIS — K59.04 CHRONIC IDIOPATHIC CONSTIPATION: Chronic | ICD-10-CM

## 2023-03-29 PROBLEM — E66.813 CLASS 3 SEVERE OBESITY DUE TO EXCESS CALORIES WITH SERIOUS COMORBIDITY AND BODY MASS INDEX (BMI) OF 45.0 TO 49.9 IN ADULT: Status: ACTIVE | Noted: 2023-03-29

## 2023-03-29 PROCEDURE — 99214 OFFICE O/P EST MOD 30 MIN: CPT | Performed by: NURSE PRACTITIONER

## 2023-03-29 PROCEDURE — 1159F MED LIST DOCD IN RCRD: CPT | Performed by: NURSE PRACTITIONER

## 2023-03-29 PROCEDURE — 1160F RVW MEDS BY RX/DR IN RCRD: CPT | Performed by: NURSE PRACTITIONER

## 2023-03-29 NOTE — PROGRESS NOTES
Follow Up Note     Date: 2023   Patient Name: Eboni Hernandez  MRN: [id number]  : 2000     Primary Care Provider: Yvon Feliciano MD     Chief Complaint   Patient presents with   • Follow-up     After EGD     History of present illness:   3/29/2023  Eboni Hernandez is a 22 y.o. female who is here today for follow up after EGD. She did not have any problems after her EGD per aunt, the patient's caregiver. They are working with her on losing weight, she has lost about 7 pounds since her last visit. Constipation is improving, she has some occasional diarrhea. She is taking Miralax if needed for constipation with reasonable control. There is on history of GI bleeding. Overall, her aunt feels she is doing ok.    After her last visit to this office, they stopped giving her the Metoclopramide. She has also stopped having the monthly x-rays. She had labs recently at PCP office that were ok per her aunt.    Interval History:  2023  Eboni Hernandez is a 22 y.o. female who is here today for follow up for her belching, abdominal pain, constipation.  Aunt states that she gained over 30 pounds recently.  She still has intermittent belching.  No vomiting.  Has been having daily bowel movement with MiraLAX.     2022  Eboni Hernandez is a 21 y.o. female who is here today to establish care with Gastroenterology for evaluation of acid reflux and heartburn.  Patient has a history of autism and significant intellectual disability, non verbal and seizure disorder.   Her care taker, Aunt says that she has been choking with food and excessive burping since about 2021. She feels that food is not going down. She was started on prilosec 20 mg po daily and was increased to BID since couple of months that is not improving her symptoms. She has nausea and retching.  It is unclear whether she has any abdominal pain as patient is nonverbal.     Her seizure under control as per care taker. She has  chronic constipation and takes miralax as needed. She takes percocet as needed.   There is no history of anemia. No prior history of EGD or colonoscopy. No family history of colon cancer. History of stomach cancer remote family member. No history of any abdominal surgery. Denies alcohol abuse or cigarette smoking.      Patient is also on chronic opioids Percocet. She has history of kidney stones.   CT abdomen pelvis done in May 2021 was unremarkable.  CBC CMP was unremarkable November 2021    Subjective      Past Medical History:   Diagnosis Date   • Anxiety    • Autism    • Chronic pelvic pain in female    • Communication deficit     non-verbal   • Constipation    • COVID-19 vaccine series completed     moderna   • Developmental delay    • Difficulty swallowing solids     pills/medications   • Encopresis    • Enuresis    • GERD (gastroesophageal reflux disease)    • Global developmental delay    • Kidney stones    • Multiple developmental ovarian cysts    • Nexplanon in place     left arm   • Scoliosis    • Seizures (HCC)     Last seizure was around the 1st of Jan 2023   • Urinary incontinence      Past Surgical History:   Procedure Laterality Date   • DIAGNOSTIC LAPAROSCOPY N/A 09/11/2018    Procedure: DIAGNOSTIC LAPAROSCOPY drainage of right ovarian cyst;  Surgeon: Amol Ospina MD;  Location: [facility] OR;  Service: Obstetrics/Gynecology   • ENDOSCOPY N/A 02/07/2022    Procedure: ESOPHAGOGASTRODUODENOSCOPY WITH biopsy and polypectomy;  Surgeon: Salvador Becerra MD;  Location: [facility] ENDOSCOPY;  Service: Gastroenterology;  Laterality: N/A;   • ENDOSCOPY N/A 1/20/2023    Procedure: ESOPHAGOGASTRODUODENOSCOPY with biopsy;  Surgeon: Salvador Becerra MD;  Location: [facility] ENDOSCOPY;  Service: Gastroenterology;  Laterality: N/A;   • OTHER SURGICAL HISTORY      IUD PLACEMENT IN OR   • OTHER SURGICAL HISTORY      nexplanon   • URETEROSCOPY LASER LITHOTRIPSY WITH STENT INSERTION  2018   • URETEROSCOPY  STENT INSERTION  2020     Family History   Problem Relation Age of Onset   • Drug abuse Mother    • Anxiety disorder Mother    • Depression Mother    • Drug abuse Father    • Colon cancer Maternal Grandfather    • ADD / ADHD Neg Hx    • Alcohol abuse Neg Hx    • Bipolar disorder Neg Hx    • Dementia Neg Hx    • OCD Neg Hx    • Paranoid behavior Neg Hx    • Schizophrenia Neg Hx    • Seizures Neg Hx    • Self-Injurious Behavior  Neg Hx    • Suicide Attempts Neg Hx      Social History     Socioeconomic History   • Marital status: Single   Tobacco Use   • Smoking status: Never   • Smokeless tobacco: Never   Vaping Use   • Vaping Use: Never used   Substance and Sexual Activity   • Alcohol use: No   • Drug use: No   • Sexual activity: Defer       Current Outpatient Medications:   •  Cenobamate (Xcopri) 200 MG tablet, Take 1 tablet by mouth every night at bedtime., Disp: 30 tablet, Rfl: 5  •  cloNIDine (Catapres) 0.3 MG tablet, Take 1 tablet by mouth Every Night., Disp: 30 tablet, Rfl: 5  •  diazePAM (Valium) 10 MG tablet, TAKE 1 TABLET TWICE DAILY AS NEEDED., Disp: 60 tablet, Rfl: 1  •  diazePAM (Valium) 10 MG tablet, Take 1 tablet by mouth 2 (Two) Times a Day As Needed., Disp: 60 tablet, Rfl: 1  •  diazePAM (Valium) 10 MG tablet, Take 1 tablet by mouth 2 (Two) Times a Day As Needed., Disp: 60 tablet, Rfl: 1  •  Etonogestrel (NEXPLANON SC), Inject  under the skin into the appropriate area as directed., Disp: , Rfl:   •  gabapentin (NEURONTIN) 600 MG tablet, Take 1 tablet by mouth 3 (Three) Times a Day. (Patient taking differently: Take 1 tablet by mouth 3 (Three) Times a Day As Needed.), Disp: 90 tablet, Rfl: 5  •  lamoTRIgine (LaMICtal) 50 MG tablet dispersible disintegrating tablet, Place 3 tablets on the tongue 2 (Two) Times a Day., Disp: 180 tablet, Rfl: 5  •  Levonorgestrel (MIRENA) 20 MCG/DAY intrauterine device IUD, 1 each by Intrauterine route., Disp: , Rfl:   •  metoclopramide (REGLAN) 5 MG tablet, Take 1 tablet  by mouth 4 (Four) Times a Day before meals, Disp: 120 tablet, Rfl: 6  •  Midazolam (Nayzilam) 5 MG/0.1ML solution, Instill 1 spray in nostril if needed for seizure, may repeat in opposite nostril in 10 minutes if needed, Disp: 4 each, Rfl: 5  •  omeprazole (priLOSEC) 20 MG capsule, Take 1 capsule by mouth Daily., Disp: 90 capsule, Rfl: 3  •  ondansetron (Zofran) 4 MG tablet, Take 1 tablet by mouth Every 12 (Twelve) Hours As Needed for Nausea or Vomiting., Disp: 30 tablet, Rfl: 1  •  oxyCODONE-acetaminophen (PERCOCET) 7.5-325 MG per tablet, TAKE 1 TABLET EVERY 4 HOURS AS NEEDED FOR PAIN., Disp: 30 tablet, Rfl: 0  •  oxyCODONE-acetaminophen (PERCOCET) 7.5-325 MG per tablet, Take 1 tablet by mouth Every 4 (Four) Hours As Needed For Pain, Disp: 30 tablet, Rfl: 0  •  polyethylene glycol (MIRALAX) 17 g packet, Take 17 g by mouth Daily., Disp: , Rfl:   •  risperiDONE (RisperDAL) 2 MG tablet, Take 1 tablet by mouth Every Night., Disp: 90 tablet, Rfl: 0     No Known Allergies     The following portions of the patient's history were reviewed and updated as appropriate: allergies, current medications, past family history, past medical history, past social history, past surgical history and problem list.  Objective     Physical Exam  Vitals and nursing note reviewed.   Constitutional:       General: She is not in acute distress.     Appearance: Normal appearance. She is well-developed.   HENT:      Head: Normocephalic and atraumatic.      Mouth/Throat:      Mouth: Mucous membranes are not pale, not dry and not cyanotic.   Eyes:      General: Lids are normal.   Neck:      Trachea: Trachea normal.   Cardiovascular:      Rate and Rhythm: Normal rate.   Pulmonary:      Effort: Pulmonary effort is normal. No respiratory distress.      Breath sounds: Normal breath sounds.   Skin:     General: Skin is warm and dry.   Neurological:      Mental Status: She is alert. Mental status is at baseline.   Psychiatric:         Speech: She is  noncommunicative.       Vitals:    23 1459   BP: 120/80   Pulse: Comment: unable to obtain   SpO2: Comment: unable to obtain   Weight: 134 kg (295 lb)     Body mass index is 46.2 kg/m².     Results Review:   I reviewed the patient's new clinical results.    Admission on 2023, Discharged on 2023   Component Date Value Ref Range Status   • HCG Qualitative 2023 Negative  Negative Final   • Reference Lab Report 2023    Final                    Value:[facility]  [address] [place] [address]  Phone: [phone] or [phone]  Fax: [phone]  Mega Marcos M.D., Medical Director    PATIENT NAME                                     LABORATORY NO.  SELINA SANDOVAL                              [id number]  [id number]                                 AGE                    SEX   SSN              CLIENT REF #  [place] [id number]        2000           [id number]      [id number]    [address]                        REQUESTING M.D.           ATTENDING M.D.         COPY TO.   [address]                                [facility] [place] [address]                         [facility]  DATE COLLECTED            DATE RECEIVED          DATE REPORTED  2023    DIAGNOSIS:  ANTRUM AND BODY, BIOPSY:  Gastric antral and fundic type mucosa with mild chronic inactive                           gastritis  Negative for intestinal metaplasia or dysplasia  No Helicobacter pylori-like organisms seen    HOA    REVIEWED, DIAGNOSED AND ELECTRONICALLY  SIGNED BY:    Ben Nickerson M.D., F.C.A.P.  CPT CODES:  53533        XR Abdomen KUB     Result Date: 10/26/2022  Nonobstructive bowel gas pattern. No radiodense renal stones identified on this exam.        CT Abdomen Pelvis Stone Protocol     Result Date: 10/26/2022  No definite acute findings  in the abdomen or pelvis to account for the patient's symptoms.  No nephrolithiasis. No urolithiasis. No hydronephrosis.  There is a large masslike debris with mottled appearance in the gastric lumen, nonspecific, can be seen in gastric phytobezoar. Correlate clinically.         XR Abdomen KUB     Result Date: 12/21/2022  No acute abdominal process identified.       EGD 2/7/2022 per Dr. Becerra  - Normal oropharynx.  - Z-line regular, 37 cm from the incisors.  - No gross lesions in esophagus.  - No endoscopic esophageal abnormality to explain patient's dysphagia. Biopsied.  - Two gastric polyps. Resected and retrieved.  - Normal cardia, gastric fundus, gastric body, incisura, antrum and prepyloric region of the stomach except two small FGP. Biopsied.  - Normal duodenal bulb, first portion of the duodenum, second portion of the duodenum and third portion of the duodenum. Biopsied.  - No endoscopic findings that could explain her symptoms  - Patient most likely has Functional symptoms.    - Path: WNL    EGD dated 1/20/2023 per Dr. Becerra  - Normal oropharynx.  - Z-line regular, 35 cm from the incisors.  - No gross lesions in the entire esophagus.  - Normal cardia, gastric fundus, gastric body, antrum and prepyloric region of the stomach. Biopsied.  - Normal duodenal bulb, first portion of the duodenum, second portion of the duodenum and third portion of the duodenum.  - Bilious gastric fluid.  - Abnormal CT finding likely secondary to food in the stomach (patient had CT abdomen few minutes after her morning breakfast).  ANTRUM AND BODY, BIOPSY:   Gastric antral and fundic type mucosa with mild chronic inactive gastritis   Negative for intestinal metaplasia or dysplasia   No Helicobacter pylori-like organisms seen    Assessment / Plan      1. Gastroesophageal reflux disease without esophagitis  2. Functional dysphagia  3. Belching  4. Nausea  5. Abdominal cramping  6. Chronic idiopathic constipation  7. Class 3  severe obesity due to excess calories with serious comorbidity and body mass index (BMI) of 45.0 to 49.9 in adult (HCC)  She has been taking omeprazole 20 mg daily and according to her aunt, her caregiver, she has been doing reasonably well.  There is no history of GI bleeding.  Constipation is doing good with MiraLAX as needed, she occasionally has some loose stools.  She is no longer taking metoclopramide, the aunt stopped it after her last visit, and she has continued to do okay.  She has lost about 7 pounds since her last visit, the aunt is working with her on trying to lose weight.  She had labs recently at PCP office and told they were okay, unfortunately we do not have those results.  Previous CTAP with large masslike debris in the gastric lumen noted, patient had eaten immediately prior to having her CT scan.  Follow-up EGD unremarkable, CT finding likely secondary to food in the stomach.  Biopsies negative for H. pylori.  Advised to eat a softer diet with 4-5 very small meals throughout the day.  Low fiber, low-fat diet with liberal water intake.  May take MiraLAX 17 g daily as needed for constipation.  Hold if having diarrhea.  Advise low-fat diet, exercise and weight reduction.  I do not recommend Reglan for this patient unless absolutely needed and I do not see any absolute indication.  This medicine was not been prescribed by this office.  Given her obesity and functional issues, we may not be able to completely fix her upper GI symptoms with medications.  We will call to obtain recent labs from PCP office.  Continue low-dose PPI daily.  May take Zofran as needed for nausea. .  Consider referral to nonsurgical weight loss program.    Patient Instructions   1. Antireflux measures: Avoid fried, fatty foods, alcohol, chocolate, coffee, tea,  soft drinks, peppermint and spearmint, spicy foods, tomatoes and tomato based foods, onions, peppers, and smoking.   2. Other antireflux measures include weight  reduction if overweight, avoiding tight clothing around the abdomen, elevating the head of the bed 6 inches with blocks under the head board, and don't drink or eat before going to bed and avoid lying down immediately after meals.  3. Omeprazole 20 mg 1 by mouth in the am 30 minutes before breakfast.  4. Zofran 4 mg 1 po every 8 hours as needed for nausea.   5. The patient should eat 4-5 very small meals throughout the day. Avoid large meals.  6. It is recommended to eat a softer diet. Meats are best consumed ground. Fruits and vegetables are best consumed cooked or steamed and then mashed.    7. Miralax 17 grams daily as needed for constipation. Hold for diarrhea.  8. Low fiber, low fat diet with liberal water intake. May use soluble fiber such as Metamucil.   9. Advised to exercise 30 minutes 4-5 days per week.   10. Advised to lose 25-30 pounds in the next 6-12 months.   11. Follow up: 1 year or sooner if needed    Eula Ness, APRN  3/29/2023    Please note that portions of this note were completed with a voice recognition program.

## 2023-03-29 NOTE — PATIENT INSTRUCTIONS
Antireflux measures: Avoid fried, fatty foods, alcohol, chocolate, coffee, tea,  soft drinks, peppermint and spearmint, spicy foods, tomatoes and tomato based foods, onions, peppers, and smoking.   Other antireflux measures include weight reduction if overweight, avoiding tight clothing around the abdomen, elevating the head of the bed 6 inches with blocks under the head board, and don't drink or eat before going to bed and avoid lying down immediately after meals.  Omeprazole 20 mg 1 by mouth in the am 30 minutes before breakfast.  Zofran 4 mg 1 po every 8 hours as needed for nausea.   The patient should eat 4-5 very small meals throughout the day. Avoid large meals.  It is recommended to eat a softer diet. Meats are best consumed ground. Fruits and vegetables are best consumed cooked or steamed and then mashed.    Miralax 17 grams daily as needed for constipation. Hold for diarrhea.  Low fiber, low fat diet with liberal water intake. May use soluble fiber such as Metamucil.   Advised to exercise 30 minutes 4-5 days per week.   Advised to lose 25-30 pounds in the next 6-12 months.   Follow up: 1 year or sooner if needed

## 2023-04-07 ENCOUNTER — OFFICE VISIT (OUTPATIENT)
Dept: OBSTETRICS AND GYNECOLOGY | Facility: CLINIC | Age: 23
End: 2023-04-07
Payer: MEDICARE

## 2023-04-07 VITALS
WEIGHT: 292 LBS | DIASTOLIC BLOOD PRESSURE: 74 MMHG | HEIGHT: 67 IN | SYSTOLIC BLOOD PRESSURE: 118 MMHG | BODY MASS INDEX: 45.83 KG/M2

## 2023-04-07 DIAGNOSIS — Z97.5 NEXPLANON IN PLACE: Primary | ICD-10-CM

## 2023-04-07 DIAGNOSIS — Z30.017 ENCOUNTER FOR INITIAL PRESCRIPTION OF NEXPLANON: ICD-10-CM

## 2023-04-07 DIAGNOSIS — F79 INTELLECTUAL DISABILITY: Chronic | ICD-10-CM

## 2023-04-07 DIAGNOSIS — F80.9 PROBLEMS WITH COMMUNICATION: ICD-10-CM

## 2023-04-13 NOTE — PROGRESS NOTES
Subjective   Chief Complaint   Patient presents with   • Contraception     Nexplanon due for removal, would like to order new one.     Eboni Hernandez is a 22 y.o. year old  presenting to be seen for Nexplanon follow-up.    Her mother assists with the history today. The patient is nonverbal with intellectual disability.  She reports that the patient has done very well with the Nexplanon and would like a new device placed under sedation.    She denies fevers, chills, significant weight changes, hair/skin/nail changes, dysuria, urinary frequency, palpitations, myalgia, dyspnea.     History  Past Medical History:   Diagnosis Date   • Anxiety    • Autism    • Chronic pelvic pain in female    • Communication deficit     non-verbal   • Constipation    • COVID-19 vaccine series completed     moderna   • Developmental delay    • Difficulty swallowing solids     pills/medications   • Encopresis    • Enuresis    • GERD (gastroesophageal reflux disease)    • Global developmental delay    • Kidney stones    • Multiple developmental ovarian cysts    • Nexplanon in place     left arm   • Scoliosis    • Seizures     Last seizure was around the    • Urinary incontinence    ,   Past Surgical History:   Procedure Laterality Date   • DIAGNOSTIC LAPAROSCOPY N/A 2018    Procedure: DIAGNOSTIC LAPAROSCOPY drainage of right ovarian cyst;  Surgeon: Amol Ospina MD;  Location: [facility] OR;  Service: Obstetrics/Gynecology   • ENDOSCOPY N/A 2022    Procedure: ESOPHAGOGASTRODUODENOSCOPY WITH biopsy and polypectomy;  Surgeon: Salvador Becerra MD;  Location: [facility] ENDOSCOPY;  Service: Gastroenterology;  Laterality: N/A;   • ENDOSCOPY N/A 2023    Procedure: ESOPHAGOGASTRODUODENOSCOPY with biopsy;  Surgeon: Salvador Becerra MD;  Location: [facility] ENDOSCOPY;  Service: Gastroenterology;  Laterality: N/A;   • OTHER SURGICAL HISTORY      IUD PLACEMENT IN OR   • OTHER SURGICAL HISTORY       "nexplanon   • URETEROSCOPY LASER LITHOTRIPSY WITH STENT INSERTION  2018   • URETEROSCOPY STENT INSERTION  2020   ,   Family History   Problem Relation Age of Onset   • Drug abuse Mother    • Anxiety disorder Mother    • Depression Mother    • Drug abuse Father    • Colon cancer Maternal Grandfather    • ADD / ADHD Neg Hx    • Alcohol abuse Neg Hx    • Bipolar disorder Neg Hx    • Dementia Neg Hx    • OCD Neg Hx    • Paranoid behavior Neg Hx    • Schizophrenia Neg Hx    • Seizures Neg Hx    • Self-Injurious Behavior  Neg Hx    • Suicide Attempts Neg Hx    ,   Social History     Tobacco Use   • Smoking status: Never   • Smokeless tobacco: Never   Vaping Use   • Vaping Use: Never used   Substance Use Topics   • Alcohol use: No   • Drug use: No   , (Not in a hospital admission)   and Allergies:  Patient has no known allergies.    Social History    Tobacco Use      Smoking status: Never      Smokeless tobacco: Never      Review of Systems  Pertinent items are noted in HPI, all other systems reviewed and negative       Objective   /74   Ht 170.2 cm (67\")   Wt 132 kg (292 lb)   BMI 45.73 kg/m²     Physical Exam:  None  Pelvis:  Deferred       Assessment   1.  Nexplanon in place, desires removal and reinsertion of new device  2.  Intellectual disability with nonverbal status     Plan      We discussed this patient's situation in detail today.  Plan for Nexplanon removal and reinsertion in the OR under sedation.  A new device was ordered today.  A Pap smear can be performed at that time as well.  All questions answered.    Amol Ospina MD  Obstetrics and Gynecology  [facility]    "

## 2023-04-19 ENCOUNTER — PREP FOR SURGERY (OUTPATIENT)
Dept: OTHER | Facility: HOSPITAL | Age: 23
End: 2023-04-19
Payer: MEDICARE

## 2023-04-19 DIAGNOSIS — F79 MENTAL HANDICAP: ICD-10-CM

## 2023-04-19 DIAGNOSIS — Z12.4 SCREENING FOR MALIGNANT NEOPLASM OF CERVIX: Primary | ICD-10-CM

## 2023-04-19 DIAGNOSIS — Z30.46 ENCOUNTER FOR REMOVAL AND REINSERTION OF NEXPLANON: ICD-10-CM

## 2023-04-19 RX ORDER — SODIUM CHLORIDE 0.9 % (FLUSH) 0.9 %
10 SYRINGE (ML) INJECTION AS NEEDED
OUTPATIENT
Start: 2023-04-19

## 2023-04-19 RX ORDER — SODIUM CHLORIDE 0.9 % (FLUSH) 0.9 %
3 SYRINGE (ML) INJECTION EVERY 12 HOURS SCHEDULED
OUTPATIENT
Start: 2023-04-19

## 2023-04-19 RX ORDER — SODIUM CHLORIDE 9 MG/ML
40 INJECTION, SOLUTION INTRAVENOUS AS NEEDED
OUTPATIENT
Start: 2023-04-19

## 2023-05-01 ENCOUNTER — HOSPITAL ENCOUNTER (OUTPATIENT)
Dept: GENERAL RADIOLOGY | Facility: HOSPITAL | Age: 23
Discharge: HOME | End: 2023-05-01
Payer: MEDICARE

## 2023-05-01 ENCOUNTER — TRANSCRIBE ORDERS (OUTPATIENT)
Dept: LAB | Facility: HOSPITAL | Age: 23
End: 2023-05-01
Payer: MEDICARE

## 2023-05-01 ENCOUNTER — LAB (OUTPATIENT)
Dept: LAB | Facility: HOSPITAL | Age: 23
End: 2023-05-01
Payer: MEDICARE

## 2023-05-01 DIAGNOSIS — N31.1 REFLEX NEUROGENIC BLADDER: ICD-10-CM

## 2023-05-01 DIAGNOSIS — N30.20 BLADDER INFECTION, CHRONIC: ICD-10-CM

## 2023-05-01 DIAGNOSIS — N30.20 BLADDER INFECTION, CHRONIC: Primary | ICD-10-CM

## 2023-05-01 LAB
ALBUMIN SERPL-MCNC: 3.8 G/DL (ref 3.5–5.2)
ALBUMIN/GLOB SERPL: 1.1 G/DL
ALP SERPL-CCNC: 113 U/L (ref 39–117)
ALT SERPL W P-5'-P-CCNC: 12 U/L (ref 1–33)
ANION GAP SERPL CALCULATED.3IONS-SCNC: 12 MMOL/L (ref 5–15)
AST SERPL-CCNC: 18 U/L (ref 1–32)
BILIRUB SERPL-MCNC: <0.2 MG/DL (ref 0–1.2)
BUN SERPL-MCNC: 11 MG/DL (ref 6–20)
BUN/CREAT SERPL: 14.1 (ref 7–25)
CALCIUM SPEC-SCNC: 9.2 MG/DL (ref 8.6–10.5)
CHLORIDE SERPL-SCNC: 108 MMOL/L (ref 98–107)
CO2 SERPL-SCNC: 22 MMOL/L (ref 22–29)
CREAT SERPL-MCNC: 0.78 MG/DL (ref 0.57–1)
EGFRCR SERPLBLD CKD-EPI 2021: 109.6 ML/MIN/1.73
GLOBULIN UR ELPH-MCNC: 3.4 GM/DL
GLUCOSE SERPL-MCNC: 145 MG/DL (ref 65–99)
POTASSIUM SERPL-SCNC: 4.4 MMOL/L (ref 3.5–5.2)
PROT SERPL-MCNC: 7.2 G/DL (ref 6–8.5)
SODIUM SERPL-SCNC: 142 MMOL/L (ref 136–145)

## 2023-05-01 PROCEDURE — 80053 COMPREHEN METABOLIC PANEL: CPT

## 2023-05-01 PROCEDURE — 36415 COLL VENOUS BLD VENIPUNCTURE: CPT

## 2023-05-01 PROCEDURE — 74018 RADEX ABDOMEN 1 VIEW: CPT

## 2023-05-05 ENCOUNTER — ANESTHESIA EVENT (OUTPATIENT)
Dept: PERIOP | Facility: HOSPITAL | Age: 23
End: 2023-05-05
Payer: MEDICARE

## 2023-05-08 NOTE — PRE-PROCEDURE INSTRUCTIONS
PAT phone history completed with pt's guardian for upcoming procedure on 5/9/23 with Dr. Ospina.    PAT PASS GIVEN/REVIEWED WITH PT'S GUARDIAN.  VERBALIZED UNDERSTANDING OF THE FOLLOWING:  DO NOT EAT, DRINK, SMOKE, USE SMOKELESS TOBACCO OR CHEW GUM AFTER MIDNIGHT THE NIGHT BEFORE SURGERY.  THIS ALSO INCLUDES HARD CANDIES AND MINTS.    DO NOT SHAVE THE AREA TO BE OPERATED ON AT LEAST 48 HOURS PRIOR TO THE PROCEDURE.  DO NOT WEAR MAKE UP OR NAIL POLISH.  DO NOT LEAVE IN ANY PIERCING OR WEAR JEWELRY THE DAY OF SURGERY.      DO NOT USE ADHESIVES IF YOU WEAR DENTURES.    DO NOT WEAR EYE CONTACTS; BRING IN YOUR GLASSES.    ONLY TAKE MEDICATION THE MORNING OF YOUR PROCEDURE IF INSTRUCTED BY YOUR SURGEON WITH ENOUGH WATER TO SWALLOW THE MEDICATION.  IF YOUR SURGEON DID NOT SPECIFY WHICH MEDICATIONS TO TAKE, YOU WILL NEED TO CALL THEIR OFFICE FOR FURTHER INSTRUCTIONS AND DO AS THEY INSTRUCT.    LEAVE ANYTHING YOU CONSIDER VALUABLE AT HOME.    YOU WILL NEED TO ARRANGE FOR SOMEONE TO DRIVE YOU HOME AFTER SURGERY.  IT IS RECOMMENDED THAT YOU DO NOT DRIVE, WORK, DRINK ALCOHOL OR MAKE MAJOR DECISIONS FOR AT LEAST 24 HOURS AFTER YOUR PROCEDURE IS COMPLETE.      THE DAY OF YOUR PROCEDURE, BRING IN THE FOLLOWING IF APPLICABLE:   PICTURE ID AND INSURANCE/MEDICARE OR MEDICAID CARDS/ANY CO-PAY THAT MAY BE DUE   COPY OF ADVANCED DIRECTIVE/LIVING WILL/POWER OR    CPAP/BIPAP/INHALERS   SKIN PREP SHEET   YOUR PREADMISSION TESTING PASS (IF NOT A PHONE HISTORY)

## 2023-05-08 NOTE — PAT
Called anesthesia phone and spoke with Artem Moran CRNA.  Informed that pt is having a procedure tomorrow with Dr. Ospina.  Pt's guardian asked about taking seizure medication prior to surgery.  States that pt is unable to take her meds with just a sip of water.  States that she has to have a bite of food in order to take them.  Pt's arrival time tomorrow is 12pm.  Artem stated that the pt can take her seizure medication at 0730 am with the smallest bite possible.  Informed pt's guardian.  Verbalized understanding.

## 2023-05-09 ENCOUNTER — HOSPITAL ENCOUNTER (OUTPATIENT)
Facility: HOSPITAL | Age: 23
Setting detail: HOSPITAL OUTPATIENT SURGERY
Discharge: HOME | End: 2023-05-09
Attending: OBSTETRICS & GYNECOLOGY | Admitting: OBSTETRICS & GYNECOLOGY
Payer: MEDICARE

## 2023-05-09 ENCOUNTER — ANESTHESIA (OUTPATIENT)
Dept: PERIOP | Facility: HOSPITAL | Age: 23
End: 2023-05-09
Payer: MEDICARE

## 2023-05-09 VITALS
DIASTOLIC BLOOD PRESSURE: 87 MMHG | SYSTOLIC BLOOD PRESSURE: 138 MMHG | WEIGHT: 290 LBS | TEMPERATURE: 97.1 F | HEIGHT: 67 IN | HEART RATE: 76 BPM | OXYGEN SATURATION: 99 % | RESPIRATION RATE: 14 BRPM | BODY MASS INDEX: 45.52 KG/M2

## 2023-05-09 DIAGNOSIS — Z12.4 SCREENING FOR MALIGNANT NEOPLASM OF CERVIX: ICD-10-CM

## 2023-05-09 DIAGNOSIS — Z30.46 ENCOUNTER FOR REMOVAL AND REINSERTION OF NEXPLANON: ICD-10-CM

## 2023-05-09 DIAGNOSIS — F79 MENTAL HANDICAP: ICD-10-CM

## 2023-05-09 PROBLEM — Z97.5 NEXPLANON IN PLACE: Status: ACTIVE | Noted: 2023-05-09

## 2023-05-09 LAB
DEPRECATED RDW RBC AUTO: 41.8 FL (ref 37–54)
ERYTHROCYTE [DISTWIDTH] IN BLOOD BY AUTOMATED COUNT: 13.3 % (ref 12.3–15.4)
HCG SERPL QL: NEGATIVE
HCT VFR BLD AUTO: 40.5 % (ref 34–46.6)
HGB BLD-MCNC: 13 G/DL (ref 12–15.9)
MCH RBC QN AUTO: 27.5 PG (ref 26.6–33)
MCHC RBC AUTO-ENTMCNC: 32.1 G/DL (ref 31.5–35.7)
MCV RBC AUTO: 85.6 FL (ref 79–97)
PLATELET # BLD AUTO: 264 10*3/MM3 (ref 140–450)
PMV BLD AUTO: 11.3 FL (ref 6–12)
RBC # BLD AUTO: 4.73 10*6/MM3 (ref 3.77–5.28)
WBC NRBC COR # BLD: 7.06 10*3/MM3 (ref 3.4–10.8)

## 2023-05-09 PROCEDURE — 84703 CHORIONIC GONADOTROPIN ASSAY: CPT | Performed by: NURSE ANESTHETIST, CERTIFIED REGISTERED

## 2023-05-09 PROCEDURE — 25010000002 ONDANSETRON PER 1 MG: Performed by: NURSE ANESTHETIST, CERTIFIED REGISTERED

## 2023-05-09 PROCEDURE — 11983 REMOVE/INSERT DRUG IMPLANT: CPT | Performed by: OBSTETRICS & GYNECOLOGY

## 2023-05-09 PROCEDURE — 25010000002 MIDAZOLAM PER 1MG: Performed by: NURSE ANESTHETIST, CERTIFIED REGISTERED

## 2023-05-09 PROCEDURE — 85027 COMPLETE CBC AUTOMATED: CPT | Performed by: NURSE ANESTHETIST, CERTIFIED REGISTERED

## 2023-05-09 PROCEDURE — C1889 IMPLANT/INSERT DEVICE, NOC: HCPCS | Performed by: OBSTETRICS & GYNECOLOGY

## 2023-05-09 PROCEDURE — S0260 H&P FOR SURGERY: HCPCS | Performed by: OBSTETRICS & GYNECOLOGY

## 2023-05-09 PROCEDURE — 25010000002 PROPOFOL 10 MG/ML EMULSION: Performed by: NURSE ANESTHETIST, CERTIFIED REGISTERED

## 2023-05-09 PROCEDURE — 57410 PELVIC EXAMINATION: CPT | Performed by: OBSTETRICS & GYNECOLOGY

## 2023-05-09 DEVICE — CONTRACEP ESTROGESTREL NEXPLANON RO: Type: IMPLANTABLE DEVICE | Site: ARM | Status: FUNCTIONAL

## 2023-05-09 RX ORDER — LIDOCAINE HYDROCHLORIDE AND EPINEPHRINE 10; 10 MG/ML; UG/ML
INJECTION, SOLUTION INFILTRATION; PERINEURAL AS NEEDED
Status: DISCONTINUED | OUTPATIENT
Start: 2023-05-09 | End: 2023-05-09 | Stop reason: HOSPADM

## 2023-05-09 RX ORDER — SODIUM CHLORIDE 0.9 % (FLUSH) 0.9 %
3 SYRINGE (ML) INJECTION EVERY 12 HOURS SCHEDULED
Status: DISCONTINUED | OUTPATIENT
Start: 2023-05-09 | End: 2023-05-09 | Stop reason: HOSPADM

## 2023-05-09 RX ORDER — PROPOFOL 10 MG/ML
VIAL (ML) INTRAVENOUS AS NEEDED
Status: DISCONTINUED | OUTPATIENT
Start: 2023-05-09 | End: 2023-05-09 | Stop reason: SURG

## 2023-05-09 RX ORDER — HYDROCODONE BITARTRATE AND ACETAMINOPHEN 5; 325 MG/1; MG/1
1 TABLET ORAL ONCE AS NEEDED
Status: DISCONTINUED | OUTPATIENT
Start: 2023-05-09 | End: 2023-05-09 | Stop reason: HOSPADM

## 2023-05-09 RX ORDER — SODIUM CHLORIDE 0.9 % (FLUSH) 0.9 %
10 SYRINGE (ML) INJECTION AS NEEDED
Status: DISCONTINUED | OUTPATIENT
Start: 2023-05-09 | End: 2023-05-09 | Stop reason: HOSPADM

## 2023-05-09 RX ORDER — ONDANSETRON 2 MG/ML
INJECTION INTRAMUSCULAR; INTRAVENOUS AS NEEDED
Status: DISCONTINUED | OUTPATIENT
Start: 2023-05-09 | End: 2023-05-09 | Stop reason: SURG

## 2023-05-09 RX ORDER — LIDOCAINE HYDROCHLORIDE 20 MG/ML
INJECTION, SOLUTION INTRAVENOUS AS NEEDED
Status: DISCONTINUED | OUTPATIENT
Start: 2023-05-09 | End: 2023-05-09 | Stop reason: SURG

## 2023-05-09 RX ORDER — MIDAZOLAM HYDROCHLORIDE 2 MG/2ML
INJECTION, SOLUTION INTRAMUSCULAR; INTRAVENOUS AS NEEDED
Status: DISCONTINUED | OUTPATIENT
Start: 2023-05-09 | End: 2023-05-09 | Stop reason: SURG

## 2023-05-09 RX ORDER — SODIUM CHLORIDE 9 MG/ML
40 INJECTION, SOLUTION INTRAVENOUS AS NEEDED
Status: DISCONTINUED | OUTPATIENT
Start: 2023-05-09 | End: 2023-05-09 | Stop reason: HOSPADM

## 2023-05-09 RX ORDER — SODIUM CHLORIDE, SODIUM LACTATE, POTASSIUM CHLORIDE, CALCIUM CHLORIDE 600; 310; 30; 20 MG/100ML; MG/100ML; MG/100ML; MG/100ML
1000 INJECTION, SOLUTION INTRAVENOUS CONTINUOUS
Status: DISCONTINUED | OUTPATIENT
Start: 2023-05-09 | End: 2023-05-09 | Stop reason: HOSPADM

## 2023-05-09 RX ORDER — IBUPROFEN 600 MG/1
600 TABLET ORAL EVERY 6 HOURS PRN
Status: DISCONTINUED | OUTPATIENT
Start: 2023-05-09 | End: 2023-05-09 | Stop reason: HOSPADM

## 2023-05-09 RX ADMIN — MIDAZOLAM HYDROCHLORIDE 2 MG: 1 INJECTION, SOLUTION INTRAMUSCULAR; INTRAVENOUS at 13:17

## 2023-05-09 RX ADMIN — PROPOFOL 100 MG: 10 INJECTION, EMULSION INTRAVENOUS at 13:18

## 2023-05-09 RX ADMIN — SODIUM CHLORIDE, POTASSIUM CHLORIDE, SODIUM LACTATE AND CALCIUM CHLORIDE 1000 ML: 600; 310; 30; 20 INJECTION, SOLUTION INTRAVENOUS at 12:53

## 2023-05-09 RX ADMIN — ONDANSETRON 4 MG: 2 INJECTION INTRAMUSCULAR; INTRAVENOUS at 13:21

## 2023-05-09 RX ADMIN — PROPOFOL 150 MCG/KG/MIN: 10 INJECTION, EMULSION INTRAVENOUS at 13:18

## 2023-05-09 RX ADMIN — LIDOCAINE HYDROCHLORIDE 100 MG: 20 INJECTION, SOLUTION INTRAVENOUS at 13:18

## 2023-05-09 NOTE — DISCHARGE INSTRUCTIONS
Pelvic Surgery  Home Care Instructions:  Dr. Amol Ospina      Thank you for choosing [facility]. Please let us know if you have questions or concerns or do not understand the information we give you. Always ask us to explain words or phrases you do not understand.    You have had pelvic surgery. Here are some basic guidelines to help you recover more quickly at home. Your doctor may give you more guidelines. If you have any questions after you go home, please call the numbers listed on the next page.    General Guidelines    1. You may resume normal daily activities.  2. Do not put anything in the vagina (no tampons or douching).    3.   Do not have sex until cleared by your physician.  4.   You may climb steps.  5. You may bathe or shower.  6. The only exercise you should do is walking. This is important for your recovery.  7. Do not drive while taking narcotics.  8. Take the medicines you were taking before surgery. Other medicines you need to take at home are:    Alternate Motrin and Tylenol around the clock. Take each every 8 hours in alternation so that you are getting one of the medications every 4 hours.      Metamucil + Colace daily to keep bowel movements soft        If you have questions about your medicines, let us know. Or, talk to your local pharmacist.    Surgery, lack of activity, pain medicines and iron pills tend to cause constipation. Take something every day to prevent constipation and straining.  Taking something like Metamucil® every day to increase fiber may prevent constipation. Follow the instructions on the container. If you need a gentle laxative, then something like Milk of Magnesia® or Correctol® work fairly well. You should not need to take laxatives often.    10. Call your doctor if you have:     a. Fever of 101 degrees or higher.  b. Heavy vaginal bleeding.  c. Worsening nausea or pain.  d. Other problems or concern.    If you have any questions or concerns about  your usual routines, please talk to the nurse or doctor.       To talk with your doctor at [facility], call:  Obstetrics and Gynecology Outpatient [facility]  Phone: [phone]      We appreciate the opportunity you have given us to participate in your care.     No pushing, pulling, tugging,  heavy lifting, or strenuous activity.  No major decision making, driving, or drinking alcoholic beverages for 24 hours. ( due to the medications you have  received)  Always use good hand hygiene/washing techniques.  NO driving while taking pain medications.    * if you have an incision:  Check your incision area every day for signs of infection.   Check for:  * more redness, swelling, or pain  *more fluid or blood  *warmth  *pus or bad smell To assist you in voiding:  Drink plenty of fluids  Listen to running water while attempting to void.    If you are unable to urinate and you have an uncomfortable urge to void or it has been   6 hours since you were discharged, return to the Emergency Room

## 2023-05-09 NOTE — H&P
GYNECOLOGY HISTORY AND PHYSICAL    CHIEF COMPLAINT: Scheduled surgery    DIAGNOSIS:  Nexplanon in place, desires removal and reinsertion of new device  Screening for malignancy of the cervix  Intellectual disability with nonverbal status    ASSESSMENT/PLAN     22 y.o.  female who presents for scheduled Nexplanon removal and reinsertion, Pap smear.    - Proceed to the OR for scheduled surgery  - Risks for the procedure reviewed  - SCDs for DVT ppx  - Antibiotics: None    HISTORY OF PRESENT ILLNESS     22 y.o.  female who presents for the scheduled procedure listed above.    She has no complaints today and there are no interval changes to the history.    REVIEW OF SYSTEMS: A complete review of systems was performed and was specifically negative for headache, changes in vision, RUQ pain, shortness of breath, chest pain, lower extremity edema and dysuria.     HISTORY:  Obstetrical History:  OB History    Para Term  AB Living   0 0 0 0 0 0   SAB IAB Ectopic Molar Multiple Live Births   0 0 0 0 0 0       Past Medical History:  Past Medical History:   Diagnosis Date   • Anxiety    • Autism    • Chronic pelvic pain in female    • Communication deficit     non-verbal   • Constipation    • COVID-19 vaccine series completed     moderna   • Developmental delay    • Difficulty swallowing solids     pills/medications   • Encopresis    • Enuresis    • GERD (gastroesophageal reflux disease)    • Global developmental delay    • Kidney stones    • Multiple developmental ovarian cysts    • Nexplanon in place     left arm   • Scoliosis    • Seizures     Last seizure was around the    • Urinary incontinence        Past Surgical History:  Past Surgical History:   Procedure Laterality Date   • DIAGNOSTIC LAPAROSCOPY N/A 2018    Procedure: DIAGNOSTIC LAPAROSCOPY drainage of right ovarian cyst;  Surgeon: Amol Ospina MD;  Location: [facility];  Service: Obstetrics/Gynecology   •  ENDOSCOPY N/A 02/07/2022    Procedure: ESOPHAGOGASTRODUODENOSCOPY WITH biopsy and polypectomy;  Surgeon: Salvador Becerra MD;  Location: [facility] END[facility]COPY;  Service: Gastroenterology;  Laterality: N/A;   • ENDOSCOPY N/A 1/20/2023    Procedure: ESOPHAGOGASTRODUODENOSCOPY with biopsy;  Surgeon: Salvador Becerra MD;  Location: [facility] ENDOSCOPY;  Service: Gastroenterology;  Laterality: N/A;   • OTHER SURGICAL HISTORY      IUD PLACEMENT IN OR   • OTHER SURGICAL HISTORY      nexplanon   • URETEROSCOPY LASER LITHOTRIPSY WITH STENT INSERTION  2018   • URETEROSCOPY STENT INSERTION  2020       Social History:  Social History     Tobacco Use   • Smoking status: Never   • Smokeless tobacco: Never   Vaping Use   • Vaping Use: Never used   Substance Use Topics   • Alcohol use: No   • Drug use: No       Family History  Family History   Problem Relation Age of Onset   • Drug abuse Mother    • Anxiety disorder Mother    • Depression Mother    • Drug abuse Father    • Colon cancer Maternal Grandfather    • ADD / ADHD Neg Hx    • Alcohol abuse Neg Hx    • Bipolar disorder Neg Hx    • Dementia Neg Hx    • OCD Neg Hx    • Paranoid behavior Neg Hx    • Schizophrenia Neg Hx    • Seizures Neg Hx    • Self-Injurious Behavior  Neg Hx    • Suicide Attempts Neg Hx         Allergies: No Known Allergies    OBJECTIVE   VITALS:  Temp:  [97.1 °F (36.2 °C)] 97.1 °F (36.2 °C)  BP: (122)/(93) 122/93    PHYSICAL EXAM:  GENERAL: NAD, alert  CHEST: No increased work of breathing, CTAB  CV: RRR, WWP  ABDOMEN: Soft, NTTP  EXTREMITIES:  Warm and well-perfused, nontender, nonedematous  NEURO: AAO x 3, CN II-XII grossly intact    No current facility-administered medications on file prior to encounter.     Current Outpatient Medications on File Prior to Encounter   Medication Sig Dispense Refill   • cloNIDine (Catapres) 0.3 MG tablet Take 1 tablet by mouth Every Night. 30 tablet 5   • lamoTRIgine (LaMICtal) 50 MG tablet dispersible disintegrating  tablet Place 3 tablets on the tongue 2 (Two) Times a Day. 180 tablet 5   • metoclopramide (REGLAN) 5 MG tablet Take 1 tablet by mouth 4 (Four) Times a Day before meals 120 tablet 6   • omeprazole (priLOSEC) 20 MG capsule Take 1 capsule by mouth Daily. 90 capsule 3   • risperiDONE (RisperDAL) 2 MG tablet Take 1 tablet by mouth Every Night. 90 tablet 0   • Xcopri 200 MG tablet Take 1 tablet by mouth every night at bedtime. 30 tablet 5   • diazePAM (Valium) 10 MG tablet TAKE 1 TABLET TWICE DAILY AS NEEDED. 60 tablet 1   • diazePAM (Valium) 10 MG tablet Take 1 tablet by mouth 2 (Two) Times a Day As Needed. 60 tablet 1   • diazePAM (Valium) 10 MG tablet Take 1 tablet by mouth 2 (Two) Times a Day As Needed. 60 tablet 1   • Etonogestrel (NEXPLANON SC) Inject  under the skin into the appropriate area as directed.     • gabapentin (NEURONTIN) 600 MG tablet Take 1 tablet by mouth 3 (Three) Times a Day. (Patient not taking: Reported on 5/8/2023) 90 tablet 5   • Levonorgestrel (MIRENA) 20 MCG/DAY intrauterine device IUD 1 each by Intrauterine route. (Patient not taking: Reported on 5/8/2023)     • Midazolam (Nayzilam) 5 MG/0.1ML solution Instill 1 spray in nostril if needed for seizure, may repeat in opposite nostril in 10 minutes if needed 4 each 5   • ondansetron (Zofran) 4 MG tablet Take 1 tablet by mouth Every 12 (Twelve) Hours As Needed for Nausea or Vomiting. 30 tablet 1   • oxyCODONE-acetaminophen (PERCOCET) 7.5-325 MG per tablet TAKE 1 TABLET EVERY 4 HOURS AS NEEDED FOR PAIN. 30 tablet 0   • polyethylene glycol (MIRALAX) 17 g packet Take 17 g by mouth Daily As Needed.         DIAGNOSTIC STUDIES:        Invalid input(s): LABALB, UA    No results found for this or any previous visit (from the past 24 hour(s)).    Amol Ospina MD  Obstetrics and Gynecology  [facility]

## 2023-05-09 NOTE — ANESTHESIA POSTPROCEDURE EVALUATION
Patient: Eboni Hernandez    Procedure Summary     Date: 05/09/23 Room / Location: [facility] OR  /  [facility]    Anesthesia Start: 1310 Anesthesia Stop: 1344    Procedure: PAP SMEAR, NEXPLANON REMOVAL AND REINSERTION (Vagina) Diagnosis:       Screening for malignant neoplasm of cervix      Encounter for removal and reinsertion of Nexplanon      Mental handicap      (Screening for malignant neoplasm of cervix [Z12.4])      (Encounter for removal and reinsertion of Nexplanon [Z30.46])      (Mental handicap [F79])    Surgeons: Amol Ospina MD Provider: Sandra Cagle CRNA    Anesthesia Type: MAC ASA Status: 3          Anesthesia Type: MAC    Vitals  Vitals Value Taken Time   /72 05/09/23 1345   Temp 97.1 °F (36.2 °C) 05/09/23 1345   Pulse 84 05/09/23 1345   Resp 16 05/09/23 1345   SpO2 96 % 05/09/23 1345           Post Anesthesia Care and Evaluation    Patient location during evaluation: PHASE II  Patient participation: complete - patient participated  Level of consciousness: awake and alert  Pain score: 0  Pain management: satisfactory to patient    Airway patency: patent  Anesthetic complications: No anesthetic complications  PONV Status: none  Cardiovascular status: acceptable and stable  Respiratory status: acceptable  Hydration status: acceptable    Comments: Vitals signs as noted in nursing documentation as per protocol.

## 2023-05-09 NOTE — OP NOTE
BH Asad Hernandez  : 2000  MRN: [id number]  CSN: [id number]  Date: 2023    Operative Report    Pre-op Diagnosis:  Nexplanon in place, desires removal and reinsertion of new device  Screening for malignancy of the cervix  Intellectual disability with nonverbal status   Post-op Diagnosis:  Same   Procedure: Nexplanon removal and reinsertion (Patient supplied device)  Pap smear   Surgeon:  Surgical assistant: SHEREE Weir was responsible for performing the following activities: Retraction and their skilled assistance was necessary for the success of this case.     OR Staff: Circulator: Eve Mejia RN  Scrub Person: Michelle Moore     Anesthesia: Sedation   Estimated Blood Loss: 1 ml   ABx: None   Specimens:  None       Complications: None     Description of Procedure:    After anesthesia was determined to be adequate and an appropriate timeout was performed, the patient was prepped and draped in the usual sterile fashion. The patient had been placed in the dorsal lithotomy position using Cristofer stirrups.  The right arm was positioned appropriately and the Nexplanon device was palpated.  1% lidocaine with epinephrine was injected.  A small vertical incision was made with a scalpel.  The Nexplanon device was removed with a hemostat fully intact.  Additional lidocaine was then injected along the proposed insertion site.  The new Nexplanon device was then prepared and placed per the 's recommendations through the same incision.  Appropriate positioning was verified and the device was easily palpable.  A Steri-Strip was applied.  The right arm was wrapped with gauze.  Attention was then turned to the vagina where a speculum was placed and the cervix was visualized.  A Pap smear was collected.  All instruments were removed from the patient.  The patient tolerated the procedure well. There were no complications. All instrument and sponge counts were correct  at the end of the procedure.  She was taken to postoperative recovery room in stable condition.    Amol Ospina MD  Obstetrics and Gynecology  [facility]

## 2023-05-11 LAB — REF LAB TEST METHOD: NORMAL

## 2023-07-24 DIAGNOSIS — F84.0 AUTISM SPECTRUM DISORDER REQUIRING VERY SUBSTANTIAL SUPPORT (LEVEL 3): ICD-10-CM

## 2023-07-24 DIAGNOSIS — R46.89 AGGRESSION: ICD-10-CM

## 2023-07-24 RX ORDER — RISPERIDONE 2 MG/1
2 TABLET ORAL NIGHTLY
Qty: 90 TABLET | Refills: 0 | Status: SHIPPED | OUTPATIENT
Start: 2023-07-24

## 2023-07-24 NOTE — TELEPHONE ENCOUNTER
Patient needs a follow up appointment    Rx Refill Note  Requested Prescriptions     Pending Prescriptions Disp Refills    risperiDONE (RisperDAL) 2 MG tablet 90 tablet 0     Sig: Take 1 tablet by mouth Every Night.      Last office visit with prescribing clinician: 3/3/2023   Last telemedicine visit with prescribing clinician: Visit date not found   Next office visit with prescribing clinician: Visit date not found                         Would you like a call back once the refill request has been completed: [] Yes [] No    If the office needs to give you a call back, can they leave a voicemail: [] Yes [] No    Judith Leach CMA  07/24/23, 11:20 EDT

## 2023-09-11 DIAGNOSIS — F41.8 OTHER SPECIFIED ANXIETY DISORDERS: ICD-10-CM

## 2023-09-11 DIAGNOSIS — F84.0 AUTISM SPECTRUM DISORDER REQUIRING VERY SUBSTANTIAL SUPPORT (LEVEL 3): ICD-10-CM

## 2023-09-11 DIAGNOSIS — R46.89 AGGRESSION: ICD-10-CM

## 2023-09-12 RX ORDER — CLONIDINE HYDROCHLORIDE 0.3 MG/1
0.3 TABLET ORAL NIGHTLY
Qty: 30 TABLET | Refills: 5 | Status: SHIPPED | OUTPATIENT
Start: 2023-09-12

## 2023-09-26 ENCOUNTER — TRANSCRIBE ORDERS (OUTPATIENT)
Dept: LAB | Facility: HOSPITAL | Age: 23
End: 2023-09-26
Payer: MEDICARE

## 2023-09-26 ENCOUNTER — HOSPITAL ENCOUNTER (OUTPATIENT)
Dept: GENERAL RADIOLOGY | Facility: HOSPITAL | Age: 23
Discharge: HOME | End: 2023-09-26
Payer: MEDICARE

## 2023-09-26 ENCOUNTER — LAB (OUTPATIENT)
Dept: LAB | Facility: HOSPITAL | Age: 23
End: 2023-09-26
Payer: MEDICARE

## 2023-09-26 DIAGNOSIS — N20.0 RENAL STONE: ICD-10-CM

## 2023-09-26 DIAGNOSIS — N20.0 RENAL STONE: Primary | ICD-10-CM

## 2023-09-26 PROCEDURE — 74018 RADEX ABDOMEN 1 VIEW: CPT

## 2023-10-25 DIAGNOSIS — R46.89 AGGRESSION: ICD-10-CM

## 2023-10-25 DIAGNOSIS — F84.0 AUTISM SPECTRUM DISORDER REQUIRING VERY SUBSTANTIAL SUPPORT (LEVEL 3): ICD-10-CM

## 2023-10-25 RX ORDER — RISPERIDONE 2 MG/1
2 TABLET ORAL NIGHTLY
Qty: 90 TABLET | Refills: 0 | Status: SHIPPED | OUTPATIENT
Start: 2023-10-25

## 2023-10-25 NOTE — TELEPHONE ENCOUNTER
Pt has an appt scheduled. Guardian said she was doing really good on her meds. I let her know that she needed to be seen at her next scheduled appt to continue getting meds and she understood.

## 2023-10-27 DIAGNOSIS — R46.89 AGGRESSION: ICD-10-CM

## 2023-10-27 DIAGNOSIS — F84.0 AUTISM SPECTRUM DISORDER REQUIRING VERY SUBSTANTIAL SUPPORT (LEVEL 3): ICD-10-CM

## 2024-01-23 DIAGNOSIS — F84.0 AUTISM SPECTRUM DISORDER REQUIRING VERY SUBSTANTIAL SUPPORT (LEVEL 3): ICD-10-CM

## 2024-01-23 DIAGNOSIS — R46.89 AGGRESSION: ICD-10-CM

## 2024-01-24 RX ORDER — RISPERIDONE 2 MG/1
2 TABLET ORAL NIGHTLY
Qty: 90 TABLET | Refills: 0 | Status: SHIPPED | OUTPATIENT
Start: 2024-01-24

## 2024-02-09 ENCOUNTER — TRANSCRIBE ORDERS (OUTPATIENT)
Dept: LAB | Facility: HOSPITAL | Age: 24
End: 2024-02-09
Payer: MEDICARE

## 2024-02-09 ENCOUNTER — TELEMEDICINE (OUTPATIENT)
Dept: PSYCHIATRY | Facility: CLINIC | Age: 24
End: 2024-02-09
Payer: MEDICARE

## 2024-02-09 ENCOUNTER — LAB (OUTPATIENT)
Dept: LAB | Facility: HOSPITAL | Age: 24
End: 2024-02-09
Payer: MEDICARE

## 2024-02-09 ENCOUNTER — HOSPITAL ENCOUNTER (OUTPATIENT)
Dept: GENERAL RADIOLOGY | Facility: HOSPITAL | Age: 24
Discharge: HOME | End: 2024-02-09
Payer: MEDICARE

## 2024-02-09 DIAGNOSIS — N30.20 BLADDER INFECTION, CHRONIC: ICD-10-CM

## 2024-02-09 DIAGNOSIS — F79 INTELLECTUAL DISABILITY: ICD-10-CM

## 2024-02-09 DIAGNOSIS — N20.0 URIC ACID NEPHROLITHIASIS: Primary | ICD-10-CM

## 2024-02-09 DIAGNOSIS — F84.0 AUTISM SPECTRUM DISORDER REQUIRING VERY SUBSTANTIAL SUPPORT (LEVEL 3): Primary | ICD-10-CM

## 2024-02-09 DIAGNOSIS — N20.0 URIC ACID NEPHROLITHIASIS: ICD-10-CM

## 2024-02-09 DIAGNOSIS — F41.8 OTHER SPECIFIED ANXIETY DISORDERS: ICD-10-CM

## 2024-02-09 DIAGNOSIS — R46.89 AGGRESSION: ICD-10-CM

## 2024-02-09 LAB
ALBUMIN SERPL-MCNC: 4.5 G/DL (ref 3.5–5.2)
ALBUMIN/GLOB SERPL: 1.6 G/DL
ALP SERPL-CCNC: 126 U/L (ref 39–117)
ALT SERPL W P-5'-P-CCNC: 14 U/L (ref 1–33)
ANION GAP SERPL CALCULATED.3IONS-SCNC: 14 MMOL/L (ref 5–15)
AST SERPL-CCNC: 22 U/L (ref 1–32)
BILIRUB SERPL-MCNC: <0.2 MG/DL (ref 0–1.2)
BUN SERPL-MCNC: 10 MG/DL (ref 6–20)
BUN/CREAT SERPL: 11.8 (ref 7–25)
CALCIUM SPEC-SCNC: 9.5 MG/DL (ref 8.6–10.5)
CHLORIDE SERPL-SCNC: 106 MMOL/L (ref 98–107)
CO2 SERPL-SCNC: 19 MMOL/L (ref 22–29)
CREAT SERPL-MCNC: 0.85 MG/DL (ref 0.57–1)
EGFRCR SERPLBLD CKD-EPI 2021: 98.3 ML/MIN/1.73
GLOBULIN UR ELPH-MCNC: 2.8 GM/DL
GLUCOSE SERPL-MCNC: 108 MG/DL (ref 65–99)
POTASSIUM SERPL-SCNC: 5.5 MMOL/L (ref 3.5–5.2)
PROT SERPL-MCNC: 7.3 G/DL (ref 6–8.5)
SODIUM SERPL-SCNC: 139 MMOL/L (ref 136–145)

## 2024-02-09 PROCEDURE — 36415 COLL VENOUS BLD VENIPUNCTURE: CPT

## 2024-02-09 PROCEDURE — 80053 COMPREHEN METABOLIC PANEL: CPT

## 2024-02-09 PROCEDURE — 1160F RVW MEDS BY RX/DR IN RCRD: CPT | Performed by: PSYCHIATRY & NEUROLOGY

## 2024-02-09 PROCEDURE — 1159F MED LIST DOCD IN RCRD: CPT | Performed by: PSYCHIATRY & NEUROLOGY

## 2024-02-09 PROCEDURE — 99214 OFFICE O/P EST MOD 30 MIN: CPT | Performed by: PSYCHIATRY & NEUROLOGY

## 2024-02-09 PROCEDURE — 74018 RADEX ABDOMEN 1 VIEW: CPT

## 2024-02-09 RX ORDER — RISPERIDONE 2 MG/1
2 TABLET ORAL NIGHTLY
Qty: 90 TABLET | Refills: 0 | Status: SHIPPED | OUTPATIENT
Start: 2024-02-09

## 2024-02-09 NOTE — PROGRESS NOTES
Subjective   Eboni Hernandez is a 23 y.o. female who presents today for follow up    This provider is located at [facility], [address] [place] [address]. The Patient is seen remotely at home, using Epic Mychart. Patient is being seen via telehealth and stated they are in a secure environment for this session. The patient’s condition being diagnosed/treated is appropriate for telemedicine. The provider identified himself as well as his credentials.   The patient (and guardian) consent to be seen remotely, and when consent is given they understand that the consent allows for patient identifiable information to be sent to a third party as needed.   They may refuse to be seen remotely at any time. The electronic data is encrypted and password protected, and the patient has been advised of the potential risks to privacy not withstanding such measures      Chief Complaint: Intellectual disability and autism spectrum disorder    History of Present Illness: The patient overall doing well from mental health standpoint.  She is not having any major behavioral outbursts and denies any medication side effects aside from some dry mouth which may be secondary to clonidine and is causing excessive thirst.  Patient relatively stable otherwise.      The following portions of the patient's history were reviewed and updated as appropriate: allergies, current medications, past family history, past medical history, past social history, past surgical history and problem list.      Past Medical History:  Past Medical History:   Diagnosis Date    Anxiety     Autism     Chronic pelvic pain in female     Communication deficit     non-verbal    Constipation     COVID-19 vaccine series completed     moderna    Developmental delay     Difficulty swallowing solids     pills/medications    Encopresis     Enuresis     GERD (gastroesophageal reflux disease)     Global developmental delay     Kidney stones     Multiple developmental  ovarian cysts     Nexplanon in place     left arm    Scoliosis     Seizures     Last seizure was around the 1st of Jan 2022    Urinary incontinence        Social History:  Social History     Socioeconomic History    Marital status: Single   Tobacco Use    Smoking status: Never    Smokeless tobacco: Never   Vaping Use    Vaping Use: Never used   Substance and Sexual Activity    Alcohol use: No    Drug use: No    Sexual activity: Defer       Family History:  Family History   Problem Relation Age of Onset    Drug abuse Mother     Anxiety disorder Mother     Depression Mother     Drug abuse Father     Colon cancer Maternal Grandfather     ADD / ADHD Neg Hx     Alcohol abuse Neg Hx     Bipolar disorder Neg Hx     Dementia Neg Hx     OCD Neg Hx     Paranoid behavior Neg Hx     Schizophrenia Neg Hx     Seizures Neg Hx     Self-Injurious Behavior  Neg Hx     Suicide Attempts Neg Hx        Past Surgical History:  Past Surgical History:   Procedure Laterality Date    DIAGNOSTIC LAPAROSCOPY N/A 09/11/2018    Procedure: DIAGNOSTIC LAPAROSCOPY drainage of right ovarian cyst;  Surgeon: Amol Ospina MD;  Location: [facility] OR;  Service: Obstetrics/Gynecology    ENDOSCOPY N/A 02/07/2022    Procedure: ESOPHAGOGASTRODUODENOSCOPY WITH biopsy and polypectomy;  Surgeon: Salvador Becerra MD;  Location: [facility] ENDOSCOPY;  Service: Gastroenterology;  Laterality: N/A;    ENDOSCOPY N/A 1/20/2023    Procedure: ESOPHAGOGASTRODUODENOSCOPY with biopsy;  Surgeon: Salvador Becerra MD;  Location: [facility] ENDOSCOPY;  Service: Gastroenterology;  Laterality: N/A;    OTHER SURGICAL HISTORY      IUD PLACEMENT IN OR    OTHER SURGICAL HISTORY      nexplanon    PAP SMEAR N/A 5/9/2023    Procedure: PAP SMEAR, NEXPLANON REMOVAL AND REINSERTION;  Surgeon: Amol Ospina MD;  Location: [facility] OR;  Service: Obstetrics/Gynecology;  Laterality: N/A;    URETEROSCOPY LASER LITHOTRIPSY WITH STENT INSERTION  2018    URETEROSCOPY STENT INSERTION   2020       Problem List:  Patient Active Problem List   Diagnosis    Problems with communication    Intellectual disability, profound    Encopresis    Enuresis    Aggression    Insomnia due to other mental disorder    Autism spectrum disorder    Cyst of ovary    Pelvic pain    Nephrolithiasis    Nexplanon in place    Seizure disorder    Gastroesophageal reflux disease without esophagitis    Functional dysphagia    Nausea    Abnormal CT of the abdomen    Belching    Abdominal cramping    Chronic idiopathic constipation    Class 3 severe obesity due to excess calories with serious comorbidity and body mass index (BMI) of 45.0 to 49.9 in adult    Nexplanon in place       Allergy:   No Known Allergies     Current Medications:   Current Outpatient Medications   Medication Sig Dispense Refill    acetaminophen (TYLENOL) 500 MG tablet Take 2 tablets by mouth Every 6 (Six) Hours As Needed. 30 tablet 4    Cenobamate (Xcopri) 100 MG tablet Take 1 tablet (100 mg total) by mouth nightly Swallow tablets whole. Do not crush or chew.. 30 tablet 5    cloNIDine (Catapres) 0.3 MG tablet Take 1 tablet by mouth Every Night. 30 tablet 5    diazePAM (Valium) 10 MG tablet Take 1 tablet by mouth 2 (Two) Times a Day As Needed. 60 tablet 1    diazePAM (Valium) 10 MG tablet Take 1 tablet by mouth Every 12 (Twelve) Hours. 60 tablet 1    diazePAM (Valtoco 10 MG Dose) 10 MG/0.1ML liquid Instill 10 mg into the nostril as directed by provider As Needed For Seizures 2 each 5    Etonogestrel (NEXPLANON SC) Inject  under the skin into the appropriate area as directed.      ibuprofen (ADVIL,MOTRIN) 800 MG tablet Take 1 tablet by mouth Every 6 (Six) Hours with food 30 tablet 6    lamoTRIgine (LaMICtal) 50 MG tablet dispersible disintegrating tablet Place 3 tablets on the tongue 2 (Two) Times a Day. 180 tablet 11    lamoTRIgine (LaMICtal) 50 MG tablet dispersible disintegrating tablet Place 3 tablets on the tongue 2 (Two) Times a Day.. 180 tablet 5     metoclopramide (REGLAN) 5 MG tablet Take 1 tablet by mouth 4 (Four) Times a Day before meals 120 tablet 6    Midazolam (Nayzilam) 5 MG/0.1ML solution Instill 1 spray in nostril if needed for seizure, may repeat in opposite nostril in 10 minutes if needed 4 each 5    omeprazole (priLOSEC) 20 MG capsule Take 1 capsule by mouth Daily. 90 capsule 3    ondansetron (Zofran) 4 MG tablet Take 1 tablet by mouth Every 12 (Twelve) Hours As Needed for Nausea or Vomiting. 30 tablet 1    oxyCODONE-acetaminophen (PERCOCET) 5-325 MG per tablet Take 1 tablet by mouth Every 6 (Six) Hours As Needed. 30 tablet 0    oxyCODONE-acetaminophen (PERCOCET) 5-325 MG per tablet Take 1 tablet by mouth Every 6 (Six) Hours as needed 30 tablet 0    oxyCODONE-acetaminophen (PERCOCET) 7.5-325 MG per tablet Take 1 tablet by mouth Every 4 (Four) Hours As Needed for Pain 30 tablet 0    phenazopyridine (Pyridium) 100 MG tablet Take 1 tablet by mouth 4 (Four) Times a Day As Needed. 30 tablet 6    polyethylene glycol (MIRALAX) 17 g packet Take 17 g by mouth Daily As Needed.      risperiDONE (RisperDAL) 2 MG tablet Take 1 tablet by mouth Every Night. 90 tablet 0    Xcopri 200 MG tablet Take 1 tablet by mouth Every Night. Max Daily Amount: 1 tablet 30 tablet 5    Xcopri 200 MG tablet Take 1 tablet by mouth every night at bedtime. 30 tablet 5    Xcopri 200 MG tablet Take 1 tablet by mouth every night at bedtime. 30 tablet 5     No current facility-administered medications for this visit.       Review of Symptoms:    Review of Systems   Constitutional:  Negative for activity change and unexpected weight gain.   HENT: Negative.     Eyes: Negative.    Respiratory: Negative.     Cardiovascular: Negative.    Gastrointestinal: Negative.  Positive for GERD.   Endocrine: Negative.    Genitourinary: Negative.    Musculoskeletal: Negative.    Allergic/Immunologic: Negative.    Neurological:  Positive for seizures and speech difficulty (nonverbal).   Hematological:  Negative.    Psychiatric/Behavioral:  Negative for agitation, behavioral problems, sleep disturbance and negative for hyperactivity. The patient is not nervous/anxious.          Physical Exam:   not currently breastfeeding.  Video visit    Appearance: AAF of stated age, NAD   Gait, Station, Strength: Video Visit     Mental Status Exam:     MENTAL STATUS EXAM   General Appearance:  Cleanly groomed and dressed  Eye Contact:  Poor eye contact  Attitude:  Cooperative  Motor Activity:  Normal gait, posture  Speech:  Other  Other Comment:  Mute  Mood and affect:  Normal, pleasant  Thought Process:  Other  Other Comment:  Unable to assess  Associations/ Thought Content:  Other  Other Comment:  Unable to assess  Hallucinations:  Other  Other Comment:  Unable to assess  Suicidal Ideations:  Other  Other Comment:  Unable to assess  Homicidal Ideation:  Other  Other Comment:  Unable to assess  Sensorium:  Alert  Orientation:  Other  Other Comment:  Unable to assess  Immediate Recall, Recent, and Remote Memory:  Other  Other Comment:  Unable to assess  Attention Span/ Concentration:  Good  Fund of Knowledge:  Poor  Intellectual Functioning:  Previous IDD dx  Insight:  Limited  Judgement:  Limited  Reliability:  Poor  Impulse Control:  Poor          Lab Results:   No visits with results within 1 Month(s) from this visit.   Latest known visit with results is:   Admission on 05/09/2023, Discharged on 05/09/2023   Component Date Value Ref Range Status    WBC 05/09/2023 7.06  3.40 - 10.80 10*3/mm3 Final    RBC 05/09/2023 4.73  3.77 - 5.28 10*6/mm3 Final    Hemoglobin 05/09/2023 13.0  12.0 - 15.9 g/dL Final    Hematocrit 05/09/2023 40.5  34.0 - 46.6 % Final    MCV 05/09/2023 85.6  79.0 - 97.0 fL Final    MCH 05/09/2023 27.5  26.6 - 33.0 pg Final    MCHC 05/09/2023 32.1  31.5 - 35.7 g/dL Final    RDW 05/09/2023 13.3  12.3 - 15.4 % Final    RDW-SD 05/09/2023 41.8  37.0 - 54.0 fl Final    MPV 05/09/2023 11.3  6.0 - 12.0 fL Final     Platelets 2023 264  140 - 450 10*3/mm3 Final    HCG Qualitative 2023 Negative  Negative Final    Reference Lab Report 2023    Final                    Value:[facility]  [address] [place] [address]  Phone: [phone] or [phone]  Fax: [phone]  Mega Marcos M.D., Medical Director    PATIENT NAME                                     LABORATORY NO.  SELINA SANDOVAL                              [id number]  [id number]                                 AGE                    SEX   SSN              CLIENT REF #  [place] [id number]        2000      F     [id number]      [id number]    [address]                        REQUESTING M.D.           ATTENDING M.D.         COPY TO.  [address]                                [place] [address]                         DATE COLLECTED            DATE RECEIVED          DATE REPORTED  2023                05/10/2023             2023    ThinPrep Pap with Cytyc Imaging    DIAGNOSIS:  Negative for intraepithelial lesion or malignancy    Multiple factors can influence accuracy of Pap                           tests; therefore, screening at  regular intervals is necessary for early cancer detection.      SPECIMEN ADEQUACY:  SATISFACTORY FOR EVALUATION  Transformation zone is present.  SOURCE OF SPECIMEN:       CERVICAL  SLIDES:  1  CLINICAL HISTORY:  Screening for malignant neoplasm of cervix  Encounter for removal and reinsertion of Nexplanon  Mental handicap      CYTOTECHNOLOGIST:             GEMINI DELGADO (ASCP)    CPT CODES:  72112         Assessment & Plan   Diagnoses and all orders for this visit:    1. Autism spectrum disorder requiring very substantial support (level 3) (Primary)  -     risperiDONE (RisperDAL) 2 MG tablet; Take 1 tablet by mouth Every Night.  Dispense: 90 tablet; Refill: 0    2. Aggression  -     risperiDONE (RisperDAL) 2 MG  tablet; Take 1 tablet by mouth Every Night.  Dispense: 90 tablet; Refill: 0    3. Intellectual disability, profound    4. Other specified anxiety disorders      -Patient stable from mental health standpoint but is having some dry mouth which may be secondary to clonidine  -Reviewed previous documentation  -Reviewed most recent available labs  -Sent for recent laboratory studies   -DOUGLAS reviewed and appropriate. Patient counseled on use of controlled substances.   -Continue Risperdal 2 mg nightly for autism spectrum disorder, behavioral disturbance, and anxiety  -Continue diazepam 10 mg p.o. 2 times daily as needed for anxiety.  Patient getting this from neurology for seizure control   -Discontinue clonidine 0.3 mg p.o. nightly for insomnia, behaviors, and anxiety  -Continue Gabapentin 800 mg 3 times daily for mood and pain  -Approximate appointment time 11:15 AM to 11:30 AM via video visit      Visit Diagnoses:    ICD-10-CM ICD-9-CM   1. Autism spectrum disorder requiring very substantial support (level 3)  F84.0 299.00   2. Aggression  R46.89 V40.39   3. Intellectual disability, profound  F79 319   4. Other specified anxiety disorders  F41.8 300.09         TREATMENT PLAN/GOALS: Continue supportive psychotherapy efforts and medications as indicated. Treatment and medication options discussed during today's visit. Patient acknowledged and verbally consented to continue with current treatment plan and was educated on the importance of compliance with treatment and follow-up appointments.    MEDICATION ISSUES:    Discussed medication options and treatment plan of prescribed medication as well as the risks, benefits, and side effects including potential falls, possible impaired driving and metabolic adversities among others. Patient is agreeable to call the office with any worsening of symptoms or onset of side effects. Patient is agreeable to call 911 or go to the nearest ER should he/she begin having SI/HI.     MEDS  ORDERED DURING VISIT:  New Medications Ordered This Visit   Medications    risperiDONE (RisperDAL) 2 MG tablet     Sig: Take 1 tablet by mouth Every Night.     Dispense:  90 tablet     Refill:  0       Return in about 6 months (around 8/9/2024).             This document has been electronically signed by Simon Kapoor MD  February 9, 2024 11:21 EST

## 2024-03-21 ENCOUNTER — LAB (OUTPATIENT)
Dept: LAB | Facility: HOSPITAL | Age: 24
End: 2024-03-21
Payer: MEDICARE

## 2024-03-21 ENCOUNTER — TRANSCRIBE ORDERS (OUTPATIENT)
Dept: LAB | Facility: HOSPITAL | Age: 24
End: 2024-03-21
Payer: MEDICARE

## 2024-03-21 ENCOUNTER — HOSPITAL ENCOUNTER (OUTPATIENT)
Dept: GENERAL RADIOLOGY | Facility: HOSPITAL | Age: 24
Discharge: HOME | End: 2024-03-21
Payer: MEDICARE

## 2024-03-21 DIAGNOSIS — R31.9 HEMATURIA, UNSPECIFIED TYPE: ICD-10-CM

## 2024-03-21 DIAGNOSIS — R31.9 HEMATURIA, UNSPECIFIED TYPE: Primary | ICD-10-CM

## 2024-03-21 PROCEDURE — 74018 RADEX ABDOMEN 1 VIEW: CPT

## 2024-04-11 RX ORDER — OMEPRAZOLE 20 MG/1
20 CAPSULE, DELAYED RELEASE ORAL DAILY
Qty: 90 CAPSULE | Refills: 0 | Status: SHIPPED | OUTPATIENT
Start: 2024-04-11

## 2024-06-05 ENCOUNTER — TRANSCRIBE ORDERS (OUTPATIENT)
Dept: LAB | Facility: HOSPITAL | Age: 24
End: 2024-06-05
Payer: MEDICARE

## 2024-06-05 ENCOUNTER — LAB (OUTPATIENT)
Dept: LAB | Facility: HOSPITAL | Age: 24
End: 2024-06-05
Payer: MEDICARE

## 2024-06-05 ENCOUNTER — HOSPITAL ENCOUNTER (OUTPATIENT)
Dept: GENERAL RADIOLOGY | Facility: HOSPITAL | Age: 24
Discharge: HOME | End: 2024-06-05
Payer: MEDICARE

## 2024-06-05 DIAGNOSIS — R31.1 BENIGN ESSENTIAL MICROSCOPIC HEMATURIA: Primary | ICD-10-CM

## 2024-06-05 DIAGNOSIS — R31.1 BENIGN ESSENTIAL MICROSCOPIC HEMATURIA: ICD-10-CM

## 2024-06-05 LAB
ALBUMIN SERPL-MCNC: 4.1 G/DL (ref 3.5–5.2)
ALBUMIN/GLOB SERPL: 1.4 G/DL
ALP SERPL-CCNC: 94 U/L (ref 39–117)
ALT SERPL W P-5'-P-CCNC: 18 U/L (ref 1–33)
ANION GAP SERPL CALCULATED.3IONS-SCNC: 11.8 MMOL/L (ref 5–15)
AST SERPL-CCNC: 19 U/L (ref 1–32)
BILIRUB SERPL-MCNC: <0.2 MG/DL (ref 0–1.2)
BUN SERPL-MCNC: 10 MG/DL (ref 6–20)
BUN/CREAT SERPL: 13.5 (ref 7–25)
CALCIUM SPEC-SCNC: 9.1 MG/DL (ref 8.6–10.5)
CHLORIDE SERPL-SCNC: 106 MMOL/L (ref 98–107)
CO2 SERPL-SCNC: 20.2 MMOL/L (ref 22–29)
CREAT SERPL-MCNC: 0.74 MG/DL (ref 0.57–1)
EGFRCR SERPLBLD CKD-EPI 2021: 116 ML/MIN/1.73
GLOBULIN UR ELPH-MCNC: 2.9 GM/DL
GLUCOSE SERPL-MCNC: 105 MG/DL (ref 65–99)
POTASSIUM SERPL-SCNC: 4 MMOL/L (ref 3.5–5.2)
PROT SERPL-MCNC: 7 G/DL (ref 6–8.5)
SODIUM SERPL-SCNC: 138 MMOL/L (ref 136–145)

## 2024-06-05 PROCEDURE — 36415 COLL VENOUS BLD VENIPUNCTURE: CPT

## 2024-06-05 PROCEDURE — 80053 COMPREHEN METABOLIC PANEL: CPT

## 2024-06-05 PROCEDURE — 74018 RADEX ABDOMEN 1 VIEW: CPT

## 2024-07-12 ENCOUNTER — TELEMEDICINE (OUTPATIENT)
Dept: PSYCHIATRY | Facility: CLINIC | Age: 24
End: 2024-07-12
Payer: MEDICARE

## 2024-07-12 ENCOUNTER — HOSPITAL ENCOUNTER (OUTPATIENT)
Dept: GENERAL RADIOLOGY | Facility: HOSPITAL | Age: 24
Discharge: HOME | End: 2024-07-12
Payer: MEDICARE

## 2024-07-12 ENCOUNTER — TRANSCRIBE ORDERS (OUTPATIENT)
Dept: LAB | Facility: HOSPITAL | Age: 24
End: 2024-07-12
Payer: MEDICARE

## 2024-07-12 DIAGNOSIS — F79 INTELLECTUAL DISABILITY: ICD-10-CM

## 2024-07-12 DIAGNOSIS — N20.0 KIDNEY STONE: Primary | ICD-10-CM

## 2024-07-12 DIAGNOSIS — N20.0 KIDNEY STONE: ICD-10-CM

## 2024-07-12 DIAGNOSIS — F84.0 AUTISM SPECTRUM DISORDER REQUIRING VERY SUBSTANTIAL SUPPORT (LEVEL 3): Primary | ICD-10-CM

## 2024-07-12 DIAGNOSIS — F41.8 OTHER SPECIFIED ANXIETY DISORDERS: ICD-10-CM

## 2024-07-12 DIAGNOSIS — R46.89 AGGRESSION: ICD-10-CM

## 2024-07-12 PROCEDURE — 1159F MED LIST DOCD IN RCRD: CPT | Performed by: PSYCHIATRY & NEUROLOGY

## 2024-07-12 PROCEDURE — 99214 OFFICE O/P EST MOD 30 MIN: CPT | Performed by: PSYCHIATRY & NEUROLOGY

## 2024-07-12 PROCEDURE — 1160F RVW MEDS BY RX/DR IN RCRD: CPT | Performed by: PSYCHIATRY & NEUROLOGY

## 2024-07-12 PROCEDURE — 74018 RADEX ABDOMEN 1 VIEW: CPT

## 2024-07-12 NOTE — PROGRESS NOTES
Subjective   Eboni Hernandez is a 24 y.o. female who presents today for follow up    This provider is located at [facility], [address] [place] [address]. The Patient is seen remotely at [facility], using Epic Mychart. Patient is being seen via telehealth and stated they are in a secure environment for this session. The patient’s condition being diagnosed/treated is appropriate for telemedicine. The provider identified himself as well as his credentials.   The patient (and guardian) consent to be seen remotely, and when consent is given they understand that the consent allows for patient identifiable information to be sent to a third party as needed.   They may refuse to be seen remotely at any time. The electronic data is encrypted and password protected, and the patient has been advised of the potential risks to privacy not withstanding such measures      Chief Complaint: Intellectual disability and autism spectrum disorder    History of Present Illness: Patient presented today for follow-up with her guardian.  Since last visit, she has not had her Risperdal refilled due to some miscommunication in her pharmacy not sending refill request but she has done fairly well.  She did have some increased anxiety and started having some increased seizure frequency so she was placed back on Valium which helped with anxiety and her Lamictal was increased for seizures.  She is having continued small seizures so she is somewhat out of it or confused at times postictally but it does seem to be improving.  As she is not having any worsening mood or behavioral issues without Risperdal or clonidine, we will monitor closely and leave medications off for now to reduce polypharmacy.  History is obtained from the patient's guardian and she is largely nonverbal though she does look comfortable during the evaluation and is not in any acute distress.    The following portions of the patient's history were  reviewed and updated as appropriate: allergies, current medications, past family history, past medical history, past social history, past surgical history and problem list.      Past Medical History:  Past Medical History:   Diagnosis Date    Anxiety     Autism     Chronic pelvic pain in female     Communication deficit     non-verbal    Constipation     COVID-19 vaccine series completed     moderna    Developmental delay     Difficulty swallowing solids     pills/medications    Encopresis     Enuresis     GERD (gastroesophageal reflux disease)     Global developmental delay     Kidney stones     Multiple developmental ovarian cysts     Nexplanon in place     left arm    Scoliosis     Seizures     Last seizure was around the 1st of Jan 2022    Urinary incontinence        Social History:  Social History     Socioeconomic History    Marital status: Single   Tobacco Use    Smoking status: Never    Smokeless tobacco: Never   Vaping Use    Vaping status: Never Used   Substance and Sexual Activity    Alcohol use: No    Drug use: No    Sexual activity: Defer       Family History:  Family History   Problem Relation Age of Onset    Drug abuse Mother     Anxiety disorder Mother     Depression Mother     Drug abuse Father     Colon cancer Maternal Grandfather     ADD / ADHD Neg Hx     Alcohol abuse Neg Hx     Bipolar disorder Neg Hx     Dementia Neg Hx     OCD Neg Hx     Paranoid behavior Neg Hx     Schizophrenia Neg Hx     Seizures Neg Hx     Self-Injurious Behavior  Neg Hx     Suicide Attempts Neg Hx        Past Surgical History:  Past Surgical History:   Procedure Laterality Date    DIAGNOSTIC LAPAROSCOPY N/A 09/11/2018    Procedure: DIAGNOSTIC LAPAROSCOPY drainage of right ovarian cyst;  Surgeon: Amol Ospina MD;  Location: [facility];  Service: Obstetrics/Gynecology    ENDOSCOPY N/A 02/07/2022    Procedure: ESOPHAGOGASTRODUODENOSCOPY WITH biopsy and polypectomy;  Surgeon: Salvador Becerra MD;  Location:   [facility] ENDOSCOPY;  Service: Gastroenterology;  Laterality: N/A;    ENDOSCOPY N/A 1/20/2023    Procedure: ESOPHAGOGASTRODUODENOSCOPY with biopsy;  Surgeon: Salvador Becerra MD;  Location: [facility] ENDOSCOPY;  Service: Gastroenterology;  Laterality: N/A;    OTHER SURGICAL HISTORY      IUD PLACEMENT IN OR    OTHER SURGICAL HISTORY      nexplanon    PAP SMEAR N/A 5/9/2023    Procedure: PAP SMEAR, NEXPLANON REMOVAL AND REINSERTION;  Surgeon: Amol Ospina MD;  Location: [facility] OR;  Service: Obstetrics/Gynecology;  Laterality: N/A;    URETEROSCOPY LASER LITHOTRIPSY WITH STENT INSERTION  2018    URETEROSCOPY STENT INSERTION  2020       Problem List:  Patient Active Problem List   Diagnosis    Problems with communication    Intellectual disability, profound    Encopresis    Enuresis    Aggression    Insomnia due to other mental disorder    Autism spectrum disorder    Cyst of ovary    Pelvic pain    Nephrolithiasis    Nexplanon in place    Seizure disorder    Gastroesophageal reflux disease without esophagitis    Functional dysphagia    Nausea    Abnormal CT of the abdomen    Belching    Abdominal cramping    Chronic idiopathic constipation    Class 3 severe obesity due to excess calories with serious comorbidity and body mass index (BMI) of 45.0 to 49.9 in adult    Nexplanon in place       Allergy:   No Known Allergies     Current Medications:   Current Outpatient Medications   Medication Sig Dispense Refill    acetaminophen (TYLENOL) 500 MG tablet Take 2 tablets by mouth Every 6 (Six) Hours As Needed. 30 tablet 4    acetaminophen (TYLENOL) 500 MG tablet TAKE 2 TABLETS EVERY 6 HOURS AS NEEDED. 30 tablet 6    acetaminophen (TYLENOL) 500 MG tablet Take 2 tablets by mouth Every 6 (Six) Hours as needed 30 tablet 6    acetaminophen (TYLENOL) 500 MG tablet TAKE 2 TABLETS BY MOUTH EVERY 6 HOURS AS NEEDED 30 tablet 6    Cenobamate (Xcopri) 150 MG tablet Take 1 tablet by mouth every night at bedtime. Maximum Daily Amount 150  mg 30 tablet 5    diazePAM (Valium) 10 MG tablet Take 1 tablet by mouth 2 (Two) Times a Day As Needed. 60 tablet 1    diazePAM (Valium) 10 MG tablet Take 1 tablet by mouth Every 12 (Twelve) Hours. 60 tablet 1    diazePAM (Valium) 10 MG tablet Take 1 tablet by mouth Daily As Needed for Anxiety. 30 tablet 0    diazePAM (Valtoco 10 MG Dose) 10 MG/0.1ML liquid Instill 10 mg into the nostril as directed by provider As Needed For Seizures 2 each 5    diazePAM (Valtoco 10 MG Dose) 10 MG/0.1ML liquid Administer 10 mg into the nostril(s) as directed by provider As Needed. 2 each 5    Etonogestrel (NEXPLANON SC) Inject  under the skin into the appropriate area as directed.      ibuprofen (ADVIL,MOTRIN) 800 MG tablet Take 1 tablet by mouth Every 6 (Six) Hours with food 30 tablet 6    lamoTRIgine (LaMICtal) 50 MG tablet dispersible disintegrating tablet Place 3 tablets on the tongue 2 (Two) Times a Day. 180 tablet 11    lamoTRIgine (LaMICtal) 50 MG tablet dispersible disintegrating tablet Place 3 tablets on the tongue 2 (Two) Times a Day.. 180 tablet 5    metoclopramide (REGLAN) 5 MG tablet Take 1 tablet by mouth 4 (Four) Times a Day before meals 120 tablet 6    Midazolam (Nayzilam) 5 MG/0.1ML solution Instill 1 spray in nostril if needed for seizure, may repeat in opposite nostril in 10 minutes if needed 4 each 5    omeprazole (priLOSEC) 20 MG capsule Take 1 capsule by mouth Daily. 90 capsule 0    ondansetron (Zofran) 4 MG tablet Take 1 tablet by mouth Every 12 (Twelve) Hours As Needed for Nausea or Vomiting. 30 tablet 1    oxyCODONE-acetaminophen (PERCOCET) 5-325 MG per tablet Take 1 tablet by mouth Every 6 (Six) Hours As Needed. 30 tablet 0    oxyCODONE-acetaminophen (PERCOCET) 5-325 MG per tablet Take 1 tablet by mouth Every 6 (Six) Hours as needed 30 tablet 0    oxyCODONE-acetaminophen (PERCOCET) 5-325 MG per tablet TAKE 1 TABLET BY MOUTH EVERY 6 HOURS AS NEEDED FOR PAIN 30 tablet 0    oxyCODONE-acetaminophen (PERCOCET)  5-325 MG per tablet Take 1 tablet by mouth Every 6 (Six) Hours As Needed. 30 tablet 0    oxyCODONE-acetaminophen (PERCOCET) 7.5-325 MG per tablet Take 1 tablet by mouth Every 4 (Four) Hours As Needed for Pain 30 tablet 0    phenazopyridine (Pyridium) 100 MG tablet Take 1 tablet by mouth 4 (Four) Times a Day As Needed. 30 tablet 6    phenazopyridine (Pyridium) 100 MG tablet Take 1 tablet by mouth 4 (Four) Times a Day for dysuria 30 tablet 6    phenazopyridine (Pyridium) 100 MG tablet Take 1 tablet by mouth 4 (Four) Times a Day As Needed For Dysuria 30 tablet 6    phenazopyridine (Pyridium) 100 MG tablet Take one tablet by mouth four times a day as needed for painful urination. 30 tablet 6    polyethylene glycol (MIRALAX) 17 g packet Take 17 g by mouth Daily As Needed.      risperiDONE (RisperDAL) 2 MG tablet Take 1 tablet by mouth Every Night. 90 tablet 0    Xcopri 200 MG tablet Take 1 tablet by mouth Every Night. Max Daily Amount: 1 tablet 30 tablet 5    Xcopri 200 MG tablet Take 1 tablet by mouth every night at bedtime. 30 tablet 5    Xcopri 200 MG tablet Take 1 tablet by mouth every night at bedtime. 30 tablet 5     No current facility-administered medications for this visit.       Review of Symptoms:    Review of Systems   Constitutional:  Negative for activity change and unexpected weight gain.   HENT: Negative.     Eyes: Negative.    Respiratory: Negative.     Cardiovascular: Negative.    Gastrointestinal: Negative.  Positive for GERD.   Endocrine: Negative.    Genitourinary: Negative.    Musculoskeletal: Negative.    Allergic/Immunologic: Negative.    Neurological:  Positive for seizures and speech difficulty (nonverbal).   Hematological: Negative.    Psychiatric/Behavioral:  Negative for agitation, behavioral problems, sleep disturbance and negative for hyperactivity. The patient is not nervous/anxious.          Physical Exam:   not currently breastfeeding.  Video visit    Appearance: AAF of stated age, NAD    Gait, Station, Strength: Video Visit     Mental Status Exam:     MENTAL STATUS EXAM   General Appearance:  Cleanly groomed and dressed  Eye Contact:  Poor eye contact  Attitude:  Cooperative  Motor Activity:  Fidgety  Speech:  Other  Other Comment:  Mute  Mood and affect:  Normal, pleasant  Thought Process:  Other  Other Comment:  Unable to assess  Associations/ Thought Content:  Other  Other Comment:  Unable to assess  Hallucinations:  Other  Other Comment:  Unable to assess  Suicidal Ideations:  Other  Other Comment:  Unable to assess  Homicidal Ideation:  Other  Other Comment:  Unable to assess  Sensorium:  Alert  Orientation:  Other  Other Comment:  Unable to assess  Immediate Recall, Recent, and Remote Memory:  Other  Other Comment:  Unable to assess  Attention Span/ Concentration:  Good  Fund of Knowledge:  Poor  Intellectual Functioning:  Previous IDD dx  Insight:  Limited  Judgement:  Limited  Reliability:  Poor  Impulse Control:  Poor          Lab Results:   No visits with results within 1 Month(s) from this visit.   Latest known visit with results is:   Lab on 06/05/2024   Component Date Value Ref Range Status    Glucose 06/05/2024 105 (H)  65 - 99 mg/dL Final    BUN 06/05/2024 10  6 - 20 mg/dL Final    Creatinine 06/05/2024 0.74  0.57 - 1.00 mg/dL Final    Sodium 06/05/2024 138  136 - 145 mmol/L Final    Potassium 06/05/2024 4.0  3.5 - 5.2 mmol/L Final    Chloride 06/05/2024 106  98 - 107 mmol/L Final    CO2 06/05/2024 20.2 (L)  22.0 - 29.0 mmol/L Final    Calcium 06/05/2024 9.1  8.6 - 10.5 mg/dL Final    Total Protein 06/05/2024 7.0  6.0 - 8.5 g/dL Final    Albumin 06/05/2024 4.1  3.5 - 5.2 g/dL Final    ALT (SGPT) 06/05/2024 18  1 - 33 U/L Final    AST (SGOT) 06/05/2024 19  1 - 32 U/L Final    Alkaline Phosphatase 06/05/2024 94  39 - 117 U/L Final    Total Bilirubin 06/05/2024 <0.2  0.0 - 1.2 mg/dL Final    Globulin 06/05/2024 2.9  gm/dL Final    A/G Ratio 06/05/2024 1.4  g/dL Final    BUN/Creatinine  Ratio 06/05/2024 13.5  7.0 - 25.0 Final    Anion Gap 06/05/2024 11.8  5.0 - 15.0 mmol/L Final    eGFR 06/05/2024 116.0  >60.0 mL/min/1.73 Final       Assessment & Plan   Diagnoses and all orders for this visit:    1. Autism spectrum disorder requiring very substantial support (level 3) (Primary)    2. Intellectual disability, profound    3. Other specified anxiety disorders    4. Aggression        -Patient has not had her Risperdal in 2 months but is doing well without it and her Valium and Lamictal for seizures may be compensating and acting as a mood stabilizer but she has not had any major behavioral issues or outbursts and anxiety is well controlled.  For the sake of safety and polypharmacy, we will leave medications off since she is doing well without and monitor closely.  -Reviewed previous documentation  -Reviewed most recent available labs  -Sent for recent laboratory studies   -DOUGLAS reviewed and appropriate. Patient counseled on use of controlled substances.   -Discontinue Risperdal  -Continue diazepam 10 mg p.o. 2 times daily as needed for anxiety.  Patient getting this from neurology for seizure control   -Gabapentin was discontinued  -Approximate appointment time 10 AM to 10:20 AM via video visit      Visit Diagnoses:    ICD-10-CM ICD-9-CM   1. Autism spectrum disorder requiring very substantial support (level 3)  F84.0 299.00   2. Intellectual disability, profound  F79 319   3. Other specified anxiety disorders  F41.8 300.09   4. Aggression  R46.89 V40.39           TREATMENT PLAN/GOALS: Continue supportive psychotherapy efforts and medications as indicated. Treatment and medication options discussed during today's visit. Patient acknowledged and verbally consented to continue with current treatment plan and was educated on the importance of compliance with treatment and follow-up appointments.    MEDICATION ISSUES:    Discussed medication options and treatment plan of prescribed medication as well as  the risks, benefits, and side effects including potential falls, possible impaired driving and metabolic adversities among others. Patient is agreeable to call the office with any worsening of symptoms or onset of side effects. Patient is agreeable to call 911 or go to the nearest ER should he/she begin having SI/HI.     MEDS ORDERED DURING VISIT:  No orders of the defined types were placed in this encounter.      Return in about 3 months (around 10/12/2024).             This document has been electronically signed by Simon Kapoor MD  July 12, 2024 10:12 EDT

## 2024-07-18 RX ORDER — OMEPRAZOLE 20 MG/1
20 CAPSULE, DELAYED RELEASE ORAL DAILY
Qty: 90 CAPSULE | Refills: 0 | Status: CANCELLED | OUTPATIENT
Start: 2024-07-18

## 2024-07-18 RX ORDER — OMEPRAZOLE 20 MG/1
20 CAPSULE, DELAYED RELEASE ORAL DAILY
Qty: 90 CAPSULE | Refills: 0 | OUTPATIENT
Start: 2024-07-18

## 2024-08-12 ENCOUNTER — TELEPHONE (OUTPATIENT)
Dept: OBSTETRICS AND GYNECOLOGY | Facility: CLINIC | Age: 24
End: 2024-08-12
Payer: MEDICARE

## 2024-08-12 NOTE — TELEPHONE ENCOUNTER
----- Message from Aye BOYCE sent at 8/12/2024 11:13 AM EDT -----  Patient mother called and stated patient is having heavy bleeding with her Nexplanon. Patient is non verbal and has intellectual disability. Her Nexplanon was placed in the OR 04/07/2023. Patient states she has to be sedated for any examinations. Requesting a call back.

## 2024-08-13 ENCOUNTER — TELEPHONE (OUTPATIENT)
Dept: OBSTETRICS AND GYNECOLOGY | Facility: CLINIC | Age: 24
End: 2024-08-13
Payer: MEDICARE

## 2024-08-13 NOTE — TELEPHONE ENCOUNTER
----- Message from Amol Ospina sent at 8/13/2024  6:34 AM EDT -----  It is probably breakthrough bleeding from prolonged progesterone exposure after several years of Nexplanon.  We could try a patch with some estrogen in it to stabilize the uterine lining if she thinks that would be an option.  Thanks  ----- Message -----  From: Jennifer Main MA  Sent: 8/12/2024  12:43 PM EDT  To: Amol Ospina MD      ----- Message -----  From: Aye Quinones RegSched Rep  Sent: 8/12/2024  11:26 AM EDT  [facility]    Patient mother called and stated patient is having heavy bleeding with her Nexplanon. Patient is non verbal and has intellectual disability. Her Nexplanon was placed in the OR 04/07/2023. Patient states she has to be sedated for any examinations. Requesting a call back.

## 2024-08-14 DIAGNOSIS — Z97.5 BREAKTHROUGH BLEEDING ON NEXPLANON: Primary | ICD-10-CM

## 2024-08-14 DIAGNOSIS — N92.1 BREAKTHROUGH BLEEDING ON NEXPLANON: Primary | ICD-10-CM

## 2024-08-14 RX ORDER — NORELGESTROMIN AND ETHINYL ESTRADIOL 35; 150 UG/MG; UG/MG
1 PATCH TRANSDERMAL WEEKLY
Qty: 4 PATCH | Refills: 0 | Status: SHIPPED | OUTPATIENT
Start: 2024-08-14 | End: 2025-08-14

## 2024-08-15 ENCOUNTER — LAB (OUTPATIENT)
Dept: LAB | Facility: HOSPITAL | Age: 24
End: 2024-08-15
Payer: MEDICARE

## 2024-08-15 ENCOUNTER — TRANSCRIBE ORDERS (OUTPATIENT)
Dept: LAB | Facility: HOSPITAL | Age: 24
End: 2024-08-15
Payer: MEDICARE

## 2024-08-15 ENCOUNTER — HOSPITAL ENCOUNTER (OUTPATIENT)
Dept: GENERAL RADIOLOGY | Facility: HOSPITAL | Age: 24
Discharge: HOME | End: 2024-08-15
Payer: MEDICARE

## 2024-08-15 DIAGNOSIS — N20.0 RENAL STONE: Primary | ICD-10-CM

## 2024-08-15 DIAGNOSIS — R31.9 HEMATURIA, UNSPECIFIED TYPE: ICD-10-CM

## 2024-08-15 DIAGNOSIS — N20.0 RENAL STONE: ICD-10-CM

## 2024-08-15 LAB
ALBUMIN SERPL-MCNC: 4.3 G/DL (ref 3.5–5.2)
ALBUMIN/GLOB SERPL: 1.3 G/DL
ALP SERPL-CCNC: 123 U/L (ref 39–117)
ALT SERPL W P-5'-P-CCNC: 10 U/L (ref 1–33)
ANION GAP SERPL CALCULATED.3IONS-SCNC: 13 MMOL/L (ref 5–15)
AST SERPL-CCNC: 9 U/L (ref 1–32)
BILIRUB SERPL-MCNC: 0.2 MG/DL (ref 0–1.2)
BUN SERPL-MCNC: 12 MG/DL (ref 6–20)
BUN/CREAT SERPL: 12.9 (ref 7–25)
CALCIUM SPEC-SCNC: 9.5 MG/DL (ref 8.6–10.5)
CHLORIDE SERPL-SCNC: 102 MMOL/L (ref 98–107)
CO2 SERPL-SCNC: 23 MMOL/L (ref 22–29)
CREAT SERPL-MCNC: 0.93 MG/DL (ref 0.57–1)
EGFRCR SERPLBLD CKD-EPI 2021: 87.7 ML/MIN/1.73
GLOBULIN UR ELPH-MCNC: 3.3 GM/DL
GLUCOSE SERPL-MCNC: 115 MG/DL (ref 65–99)
POTASSIUM SERPL-SCNC: 3.5 MMOL/L (ref 3.5–5.2)
PROT SERPL-MCNC: 7.6 G/DL (ref 6–8.5)
SODIUM SERPL-SCNC: 138 MMOL/L (ref 136–145)

## 2024-08-15 PROCEDURE — 74018 RADEX ABDOMEN 1 VIEW: CPT

## 2024-08-15 PROCEDURE — 80053 COMPREHEN METABOLIC PANEL: CPT

## 2024-08-15 PROCEDURE — 36415 COLL VENOUS BLD VENIPUNCTURE: CPT

## 2024-08-26 ENCOUNTER — OFFICE VISIT (OUTPATIENT)
Dept: GASTROENTEROLOGY | Facility: CLINIC | Age: 24
End: 2024-08-26
Payer: MEDICARE

## 2024-08-26 VITALS
DIASTOLIC BLOOD PRESSURE: 88 MMHG | HEIGHT: 67 IN | WEIGHT: 290 LBS | HEART RATE: 85 BPM | BODY MASS INDEX: 45.52 KG/M2 | SYSTOLIC BLOOD PRESSURE: 124 MMHG | OXYGEN SATURATION: 98 %

## 2024-08-26 DIAGNOSIS — E66.01 CLASS 3 SEVERE OBESITY DUE TO EXCESS CALORIES WITH SERIOUS COMORBIDITY AND BODY MASS INDEX (BMI) OF 45.0 TO 49.9 IN ADULT: Chronic | ICD-10-CM

## 2024-08-26 DIAGNOSIS — F45.8 FUNCTIONAL DYSPHAGIA: Chronic | ICD-10-CM

## 2024-08-26 DIAGNOSIS — R11.0 NAUSEA: Chronic | ICD-10-CM

## 2024-08-26 DIAGNOSIS — R10.9 ABDOMINAL CRAMPING: Chronic | ICD-10-CM

## 2024-08-26 DIAGNOSIS — R14.2 BELCHING: Chronic | ICD-10-CM

## 2024-08-26 DIAGNOSIS — K59.04 CHRONIC IDIOPATHIC CONSTIPATION: Chronic | ICD-10-CM

## 2024-08-26 DIAGNOSIS — K21.9 GASTROESOPHAGEAL REFLUX DISEASE WITHOUT ESOPHAGITIS: Primary | Chronic | ICD-10-CM

## 2024-08-26 DIAGNOSIS — K92.9 FUNCTIONAL GASTROINTESTINAL DISORDER: Chronic | ICD-10-CM

## 2024-08-26 PROCEDURE — 1160F RVW MEDS BY RX/DR IN RCRD: CPT | Performed by: NURSE PRACTITIONER

## 2024-08-26 PROCEDURE — 99214 OFFICE O/P EST MOD 30 MIN: CPT | Performed by: NURSE PRACTITIONER

## 2024-08-26 PROCEDURE — 1159F MED LIST DOCD IN RCRD: CPT | Performed by: NURSE PRACTITIONER

## 2024-08-26 NOTE — PROGRESS NOTES
Follow Up Note     Date: 2024   Patient Name: Eboni Hernandez  MRN: [id number]  : 2000     Primary Care Provider: Rosanne Brandon MD     Chief Complaint   Patient presents with    Follow-up    Heartburn     History of present illness:   2024  Eboni Hernandez is a 24 y.o. female who is here today for follow up for reflux. She is with her legal guardian today who states she has been doing well. Reflux is reasonably controlled with low dose PPI daily. She does not seem to have any trouble swallowing at this time. Constipation doing well with Miralax a couple of times per week. Denies any GI bleeding.     Interval History:  3/29/2023  Eboni Hernandez is a 22 y.o. female who is here today for follow up after EGD. She did not have any problems after her EGD per aunt, the patient's caregiver. They are working with her on losing weight, she has lost about 7 pounds since her last visit. Constipation is improving, she has some occasional diarrhea. She is taking Miralax if needed for constipation with reasonable control. There is on history of GI bleeding. Overall, her aunt feels she is doing ok.     After her last visit to this office, they stopped giving her the Metoclopramide. She has also stopped having the monthly x-rays. She had labs recently at PCP office that were ok per her aunt.     2022  Eboni Hernandez is a 21 y.o. female who is here today to establish care with Gastroenterology for evaluation of acid reflux and heartburn.  Patient has a history of autism and significant intellectual disability, non verbal and seizure disorder.   Her care taker, Aunt says that she has been choking with food and excessive burping since about 2021. She feels that food is not going down. She was started on prilosec 20 mg po daily and was increased to BID since couple of months that is not improving her symptoms. She has nausea and retching.  It is unclear whether she has any  abdominal pain as patient is nonverbal.     Her seizure under control as per care taker. She has chronic constipation and takes miralax as needed. She takes percocet as needed.   There is no history of anemia. No prior history of EGD or colonoscopy. No family history of colon cancer. History of stomach cancer remote family member. No history of any abdominal surgery. Denies alcohol abuse or cigarette smoking.      Patient is also on chronic opioids Percocet. She has history of kidney stones.   CT abdomen pelvis done in May 2021 was unremarkable.  CBC CMP was unremarkable November 2021    Subjective      Past Medical History:   Diagnosis Date    Anxiety     Autism     Chronic pelvic pain in female     Communication deficit     non-verbal    Constipation     COVID-19 vaccine series completed     moderna    Developmental delay     Difficulty swallowing solids     pills/medications    Encopresis     Enuresis     GERD (gastroesophageal reflux disease)     Global developmental delay     Kidney stones     Multiple developmental ovarian cysts     Nexplanon in place     left arm    Scoliosis     Seizures     Last seizure was around the 1st of Jan 2022    Urinary incontinence      Past Surgical History:   Procedure Laterality Date    DIAGNOSTIC LAPAROSCOPY N/A 09/11/2018    Procedure: DIAGNOSTIC LAPAROSCOPY drainage of right ovarian cyst;  Surgeon: Amol Ospina MD;  Location: [facility] OR;  Service: Obstetrics/Gynecology    ENDOSCOPY N/A 02/07/2022    Procedure: ESOPHAGOGASTRODUODENOSCOPY WITH biopsy and polypectomy;  Surgeon: Salvador Becerra MD;  Location: [facility] ENDOSCOPY;  Service: Gastroenterology;  Laterality: N/A;    ENDOSCOPY N/A 1/20/2023    Procedure: ESOPHAGOGASTRODUODENOSCOPY with biopsy;  Surgeon: Salvador Becerra MD;  Location: [facility] ENDOSCOPY;  Service: Gastroenterology;  Laterality: N/A;    OTHER SURGICAL HISTORY      IUD PLACEMENT IN OR    OTHER SURGICAL HISTORY      nexplanon    PAP SMEAR  N/A 5/9/2023    Procedure: PAP SMEAR, NEXPLANON REMOVAL AND REINSERTION;  Surgeon: Amol Ospina MD;  Location: [facility];  Service: Obstetrics/Gynecology;  Laterality: N/A;    URETEROSCOPY LASER LITHOTRIPSY WITH STENT INSERTION  2018    URETEROSCOPY STENT INSERTION  2020     Family History   Problem Relation Age of Onset    Drug abuse Mother     Anxiety disorder Mother     Depression Mother     Drug abuse Father     Colon cancer Maternal Grandfather     ADD / ADHD Neg Hx     Alcohol abuse Neg Hx     Bipolar disorder Neg Hx     Dementia Neg Hx     OCD Neg Hx     Paranoid behavior Neg Hx     Schizophrenia Neg Hx     Seizures Neg Hx     Self-Injurious Behavior  Neg Hx     Suicide Attempts Neg Hx      Social History     Socioeconomic History    Marital status: Single   Tobacco Use    Smoking status: Never    Smokeless tobacco: Never   Vaping Use    Vaping status: Never Used   Substance and Sexual Activity    Alcohol use: No    Drug use: No    Sexual activity: Defer       Current Outpatient Medications:     acetaminophen (TYLENOL) 500 MG tablet, Take 2 tablets by mouth Every 6 (Six) Hours as needed, Disp: 30 tablet, Rfl: 6    diazePAM (Valium) 10 MG tablet, Take 1 tablet by mouth Daily As Needed for anxiety, Disp: 30 tablet, Rfl: 0    Etonogestrel (NEXPLANON SC), Inject  under the skin into the appropriate area as directed., Disp: , Rfl:     ibuprofen (ADVIL,MOTRIN) 800 MG tablet, Take 1 tablet by mouth Every 6 (Six) Hours with food, Disp: 30 tablet, Rfl: 6    lamoTRIgine (LaMICtal) 50 MG tablet dispersible disintegrating tablet, Place 3 tablets on the tongue 2 (Two) Times a Day.., Disp: 180 tablet, Rfl: 5    Midazolam (Nayzilam) 5 MG/0.1ML solution, Instill 1 spray in nostril if needed for seizure, may repeat in opposite nostril in 10 minutes if needed, Disp: 4 each, Rfl: 5    omeprazole (priLOSEC) 20 MG capsule, Take 1 capsule by mouth Daily., Disp: 90 capsule, Rfl: 3    ondansetron (Zofran) 4 MG tablet, Take 1  tablet by mouth Every 12 (Twelve) Hours As Needed for Nausea or Vomiting., Disp: 30 tablet, Rfl: 1    phenazopyridine (Pyridium) 100 MG tablet, Take 1 tablet by mouth 4 (Four) Times a Day As Needed for painful urination, Disp: 30 tablet, Rfl: 6    polyethylene glycol (MIRALAX) 17 g packet, Take 17 g by mouth Daily As Needed., Disp: , Rfl:     Xcopri 200 MG tablet, Take 2 tablets by mouth Every Night., Disp: 60 tablet, Rfl: 5    diazePAM (Valtoco 10 MG Dose) 10 MG/0.1ML liquid, Administer 10 mg into the nostril(s) as directed by provider As Needed. (Patient not taking: Reported on 8/26/2024), Disp: 2 each, Rfl: 5    norelgestromin-ethinyl estradiol (ORTHO EVRA) 150-35 MCG/24HR, Place 1 patch on the skin as directed by provider 1 (One) Time Per Week. (Patient not taking: Reported on 8/26/2024), Disp: 4 patch, Rfl: 0    No Known Allergies    The following portions of the patient's history were reviewed and updated as appropriate: allergies, current medications, past family history, past medical history, past social history, past surgical history and problem list.  Objective     Physical Exam  Vitals and nursing note reviewed.   Constitutional:       General: She is not in acute distress.     Appearance: Normal appearance. She is well-developed.   HENT:      Head: Normocephalic and atraumatic.      Mouth/Throat:      Mouth: Mucous membranes are not pale, not dry and not cyanotic.   Eyes:      General: Lids are normal.   Neck:      Trachea: Trachea normal.   Cardiovascular:      Rate and Rhythm: Normal rate.   Pulmonary:      Effort: Pulmonary effort is normal. No respiratory distress.      Breath sounds: Normal breath sounds.   Skin:     General: Skin is warm and dry.   Neurological:      Mental Status: She is alert. Mental status is at baseline.      Gait: Gait abnormal (patient in wheelchair today).   Psychiatric:         Mood and Affect: Mood normal.         Speech: She is noncommunicative (nonverbal).          "Behavior: Behavior is cooperative.         Cognition and Memory: Cognition is impaired.       Vitals:    08/26/24 1449   BP: 124/88   Pulse: 85   SpO2: 98%   Weight: 132 kg (290 lb)  Comment: per records   Height: 170.2 cm (67\")  Comment: per records     Body mass index is 45.42 kg/m².     Results Review:   I reviewed the patient's new clinical results.    Lab on 08/15/2024   Component Date Value Ref Range Status    Glucose 08/15/2024 115 (H)  65 - 99 mg/dL Final    BUN 08/15/2024 12  6 - 20 mg/dL Final    Creatinine 08/15/2024 0.93  0.57 - 1.00 mg/dL Final    Sodium 08/15/2024 138  136 - 145 mmol/L Final    Potassium 08/15/2024 3.5  3.5 - 5.2 mmol/L Final    Chloride 08/15/2024 102  98 - 107 mmol/L Final    CO2 08/15/2024 23.0  22.0 - 29.0 mmol/L Final    Calcium 08/15/2024 9.5  8.6 - 10.5 mg/dL Final    Total Protein 08/15/2024 7.6  6.0 - 8.5 g/dL Final    Albumin 08/15/2024 4.3  3.5 - 5.2 g/dL Final    ALT (SGPT) 08/15/2024 10  1 - 33 U/L Final    AST (SGOT) 08/15/2024 9  1 - 32 U/L Final    Alkaline Phosphatase 08/15/2024 123 (H)  39 - 117 U/L Final    Total Bilirubin 08/15/2024 0.2  0.0 - 1.2 mg/dL Final    Globulin 08/15/2024 3.3  gm/dL Final    A/G Ratio 08/15/2024 1.3  g/dL Final    BUN/Creatinine Ratio 08/15/2024 12.9  7.0 - 25.0 Final    Anion Gap 08/15/2024 13.0  5.0 - 15.0 mmol/L Final    eGFR 08/15/2024 87.7  >60.0 mL/min/1.73 Final   Lab on 06/05/2024   Component Date Value Ref Range Status    Glucose 06/05/2024 105 (H)  65 - 99 mg/dL Final    BUN 06/05/2024 10  6 - 20 mg/dL Final    Creatinine 06/05/2024 0.74  0.57 - 1.00 mg/dL Final    Sodium 06/05/2024 138  136 - 145 mmol/L Final    Potassium 06/05/2024 4.0  3.5 - 5.2 mmol/L Final    Chloride 06/05/2024 106  98 - 107 mmol/L Final    CO2 06/05/2024 20.2 (L)  22.0 - 29.0 mmol/L Final    Calcium 06/05/2024 9.1  8.6 - 10.5 mg/dL Final    Total Protein 06/05/2024 7.0  6.0 - 8.5 g/dL Final    Albumin 06/05/2024 4.1  3.5 - 5.2 g/dL Final    ALT (SGPT) " 06/05/2024 18  1 - 33 U/L Final    AST (SGOT) 06/05/2024 19  1 - 32 U/L Final    Alkaline Phosphatase 06/05/2024 94  39 - 117 U/L Final    Total Bilirubin 06/05/2024 <0.2  0.0 - 1.2 mg/dL Final    Globulin 06/05/2024 2.9  gm/dL Final    A/G Ratio 06/05/2024 1.4  g/dL Final    BUN/Creatinine Ratio 06/05/2024 13.5  7.0 - 25.0 Final    Anion Gap 06/05/2024 11.8  5.0 - 15.0 mmol/L Final    eGFR 06/05/2024 116.0  >60.0 mL/min/1.73 Final      CT Abdomen Pelvis Stone Protocol     Result Date: 10/26/2022  No definite acute findings in the abdomen or pelvis to account for the patient's symptoms.  No nephrolithiasis. No urolithiasis. No hydronephrosis.  There is a large masslike debris with mottled appearance in the gastric lumen, nonspecific, can be seen in gastric phytobezoar. Correlate clinically.        XR Abdomen KUB     Result Date: 6/5/2024  Unremarkable exam.      XR Abdomen KUB     Result Date: 7/12/2024  Nonspecific bowel gas pattern.  No definite nephrolithiasis seen.    XR Abdomen KUB     Result Date: 8/15/2024  No radiopaque renal stone disease        EGD 2/7/2022 per Dr. Becerra  - Normal oropharynx.  - Z-line regular, 37 cm from the incisors.  - No gross lesions in esophagus.  - No endoscopic esophageal abnormality to explain patient's dysphagia. Biopsied.  - Two gastric polyps. Resected and retrieved.  - Normal cardia, gastric fundus, gastric body, incisura, antrum and prepyloric region of the stomach except two small FGP. Biopsied.  - Normal duodenal bulb, first portion of the duodenum, second portion of the duodenum and third portion of the duodenum. Biopsied.  - No endoscopic findings that could explain her symptoms  - Patient most likely has Functional symptoms.    - Path: WNL     EGD dated 1/20/2023 per Dr. Becerra  - Normal oropharynx.  - Z-line regular, 35 cm from the incisors.  - No gross lesions in the entire esophagus.  - Normal cardia, gastric fundus, gastric body, antrum and prepyloric region of  the stomach. Biopsied.  - Normal duodenal bulb, first portion of the duodenum, second portion of the duodenum and third portion of the duodenum.  - Bilious gastric fluid.  - Abnormal CT finding likely secondary to food in the stomach (patient had CT abdomen few minutes after her morning breakfast).  ANTRUM AND BODY, BIOPSY:   Gastric antral and fundic type mucosa with mild chronic inactive gastritis   Negative for intestinal metaplasia or dysplasia   No Helicobacter pylori-like organisms seen            Assessment / Plan      1. Gastroesophageal reflux disease without esophagitis  2. Functional dysphagia  3. Belching  4. Nausea  5. Abdominal cramping  6. Functional gastrointestinal disorder  7. Chronic idiopathic constipation  8. Class 3 severe obesity due to excess calories with serious comorbidity and body mass index (BMI) of 45.0 to 49.9 in adult  Symptoms reasonably controlled with low-dose PPI daily and MiraLAX as needed.  No significant problem at this time.  There is no history of GI bleeding according to her aunt who is her legal guardian.  No new problems or concerns at this time.  Labs dated 8/15/2024 with borderline elevated alkaline phosphatase, otherwise unremarkable.  Alk phos was normal in June 2024. No history of anemia.  Ultrasound dated 2/3/2022 unremarkable, including liver.  CTAP 10/26/2022  with large masslike debris in the gastric lumen noted, patient had eaten immediately prior to having her CT scan. Liver unremarkable. Follow-up EGD 1/20/2023 unremarkable; CT finding likely secondary to food in the stomach. Biopsies negative for H. pylori.   Low fiber, low-fat diet with liberal water intake.  May use soluble fiber.  Low FODMAP diet  Antireflux measures.  Continue low-dose PPI daily.  Metamucil or fiber Gummies daily.  MiraLAX or stool softeners daily as needed for constipation.  Metoclopramide/Reglan is not recommended for this patient unless absolutely needed, this medicine has not been  prescribed by this office.  Zofran as needed for nausea.  Consider referral to medical weight management.  Consider checking TSH if not already undertaken.   Colonoscopy in the future if symptoms worsen or has rectal bleeding.     - omeprazole (priLOSEC) 20 MG capsule; Take 1 capsule by mouth Daily.  Dispense: 90 capsule; Refill: 3    Patient Instructions   Antireflux measures: Avoid fried, fatty foods, alcohol, chocolate, coffee, tea,  soft drinks, all carbonated beverages (including sparkling water), peppermint and spearmint, spicy foods, tomatoes and tomato based foods, onions, peppers, and garlic.   Other antireflux measures include weight reduction if overweight, avoiding tight clothing around the abdomen, elevating the head of the bed 6 inches with blocks under the head board, and don't drink or eat before going to bed and avoid lying down immediately after meals.  Omeprazole 20 mg 1 by mouth in the am 30 minutes before breakfast.  Eat relatively soft diet, eat in upright position and chew food well.    Drink water after 2-3 bites and take medications with liberal amounts of water.   After eating and taking medications, remain in upright position for 5-10 minutes.  Zofran 4 mg 1 po every 8 hours as needed for nausea.  Low fiber, low fat diet with liberal water intake. May use soluble fiber.   Metamucil 1 packet/scoop daily or fiber gummies/capsules 2-4 per day.   Low FODMAP diet - avoid all dairy. May use lactose free/dairy free alternatives such as almond milk, rice milk, oat milk, etc.   Miralax 17 grams daily as needed for constipation.   May add stool softeners 2 per day as needed for constipation..   Advised to increase activity as tolerated.  Advised to lose 30-40 pounds in the next 6-12 months.  Follow up: 1 year or sooner if needed      Low-FODMAP Eating Plan    FODMAP stands for fermentable oligosaccharides, disaccharides, monosaccharides, and polyols. These are sugars that are hard for some people to  digest. A low-FODMAP eating plan may help some people who have irritable bowel syndrome (IBS) and certain other bowel (intestinal) diseases to manage their symptoms.  This meal plan can be complicated to follow. Work with a diet and nutrition specialist (dietitian) to make a low-FODMAP eating plan that is right for you. A dietitian can help make sure that you get enough nutrition from this diet.  What are tips for following this plan?  Reading food labels  Check labels for hidden FODMAPs such as:  High-fructose syrup.  Honey.  Agave.  Natural fruit flavors.  Onion or garlic powder.  Choose low-FODMAP foods that contain 3-4 grams of fiber per serving.  Check food labels for serving sizes. Eat only one serving at a time to make sure FODMAP levels stay low.  Shopping  Shop with a list of foods that are recommended on this diet and make a meal plan.  Meal planning  Follow a low-FODMAP eating plan for up to 6 weeks, or as told by your health care provider or dietitian.  To follow the eating plan:  Eliminate high-FODMAP foods from your diet completely. Choose only low-FODMAP foods to eat. You will do this for 2-6 weeks.  Gradually reintroduce high-FODMAP foods into your diet one at a time. Most people should wait a few days before introducing the next new high-FODMAP food into their meal plan. Your dietitian can recommend how quickly you may reintroduce foods.  Keep a daily record of what and how much you eat and drink. Make note of any symptoms that you have after eating.  Review your daily record with a dietitian regularly to identify which foods you can eat and which foods you should avoid.  General tips  Drink enough fluid each day to keep your urine pale yellow.  Avoid processed foods. These often have added sugar and may be high in FODMAPs.  Avoid most dairy products, whole grains, and sweeteners.  Work with a dietitian to make sure you get enough fiber in your diet.  Avoid high FODMAP foods at meals to manage  "symptoms.    Recommended foods  Fruits  Bananas, oranges, tangerines, trevor, limes, blueberries, raspberries, strawberries, grapes, cantaloupe, honeydew melon, kiwi, papaya, passion fruit, and pineapple. Limited amounts of dried cranberries, banana chips, and shredded coconut.  Vegetables  Eggplant, zucchini, cucumber, peppers, green beans, bean sprouts, lettuce, arugula, kale, Swiss chard, spinach, madhu greens, bok gris, summer squash, potato, and tomato. Limited amounts of corn, carrot, and sweet potato. Green parts of scallions.  Grains  Gluten-free grains, such as rice, oats, buckwheat, quinoa, corn, polenta, and millet. Gluten-free pasta, bread, or cereal. Rice noodles. Corn tortillas.  Meats and other proteins  Unseasoned beef, pork, poultry, or fish. Eggs. [place]. Tofu (firm) and tempeh. Limited amounts of nuts and seeds, such as almonds, walnuts, brazil nuts, pecans, peanuts, nut butters, pumpkin seeds, екатерина seeds, and sunflower seeds.  Dairy  Lactose-free milk, yogurt, and kefir. Lactose-free cottage cheese and ice cream. Non-dairy milks, such as almond, coconut, hemp, and rice milk. Non-dairy yogurt. Limited amounts of goat cheese, brie, mozzarella, parmesan, swiss, and other hard cheeses.  Fats and oils  Butter-free spreads. Vegetable oils, such as olive, canola, and sunflower oil.  Seasoning and other foods  Artificial sweeteners with names that do not end in \"ol,\" such as aspartame, saccharine, and stevia. Maple syrup, white table sugar, raw sugar, brown sugar, and molasses. Mayonnaise, soy sauce, and tamari. Fresh basil, coriander, parsley, rosemary, and thyme.  Beverages  Water and mineral water. Sugar-sweetened soft drinks. Small amounts of orange juice or cranberry juice. Black and green tea. Most dry go. Coffee.  The items listed above may not be a complete list of foods and beverages you can eat. Contact a dietitian for more information.    Foods to avoid  Fruits  Fresh, dried, and juiced " forms of apple, pear, watermelon, peach, plum, cherries, apricots, blackberries, boysenberries, figs, nectarines, and chasity. Avocado.  Vegetables  Chicory root, artichoke, asparagus, cabbage, snow peas, [place] sprouts, broccoli, sugar snap peas, mushrooms, celery, and cauliflower. Onions, garlic, leeks, and the white part of scallions.  Grains  Wheat, including kamut, durum, and semolina. Barley and bulgur. Couscous. Wheat-based cereals. Wheat noodles, bread, crackers, and pastries.  Meats and other proteins  Fried or fatty meat. Sausage. Cashews and pistachios. Soybeans, baked beans, black beans, chickpeas, kidney beans, sindi beans, navy beans, lentils, black-eyed peas, and split peas.  Dairy  Milk, yogurt, ice cream, and soft cheese. Cream and sour cream. Milk-based sauces. Custard. Buttermilk. Soy milk.  Seasoning and other foods  Any sugar-free gum or candy. Foods that contain artificial sweeteners such as sorbitol, mannitol, isomalt, or xylitol. Foods that contain honey, high-fructose corn syrup, or agave. Bouillon, vegetable stock, beef stock, and chicken stock. Garlic and onion powder. Condiments made with onion, such as hummus, chutney, pickles, relish, salad dressing, and salsa. Tomato paste.  Beverages  Chicory-based drinks. Coffee substitutes. Chamomile tea. Fennel tea. Sweet or fortified go such as port or kojo. Diet soft drinks made with isomalt, mannitol, maltitol, sorbitol, or xylitol. Apple, pear, and chasity juice. Juices with high-fructose corn syrup.  The items listed above may not be a complete list of foods and beverages you should avoid. Contact a dietitian for more information.    Summary  FODMAP stands for fermentable oligosaccharides, disaccharides, monosaccharides, and polyols. These are sugars that are hard for some people to digest.  A low-FODMAP eating plan is a short-term diet that helps to ease symptoms of certain bowel diseases.  The eating plan usually lasts up to 6 weeks. After  that, high-FODMAP foods are reintroduced gradually and one at a time. This can help you find out which foods may be causing symptoms.  A low-FODMAP eating plan can be complicated. It is best to work with a dietitian who has experience with this type of plan.  This information is not intended to replace advice given to you by your health care provider. Make sure you discuss any questions you have with your health care provider.  Document Revised: 05/06/2021 Document Reviewed: 05/06/2021  [employer] Patient Education © 2023 [employer]    Eula Ness, APRN  8/26/2024    Please note that portions of this note were completed with a voice recognition program.

## 2024-08-26 NOTE — PATIENT INSTRUCTIONS
Antireflux measures: Avoid fried, fatty foods, alcohol, chocolate, coffee, tea,  soft drinks, all carbonated beverages (including sparkling water), peppermint and spearmint, spicy foods, tomatoes and tomato based foods, onions, peppers, and garlic.   Other antireflux measures include weight reduction if overweight, avoiding tight clothing around the abdomen, elevating the head of the bed 6 inches with blocks under the head board, and don't drink or eat before going to bed and avoid lying down immediately after meals.  Omeprazole 20 mg 1 by mouth in the am 30 minutes before breakfast.  Eat relatively soft diet, eat in upright position and chew food well.    Drink water after 2-3 bites and take medications with liberal amounts of water.   After eating and taking medications, remain in upright position for 5-10 minutes.  Zofran 4 mg 1 po every 8 hours as needed for nausea.  Low fiber, low fat diet with liberal water intake. May use soluble fiber.   Metamucil 1 packet/scoop daily or fiber gummies/capsules 2-4 per day.   Low FODMAP diet - avoid all dairy. May use lactose free/dairy free alternatives such as almond milk, rice milk, oat milk, etc.   Miralax 17 grams daily as needed for constipation.   May add stool softeners 2 per day as needed for constipation..   Advised to increase activity as tolerated.  Advised to lose 30-40 pounds in the next 6-12 months.  Follow up: 1 year or sooner if needed      Low-FODMAP Eating Plan    FODMAP stands for fermentable oligosaccharides, disaccharides, monosaccharides, and polyols. These are sugars that are hard for some people to digest. A low-FODMAP eating plan may help some people who have irritable bowel syndrome (IBS) and certain other bowel (intestinal) diseases to manage their symptoms.  This meal plan can be complicated to follow. Work with a diet and nutrition specialist (dietitian) to make a low-FODMAP eating plan that is right for you. A dietitian can help make sure that  you get enough nutrition from this diet.  What are tips for following this plan?  Reading food labels  Check labels for hidden FODMAPs such as:  High-fructose syrup.  Honey.  Agave.  Natural fruit flavors.  Onion or garlic powder.  Choose low-FODMAP foods that contain 3-4 grams of fiber per serving.  Check food labels for serving sizes. Eat only one serving at a time to make sure FODMAP levels stay low.  Shopping  Shop with a list of foods that are recommended on this diet and make a meal plan.  Meal planning  Follow a low-FODMAP eating plan for up to 6 weeks, or as told by your health care provider or dietitian.  To follow the eating plan:  Eliminate high-FODMAP foods from your diet completely. Choose only low-FODMAP foods to eat. You will do this for 2-6 weeks.  Gradually reintroduce high-FODMAP foods into your diet one at a time. Most people should wait a few days before introducing the next new high-FODMAP food into their meal plan. Your dietitian can recommend how quickly you may reintroduce foods.  Keep a daily record of what and how much you eat and drink. Make note of any symptoms that you have after eating.  Review your daily record with a dietitian regularly to identify which foods you can eat and which foods you should avoid.  General tips  Drink enough fluid each day to keep your urine pale yellow.  Avoid processed foods. These often have added sugar and may be high in FODMAPs.  Avoid most dairy products, whole grains, and sweeteners.  Work with a dietitian to make sure you get enough fiber in your diet.  Avoid high FODMAP foods at meals to manage symptoms.    Recommended foods  Fruits  Bananas, oranges, tangerines, trevor, limes, blueberries, raspberries, strawberries, grapes, cantaloupe, honeydew melon, kiwi, papaya, passion fruit, and pineapple. Limited amounts of dried cranberries, banana chips, and shredded coconut.  Vegetables  Eggplant, zucchini, cucumber, peppers, green beans, bean sprouts,  "lettuce, arugula, kale, Swiss chard, spinach, madhu greens, bok gris, summer squash, potato, and tomato. Limited amounts of corn, carrot, and sweet potato. Green parts of scallions.  Grains  Gluten-free grains, such as rice, oats, buckwheat, quinoa, corn, polenta, and millet. Gluten-free pasta, bread, or cereal. Rice noodles. Corn tortillas.  Meats and other proteins  Unseasoned beef, pork, poultry, or fish. Eggs. [place]. Tofu (firm) and tempeh. Limited amounts of nuts and seeds, such as almonds, walnuts, brazil nuts, pecans, peanuts, nut butters, pumpkin seeds, екатерина seeds, and sunflower seeds.  Dairy  Lactose-free milk, yogurt, and kefir. Lactose-free cottage cheese and ice cream. Non-dairy milks, such as almond, coconut, hemp, and rice milk. Non-dairy yogurt. Limited amounts of goat cheese, brie, mozzarella, parmesan, swiss, and other hard cheeses.  Fats and oils  Butter-free spreads. Vegetable oils, such as olive, canola, and sunflower oil.  Seasoning and other foods  Artificial sweeteners with names that do not end in \"ol,\" such as aspartame, saccharine, and stevia. Maple syrup, white table sugar, raw sugar, brown sugar, and molasses. Mayonnaise, soy sauce, and tamari. Fresh basil, coriander, parsley, rosemary, and thyme.  Beverages  Water and mineral water. Sugar-sweetened soft drinks. Small amounts of orange juice or cranberry juice. Black and green tea. Most dry go. Coffee.  The items listed above may not be a complete list of foods and beverages you can eat. Contact a dietitian for more information.    Foods to avoid  Fruits  Fresh, dried, and juiced forms of apple, pear, watermelon, peach, plum, cherries, apricots, blackberries, boysenberries, figs, nectarines, and chasity. Avocado.  Vegetables  Chicory root, artichoke, asparagus, cabbage, snow peas, [place] sprouts, broccoli, sugar snap peas, mushrooms, celery, and cauliflower. Onions, garlic, leeks, and the white part of scallions.  Grains  Wheat, " including kamut, durum, and semolina. Barley and bulgur. Couscous. Wheat-based cereals. Wheat noodles, bread, crackers, and pastries.  Meats and other proteins  Fried or fatty meat. Sausage. Cashews and pistachios. Soybeans, baked beans, black beans, chickpeas, kidney beans, sindi beans, navy beans, lentils, black-eyed peas, and split peas.  Dairy  Milk, yogurt, ice cream, and soft cheese. Cream and sour cream. Milk-based sauces. Custard. Buttermilk. Soy milk.  Seasoning and other foods  Any sugar-free gum or candy. Foods that contain artificial sweeteners such as sorbitol, mannitol, isomalt, or xylitol. Foods that contain honey, high-fructose corn syrup, or agave. Bouillon, vegetable stock, beef stock, and chicken stock. Garlic and onion powder. Condiments made with onion, such as hummus, chutney, pickles, relish, salad dressing, and salsa. Tomato paste.  Beverages  Chicory-based drinks. Coffee substitutes. Chamomile tea. Fennel tea. Sweet or fortified go such as port or kojo. Diet soft drinks made with isomalt, mannitol, maltitol, sorbitol, or xylitol. Apple, pear, and chasity juice. Juices with high-fructose corn syrup.  The items listed above may not be a complete list of foods and beverages you should avoid. Contact a dietitian for more information.    Summary  FODMAP stands for fermentable oligosaccharides, disaccharides, monosaccharides, and polyols. These are sugars that are hard for some people to digest.  A low-FODMAP eating plan is a short-term diet that helps to ease symptoms of certain bowel diseases.  The eating plan usually lasts up to 6 weeks. After that, high-FODMAP foods are reintroduced gradually and one at a time. This can help you find out which foods may be causing symptoms.  A low-FODMAP eating plan can be complicated. It is best to work with a dietitian who has experience with this type of plan.  This information is not intended to replace advice given to you by your health care provider.  Make sure you discuss any questions you have with your health care provider.  Document Revised: 05/06/2021 Document Reviewed: 05/06/2021  [employer] Patient Education © 2023 [employer]

## 2024-09-05 ENCOUNTER — TELEPHONE (OUTPATIENT)
Dept: OBSTETRICS AND GYNECOLOGY | Facility: CLINIC | Age: 24
End: 2024-09-05
Payer: MEDICARE

## 2024-09-05 NOTE — TELEPHONE ENCOUNTER
Patient's mother called and states Eboni is still bleeding, due to nexplanon. RX( to help with the bleeding)  was called in around 3 weeks ago but the insurance will not cover. Requesting a different medication to help with the bleeding.    Thank You

## 2024-10-03 RX ORDER — NORETHINDRONE ACETATE AND ETHINYL ESTRADIOL AND FERROUS FUMARATE 1MG-20(24)
1 KIT ORAL DAILY
Qty: 84 TABLET | Refills: 5 | Status: SHIPPED | OUTPATIENT
Start: 2024-10-03

## 2024-10-03 NOTE — TELEPHONE ENCOUNTER
Patient's mother called, patches aren't covered with insurance. I discussed the pills with her and she advised if we can call in the chewable pill she will be able to take that one.

## 2024-10-11 ENCOUNTER — HOSPITAL ENCOUNTER (OUTPATIENT)
Dept: GENERAL RADIOLOGY | Facility: HOSPITAL | Age: 24
Discharge: HOME | End: 2024-10-11
Payer: MEDICARE

## 2024-10-11 ENCOUNTER — TRANSCRIBE ORDERS (OUTPATIENT)
Dept: GENERAL RADIOLOGY | Facility: HOSPITAL | Age: 24
End: 2024-10-11
Payer: MEDICARE

## 2024-10-11 DIAGNOSIS — R31.1 BENIGN ESSENTIAL MICROSCOPIC HEMATURIA: ICD-10-CM

## 2024-10-11 DIAGNOSIS — R31.1 BENIGN ESSENTIAL MICROSCOPIC HEMATURIA: Primary | ICD-10-CM

## 2024-10-11 PROCEDURE — 74018 RADEX ABDOMEN 1 VIEW: CPT

## 2025-02-14 ENCOUNTER — TRANSCRIBE ORDERS (OUTPATIENT)
Dept: GENERAL RADIOLOGY | Facility: HOSPITAL | Age: 25
End: 2025-02-14
Payer: MEDICARE

## 2025-02-14 ENCOUNTER — HOSPITAL ENCOUNTER (OUTPATIENT)
Dept: GENERAL RADIOLOGY | Facility: HOSPITAL | Age: 25
Discharge: HOME | End: 2025-02-14
Payer: MEDICARE

## 2025-02-14 DIAGNOSIS — R31.1 BENIGN ESSENTIAL MICROSCOPIC HEMATURIA: Primary | ICD-10-CM

## 2025-02-14 DIAGNOSIS — R31.1 BENIGN ESSENTIAL MICROSCOPIC HEMATURIA: ICD-10-CM

## 2025-02-14 PROCEDURE — 74018 RADEX ABDOMEN 1 VIEW: CPT

## 2025-03-18 ENCOUNTER — LAB (OUTPATIENT)
Dept: LAB | Facility: HOSPITAL | Age: 25
End: 2025-03-18
Payer: MEDICARE

## 2025-03-18 ENCOUNTER — TRANSCRIBE ORDERS (OUTPATIENT)
Dept: LAB | Facility: HOSPITAL | Age: 25
End: 2025-03-18
Payer: MEDICARE

## 2025-03-18 DIAGNOSIS — R31.9 HEMATURIA, UNSPECIFIED TYPE: Primary | ICD-10-CM

## 2025-03-18 DIAGNOSIS — N20.0 KIDNEY STONE: ICD-10-CM

## 2025-03-18 DIAGNOSIS — N30.20 BLADDER INFECTION, CHRONIC: ICD-10-CM

## 2025-08-25 ENCOUNTER — OFFICE VISIT (OUTPATIENT)
Dept: GASTROENTEROLOGY | Facility: CLINIC | Age: 25
End: 2025-08-25
Payer: MEDICARE

## 2025-08-25 VITALS — DIASTOLIC BLOOD PRESSURE: 78 MMHG | HEART RATE: 99 BPM | SYSTOLIC BLOOD PRESSURE: 120 MMHG | OXYGEN SATURATION: 99 %

## 2025-08-25 DIAGNOSIS — K59.04 CHRONIC IDIOPATHIC CONSTIPATION: Chronic | ICD-10-CM

## 2025-08-25 DIAGNOSIS — K21.9 GASTROESOPHAGEAL REFLUX DISEASE WITHOUT ESOPHAGITIS: Primary | Chronic | ICD-10-CM

## 2025-08-25 DIAGNOSIS — E66.813 CLASS 3 SEVERE OBESITY DUE TO EXCESS CALORIES WITH SERIOUS COMORBIDITY AND BODY MASS INDEX (BMI) OF 45.0 TO 49.9 IN ADULT: Chronic | ICD-10-CM

## 2025-08-25 PROBLEM — G40.109 FOCAL EPILEPSY: Chronic | Status: ACTIVE | Noted: 2021-12-14

## 2025-08-25 PROBLEM — F84.0 AUTISTIC DISORDER: Status: ACTIVE | Noted: 2021-12-14

## 2025-08-25 PROBLEM — F79 MENTAL DISABILITY: Status: ACTIVE | Noted: 2021-12-14

## 2025-08-25 PROCEDURE — 1160F RVW MEDS BY RX/DR IN RCRD: CPT | Performed by: NURSE PRACTITIONER

## 2025-08-25 PROCEDURE — 99214 OFFICE O/P EST MOD 30 MIN: CPT | Performed by: NURSE PRACTITIONER

## 2025-08-25 PROCEDURE — 1159F MED LIST DOCD IN RCRD: CPT | Performed by: NURSE PRACTITIONER

## 2025-08-25 RX ORDER — OMEPRAZOLE 20 MG/1
20 CAPSULE, DELAYED RELEASE ORAL DAILY
Qty: 90 CAPSULE | Refills: 3 | Status: SHIPPED | OUTPATIENT
Start: 2025-08-25

## (undated) DEVICE — GOWN,PREVENTION PLUS,XLARGE,STERILE: Brand: MEDLINE

## (undated) DEVICE — TOWEL,OR,DSP,ST,BLUE,STD,4/PK,20PK/CS: Brand: MEDLINE

## (undated) DEVICE — GLV SURG SENSICARE ALOE LF PF SZ7.5 GRN

## (undated) DEVICE — ADHS LIQ MASTISOL 2/3ML

## (undated) DEVICE — 3M™ STERI-STRIP™ REINFORCED ADHESIVE SKIN CLOSURES, R1547, 1/2 IN X 4 IN (12 MM X 100 MM), 6 STRIPS/ENVELOPE: Brand: 3M™ STERI-STRIP™

## (undated) DEVICE — Device

## (undated) DEVICE — HNDL SWVL PRECONN W/TBG 6FT 1/2IN

## (undated) DEVICE — ENDOSCOPY PORT CONNECTOR FOR OLYMPUS® SCOPES: Brand: ERBE

## (undated) DEVICE — HYBRID TUBING/CAP SET FOR OLYMPUS® SCOPES: Brand: ERBE

## (undated) DEVICE — ENDOPATH XCEL BLADELESS TROCARS WITH STABILITY SLEEVES: Brand: ENDOPATH XCEL

## (undated) DEVICE — PAD SANI MAXI W/ADHS SNG WRP 11IN

## (undated) DEVICE — RICH MINOR LITHOTOMY: Brand: MEDLINE INDUSTRIES, INC.

## (undated) DEVICE — CUFF SCD HEMOFORCE SEQ CALF STD MD

## (undated) DEVICE — STRIP,CLOSURE,WOUND,MEDI-STRIP,1/2X4: Brand: MEDLINE

## (undated) DEVICE — SUT MNCRYL PLS ANTIB UD 3/0 PS2 27IN

## (undated) DEVICE — VLV SXN AIR/H2O ORCAPOD3 1P/U STRL

## (undated) DEVICE — SYR LL TP 10ML STRL

## (undated) DEVICE — GLV SURG BIOGEL PI ULTRATOUCH G SZ7.5 LF

## (undated) DEVICE — INTENDED FOR TISSUE SEPARATION, AND OTHER PROCEDURES THAT REQUIRE A SHARP SURGICAL BLADE TO PUNCTURE OR CUT.: Brand: BARD-PARKER ® CARBON RIB-BACK BLADES

## (undated) DEVICE — ENDOPATH XCEL UNIVERSAL TROCAR STABLILITY SLEEVES: Brand: ENDOPATH XCEL

## (undated) DEVICE — GLV SURG SENSICARE PI LF PF 7.5 GRN STRL

## (undated) DEVICE — 2, DISPOSABLE SUCTION/IRRIGATOR WITHOUT DISPOSABLE TIP: Brand: STRYKEFLOW

## (undated) DEVICE — BNDG GZ SOF-FORM CONFRM 3X75IN LF STRL

## (undated) DEVICE — NDL HYPO ECLPS SFTY 22G 1 1/2IN

## (undated) DEVICE — SUCTION CANISTER, 1500CC, RIGID: Brand: DEROYAL

## (undated) DEVICE — RICH LAVH: Brand: MEDLINE INDUSTRIES, INC.

## (undated) DEVICE — SUT MNCRYL 4/0 PS2 18 IN

## (undated) DEVICE — FRCP BX RADJAW4 NDL 2.8 240 STD OG

## (undated) DEVICE — CONMED SCOPE SAVER BITE BLOCK, 20X27 MM: Brand: SCOPE SAVER

## (undated) DEVICE — SPNG GZ WOVN 4X4IN 12PLY 10/BX STRL

## (undated) DEVICE — SUT GUT CHRM 2/0 SH 27IN G123H

## (undated) DEVICE — GAUZE,SPONGE,4"X4",16PLY,XRAY,STRL,LF: Brand: MEDLINE

## (undated) DEVICE — COUNT NDL FOAM STRIP W/MAG 60CT

## (undated) DEVICE — GAUZE,SPONGE,FLUFF,6"X6.75",STRL,10/TRAY: Brand: MEDLINE